# Patient Record
Sex: MALE | Race: BLACK OR AFRICAN AMERICAN | NOT HISPANIC OR LATINO | URBAN - METROPOLITAN AREA
[De-identification: names, ages, dates, MRNs, and addresses within clinical notes are randomized per-mention and may not be internally consistent; named-entity substitution may affect disease eponyms.]

---

## 2017-11-16 ENCOUNTER — OUTPATIENT (OUTPATIENT)
Dept: OUTPATIENT SERVICES | Facility: HOSPITAL | Age: 57
LOS: 1 days | Discharge: HOME | End: 2017-11-16

## 2017-11-16 DIAGNOSIS — Z76.1 ENCOUNTER FOR HEALTH SUPERVISION AND CARE OF FOUNDLING: ICD-10-CM

## 2017-11-16 DIAGNOSIS — M81.0 AGE-RELATED OSTEOPOROSIS WITHOUT CURRENT PATHOLOGICAL FRACTURE: ICD-10-CM

## 2017-11-16 DIAGNOSIS — E78.00 PURE HYPERCHOLESTEROLEMIA, UNSPECIFIED: ICD-10-CM

## 2017-11-16 DIAGNOSIS — I10 ESSENTIAL (PRIMARY) HYPERTENSION: ICD-10-CM

## 2019-08-14 ENCOUNTER — APPOINTMENT (OUTPATIENT)
Dept: NEUROSURGERY | Facility: CLINIC | Age: 59
End: 2019-08-14

## 2020-04-26 ENCOUNTER — INPATIENT (INPATIENT)
Facility: HOSPITAL | Age: 60
LOS: 1 days | Discharge: HOME | End: 2020-04-28
Attending: INTERNAL MEDICINE | Admitting: INTERNAL MEDICINE
Payer: MEDICARE

## 2020-04-26 VITALS
WEIGHT: 300.05 LBS | HEART RATE: 102 BPM | OXYGEN SATURATION: 92 % | DIASTOLIC BLOOD PRESSURE: 78 MMHG | HEIGHT: 74 IN | SYSTOLIC BLOOD PRESSURE: 158 MMHG | RESPIRATION RATE: 20 BRPM | TEMPERATURE: 103 F

## 2020-04-26 LAB
ALBUMIN SERPL ELPH-MCNC: 4.2 G/DL — SIGNIFICANT CHANGE UP (ref 3.5–5.2)
ALP SERPL-CCNC: 70 U/L — SIGNIFICANT CHANGE UP (ref 30–115)
ALT FLD-CCNC: 112 U/L — HIGH (ref 0–41)
ANION GAP SERPL CALC-SCNC: 14 MMOL/L — SIGNIFICANT CHANGE UP (ref 7–14)
APTT BLD: 27.4 SEC — SIGNIFICANT CHANGE UP (ref 27–39.2)
AST SERPL-CCNC: 98 U/L — HIGH (ref 0–41)
BASOPHILS # BLD AUTO: 0.01 K/UL — SIGNIFICANT CHANGE UP (ref 0–0.2)
BASOPHILS NFR BLD AUTO: 0.2 % — SIGNIFICANT CHANGE UP (ref 0–1)
BILIRUB DIRECT SERPL-MCNC: <0.2 MG/DL — SIGNIFICANT CHANGE UP (ref 0–0.2)
BILIRUB INDIRECT FLD-MCNC: >0.2 MG/DL — SIGNIFICANT CHANGE UP (ref 0.2–1.2)
BILIRUB SERPL-MCNC: 0.4 MG/DL — SIGNIFICANT CHANGE UP (ref 0.2–1.2)
BUN SERPL-MCNC: 9 MG/DL — LOW (ref 10–20)
CALCIUM SERPL-MCNC: 8.8 MG/DL — SIGNIFICANT CHANGE UP (ref 8.5–10.1)
CHLORIDE SERPL-SCNC: 92 MMOL/L — LOW (ref 98–110)
CO2 SERPL-SCNC: 28 MMOL/L — SIGNIFICANT CHANGE UP (ref 17–32)
CREAT SERPL-MCNC: 1.1 MG/DL — SIGNIFICANT CHANGE UP (ref 0.7–1.5)
D DIMER BLD IA.RAPID-MCNC: 202 NG/ML DDU — SIGNIFICANT CHANGE UP (ref 0–230)
EOSINOPHIL # BLD AUTO: 0 K/UL — SIGNIFICANT CHANGE UP (ref 0–0.7)
EOSINOPHIL NFR BLD AUTO: 0 % — SIGNIFICANT CHANGE UP (ref 0–8)
GLUCOSE BLDC GLUCOMTR-MCNC: 156 MG/DL — HIGH (ref 70–99)
GLUCOSE BLDC GLUCOMTR-MCNC: 161 MG/DL — HIGH (ref 70–99)
GLUCOSE BLDC GLUCOMTR-MCNC: 180 MG/DL — HIGH (ref 70–99)
GLUCOSE BLDC GLUCOMTR-MCNC: 235 MG/DL — HIGH (ref 70–99)
GLUCOSE SERPL-MCNC: 235 MG/DL — HIGH (ref 70–99)
HCT VFR BLD CALC: 37.6 % — LOW (ref 42–52)
HGB BLD-MCNC: 12.5 G/DL — LOW (ref 14–18)
IMM GRANULOCYTES NFR BLD AUTO: 0.5 % — HIGH (ref 0.1–0.3)
INR BLD: 1.17 RATIO — SIGNIFICANT CHANGE UP (ref 0.65–1.3)
LACTATE SERPL-SCNC: 1.7 MMOL/L — SIGNIFICANT CHANGE UP (ref 0.7–2)
LIDOCAIN IGE QN: 56 U/L — SIGNIFICANT CHANGE UP (ref 7–60)
LYMPHOCYTES # BLD AUTO: 1.16 K/UL — LOW (ref 1.2–3.4)
LYMPHOCYTES # BLD AUTO: 19.3 % — LOW (ref 20.5–51.1)
MCHC RBC-ENTMCNC: 29.2 PG — SIGNIFICANT CHANGE UP (ref 27–31)
MCHC RBC-ENTMCNC: 33.2 G/DL — SIGNIFICANT CHANGE UP (ref 32–37)
MCV RBC AUTO: 87.9 FL — SIGNIFICANT CHANGE UP (ref 80–94)
MONOCYTES # BLD AUTO: 0.65 K/UL — HIGH (ref 0.1–0.6)
MONOCYTES NFR BLD AUTO: 10.8 % — HIGH (ref 1.7–9.3)
NEUTROPHILS # BLD AUTO: 4.16 K/UL — SIGNIFICANT CHANGE UP (ref 1.4–6.5)
NEUTROPHILS NFR BLD AUTO: 69.2 % — SIGNIFICANT CHANGE UP (ref 42.2–75.2)
NRBC # BLD: 0 /100 WBCS — SIGNIFICANT CHANGE UP (ref 0–0)
NT-PROBNP SERPL-SCNC: <5 PG/ML — SIGNIFICANT CHANGE UP (ref 0–300)
PLATELET # BLD AUTO: 208 K/UL — SIGNIFICANT CHANGE UP (ref 130–400)
POTASSIUM SERPL-MCNC: 3.6 MMOL/L — SIGNIFICANT CHANGE UP (ref 3.5–5)
POTASSIUM SERPL-SCNC: 3.6 MMOL/L — SIGNIFICANT CHANGE UP (ref 3.5–5)
PROT SERPL-MCNC: 7.3 G/DL — SIGNIFICANT CHANGE UP (ref 6–8)
PROTHROM AB SERPL-ACNC: 13.5 SEC — HIGH (ref 9.95–12.87)
RBC # BLD: 4.28 M/UL — LOW (ref 4.7–6.1)
RBC # FLD: 12.1 % — SIGNIFICANT CHANGE UP (ref 11.5–14.5)
SARS-COV-2 RNA SPEC QL NAA+PROBE: DETECTED
SODIUM SERPL-SCNC: 134 MMOL/L — LOW (ref 135–146)
TROPONIN T SERPL-MCNC: <0.01 NG/ML — SIGNIFICANT CHANGE UP
WBC # BLD: 6.01 K/UL — SIGNIFICANT CHANGE UP (ref 4.8–10.8)
WBC # FLD AUTO: 6.01 K/UL — SIGNIFICANT CHANGE UP (ref 4.8–10.8)

## 2020-04-26 PROCEDURE — 99285 EMERGENCY DEPT VISIT HI MDM: CPT | Mod: 25

## 2020-04-26 PROCEDURE — 36000 PLACE NEEDLE IN VEIN: CPT

## 2020-04-26 PROCEDURE — 99497 ADVNCD CARE PLAN 30 MIN: CPT | Mod: 25

## 2020-04-26 PROCEDURE — 93010 ELECTROCARDIOGRAM REPORT: CPT

## 2020-04-26 PROCEDURE — 71045 X-RAY EXAM CHEST 1 VIEW: CPT | Mod: 26

## 2020-04-26 PROCEDURE — 99223 1ST HOSP IP/OBS HIGH 75: CPT

## 2020-04-26 RX ORDER — HYDROXYCHLOROQUINE SULFATE 200 MG
800 TABLET ORAL EVERY 24 HOURS
Refills: 0 | Status: COMPLETED | OUTPATIENT
Start: 2020-04-26 | End: 2020-04-26

## 2020-04-26 RX ORDER — HYDROXYCHLOROQUINE SULFATE 200 MG
400 TABLET ORAL EVERY 24 HOURS
Refills: 0 | Status: DISCONTINUED | OUTPATIENT
Start: 2020-04-27 | End: 2020-04-28

## 2020-04-26 RX ORDER — ENOXAPARIN SODIUM 100 MG/ML
40 INJECTION SUBCUTANEOUS DAILY
Refills: 0 | Status: DISCONTINUED | OUTPATIENT
Start: 2020-04-26 | End: 2020-04-28

## 2020-04-26 RX ORDER — HYDROXYCHLOROQUINE SULFATE 200 MG
TABLET ORAL
Refills: 0 | Status: DISCONTINUED | OUTPATIENT
Start: 2020-04-26 | End: 2020-04-28

## 2020-04-26 RX ORDER — ACETAMINOPHEN 500 MG
975 TABLET ORAL ONCE
Refills: 0 | Status: COMPLETED | OUTPATIENT
Start: 2020-04-26 | End: 2020-04-26

## 2020-04-26 RX ORDER — ACETAMINOPHEN 500 MG
650 TABLET ORAL EVERY 6 HOURS
Refills: 0 | Status: DISCONTINUED | OUTPATIENT
Start: 2020-04-26 | End: 2020-04-28

## 2020-04-26 RX ORDER — NIFEDIPINE 30 MG
90 TABLET, EXTENDED RELEASE 24 HR ORAL DAILY
Refills: 0 | Status: DISCONTINUED | OUTPATIENT
Start: 2020-04-26 | End: 2020-04-28

## 2020-04-26 RX ORDER — CHLORHEXIDINE GLUCONATE 213 G/1000ML
1 SOLUTION TOPICAL
Refills: 0 | Status: DISCONTINUED | OUTPATIENT
Start: 2020-04-26 | End: 2020-04-28

## 2020-04-26 RX ORDER — POTASSIUM CHLORIDE 20 MEQ
20 PACKET (EA) ORAL ONCE
Refills: 0 | Status: COMPLETED | OUTPATIENT
Start: 2020-04-26 | End: 2020-04-26

## 2020-04-26 RX ADMIN — Medication 20 MILLIEQUIVALENT(S): at 12:42

## 2020-04-26 RX ADMIN — Medication 975 MILLIGRAM(S): at 03:09

## 2020-04-26 RX ADMIN — Medication 650 MILLIGRAM(S): at 10:06

## 2020-04-26 RX ADMIN — Medication 90 MILLIGRAM(S): at 10:05

## 2020-04-26 RX ADMIN — Medication 800 MILLIGRAM(S): at 10:05

## 2020-04-26 RX ADMIN — ENOXAPARIN SODIUM 40 MILLIGRAM(S): 100 INJECTION SUBCUTANEOUS at 10:06

## 2020-04-26 NOTE — ED PROVIDER NOTE - NS ED ROS FT
GEN: (+) fever, (-) chills, (-) malaise  HEENT: (-) HA, (-) sore throat  CV: (-) chest pain, (-) palpitations, (-) edema  PULM: (+) cough, (-) wheezing, (+) SOB, (-) orthopnea  GI: (-) abdominal pain,(-) Nausea, (-) Vomiting, (-) Diarrhea  NEURO: (-) weakness, (-) paresthesias, (-) syncope  : (-) dysuria, (-) frequency, (-) urgency  MS: (-) back pain, (-) joint pain, (-)myalgias, (-) swelling  SKIN: (-) rashes, (-) new lesions  HEME: (-) bleeding, (-) ecchymosis

## 2020-04-26 NOTE — H&P ADULT - NSHPPHYSICALEXAM_GEN_ALL_CORE
GENERAL: Obese M in NAD, speaks in full sentences, no signs of respiratory distress  HEAD: Atraumatic  NECK: Supple  CHEST/LUNG: Clear to auscultation bilaterally; No wheeze or crackles  HEART: S1, S2; RRR; No murmurs, rubs, or gallops  ABDOMEN: BS+; Soft, Non-tender, Non-distended  EXTREMITIES:  2+ Peripheral Pulses, No clubbing, cyanosis, or edema  PSYCH: AAOx3  NEUROLOGY: non-focal  SKIN: No rashes or lesions

## 2020-04-26 NOTE — H&P ADULT - NSHPLABSRESULTS_GEN_ALL_CORE
12.5   6.01  )-----------( 208      ( 26 Apr 2020 02:54 )             37.6       04-26    134<L>  |  92<L>  |  9<L>  ----------------------------<  235<H>  3.6   |  28  |  1.1    Ca    8.8      26 Apr 2020 02:54    TPro  7.3  /  Alb  4.2  /  TBili  0.4  /  DBili  <0.2  /  AST  98<H>  /  ALT  112<H>  /  AlkPhos  70  04-26      PT/INR - ( 26 Apr 2020 02:54 )   PT: 13.50 sec;   INR: 1.17 ratio         PTT - ( 26 Apr 2020 02:54 )  PTT:27.4 sec

## 2020-04-26 NOTE — ED PROVIDER NOTE - ATTENDING CONTRIBUTION TO CARE
Patient is c/o cough, congestion, fever, body aches, sob, generalized weakness, no trauma.   vitals noted  lungs: B/L moderate air entry, bibasal rales  abd: +BS, NT, ND, soft  A/P: Fever/URI   r/o pneumonia  labs, CXR  reevaluation
The patient is a 67y Female complaining of shortness of breath.

## 2020-04-26 NOTE — ED PROVIDER NOTE - CARE PLAN
Principal Discharge DX:	Flu-like symptoms  Secondary Diagnosis:	Shortness of breath  Secondary Diagnosis:	Hypoxia  Secondary Diagnosis:	Opacities of both lungs present on chest x-ray

## 2020-04-26 NOTE — ED PROVIDER NOTE - OBJECTIVE STATEMENT
The pt is a 59y M with PMH HTN, DM is presenting to ED with fever and cough, 8 days, mod, non productive, no associated pain or radiation. associated with worsening sob. pt denies chest pain, lightheadedness, dizziness, syncope, n/v/d, abd pain, back pain, urinary changes.

## 2020-04-26 NOTE — ED PROVIDER NOTE - PHYSICAL EXAMINATION
GEN: Alert & Oriented x 3, No acute distress. Calm, appropriate.  Head and Neck: Normocephalic, atraumatic.   Eyes: PERRL. No conjunctival injection. No scleral icterus.   RESP: Rales noted to bilateral bases. no wheezes, rhonchi. No retractions. Equal air entry.  CARDIO: regular rate and rhythm, no murmurs, rubs or gallops. Normal S1, S2.  Radial pulses 2+ bilaterally. No lower extremity edema.  ABD: Soft, Nondistended. No rebound tenderness/guarding. No pulsatile mass. No tenderness with palpation x 4 quadrants  MS: Moving all extremities.   SKIN: no rashes/lesions, no petechiae, no ecchymosis.  NEURO: CN II-XII grossly intact. Speech and cognition normal.

## 2020-04-26 NOTE — H&P ADULT - HISTORY OF PRESENT ILLNESS
58 y/o  M with a PMH of obesity, HTN and DM who presents to the hospital with complaints of fever, non-productive cough, and SOB which started on April 18th. States that multiple family members in his household are sick, his daughter is a PA in the ICU at [facility]. No recent travel. He denies headache, rhinorrhea, sore throat, chest pain, palpitations, abdominal pain, N/V/D, urinary complaints or lower extremity swelling.     In the ED, /72, . Febrile to 102.8. Sating 92% on RA. Lymph 1.16. D-dimer 202. Mild transaminitis. CXR with bilateral interstitial infiltrates. Swabbed for COVID-19. QTc 426. Admitted to medicine for further management.

## 2020-04-26 NOTE — H&P ADULT - ATTENDING COMMENTS
60 YO M with a PMH of HTN and DM who presents to the hospital with a c/o non-productive cough for the past x 8 days. Associated with fevers and SOB. Denies any runny nose, sore throat, rashes, CP, palpitations, LE swelling, N/V/D, ABD pain, and dysuria. States that multiple family members in his household are sick, his daughter is a PA in the ICU at [facility]. No recent travel. In the ED, Chest X-ray with B/L opacities. Hypoxic, placed on NC. Isolated and COVID19 swab sent.     Physical exam shows obese pt in NAD. Tachycardic (102), febrile (102.8), not hypoxic on 4L NC (92 on RA). A&Ox3. Non-focal neuro exam. Muscle strength/sensation intact. CTA B/L with no W/C/R. RRR, no M/G/R. ABD is soft and non-tender, normoactive BSs. LEs without swelling. No rashes. Labs and radiology as above. QTc 426    Fevers + Cough + SOB likely due to viral respiratory syndrome, currently septic, needs COVID19 rule out. - recent travel. - COVID19 contact. Admit to COVID19 isolation unit. COVID19 swab sent. Send CRP, procal, D-dimer, and LDH. Trend CBC, Ck, and LFTs. Send ferritin and fibrinogen. FU official Chest XR report. IV ABXs (Ceftriaxone/Azithro) and discontinue if procal and blood cultures are negative. IV Steroids. IVFs (LR). Vitamin C/Zinc. Start Guaifenesin. APAP PRN. Anti-tussives PRN. Supplemental O2 PRN. C/w statins, if LFTs are not > 75. No NSAIDs. Prone pt regularly.     Lymphopenia and transaminitis, from above. Management as above.     Diabetes mellitus with hyperglycemia. A1c. FSs. Insulin PRN.     Hx of HTN. Restart home meds. GI and DVT PPX. Inform PCP of pt's admission to hospital. Rest as per above note.    ***NOTE SIGNED BY ATTENDING PARAM DOYLE AT 4/26/20 AT 0610, PRIOR TO RESIDENT NOTE COMPLETION***

## 2020-04-26 NOTE — H&P ADULT - ASSESSMENT
60 y/o  M with a PMH of obesity, HTN and DM who presents to the hospital with complaints of fever, non-productive cough, and SOB which started on April 18th. Admitted to medicine for further management.     # Fevers/Non-productive cough/SOB - Rule out COVID-19  - States that multiple family members in his household are sick, his daughter is a PA in the ICU at [facility].   - Date of first symptom onset: April 18th  - Isolation precautions. Limit amount of healthcare professional contact.   - In the ED, /72, . Febrile to 102.8. Sating 92% on RA.   - Lymph 1.16. D-dimer 202. Mild transaminitis.   - CXR with bilateral interstitial infiltrates.    - f/u COVID19 swab (received by lab).  - f/u procalcitonin, CRP (received).    - Consider ferritin, fibrinogen, CK and LDH, depending on clinical course.    - Start on hydroxychloroquine (800 mg X 1 day, then 400 mg X 5 days). QTc 426.   - Tylenol PRN for fevers and mild pain.   - Tessalon 100mg po q8 PRN for cough.  - Trend CBC and LFTs q 48 hrs. Routine labs not ordered for 4/27  - Prone patient as tolerated    # HTN  - resume home nifedipine     # DM  - hold oral anti-glycemics  - f/u hemoglobin A1C   - fingersticks ACHS  - Start on basal/bolus and SSI insulin if fs > 180     # Obesity  - outpatient follow up    DVT ppx: lovenox  GI ppx: none  Ambulate as tolerated  Dispo: Acute, from home  FULL CODE

## 2020-04-27 LAB
A1C WITH ESTIMATED AVERAGE GLUCOSE RESULT: 10.6 % — HIGH (ref 4–5.6)
CHOLEST SERPL-MCNC: 149 MG/DL — SIGNIFICANT CHANGE UP (ref 100–200)
CRP SERPL-MCNC: 7.37 MG/DL — HIGH (ref 0–0.4)
ESTIMATED AVERAGE GLUCOSE: 258 MG/DL — HIGH (ref 68–114)
FERRITIN SERPL-MCNC: 610 NG/ML — HIGH (ref 30–400)
GLUCOSE BLDC GLUCOMTR-MCNC: 181 MG/DL — HIGH (ref 70–99)
GLUCOSE BLDC GLUCOMTR-MCNC: 234 MG/DL — HIGH (ref 70–99)
GLUCOSE BLDC GLUCOMTR-MCNC: 237 MG/DL — HIGH (ref 70–99)
GLUCOSE BLDC GLUCOMTR-MCNC: 243 MG/DL — HIGH (ref 70–99)
HCV AB S/CO SERPL IA: 0.03 COI — SIGNIFICANT CHANGE UP
HCV AB SERPL-IMP: SIGNIFICANT CHANGE UP
HDLC SERPL-MCNC: 32 MG/DL — LOW
LIPID PNL WITH DIRECT LDL SERPL: 83 MG/DL — SIGNIFICANT CHANGE UP (ref 4–129)
PROLACTIN SERPL-MCNC: 22.2 NG/ML — HIGH (ref 4.1–18.4)
TOTAL CHOLESTEROL/HDL RATIO MEASUREMENT: 4.7 RATIO — SIGNIFICANT CHANGE UP (ref 4–5.5)
TRIGL SERPL-MCNC: 90 MG/DL — SIGNIFICANT CHANGE UP (ref 10–149)

## 2020-04-27 PROCEDURE — 99233 SBSQ HOSP IP/OBS HIGH 50: CPT

## 2020-04-27 RX ORDER — DEXTROSE 50 % IN WATER 50 %
12.5 SYRINGE (ML) INTRAVENOUS ONCE
Refills: 0 | Status: DISCONTINUED | OUTPATIENT
Start: 2020-04-27 | End: 2020-04-28

## 2020-04-27 RX ORDER — GLUCAGON INJECTION, SOLUTION 0.5 MG/.1ML
1 INJECTION, SOLUTION SUBCUTANEOUS ONCE
Refills: 0 | Status: DISCONTINUED | OUTPATIENT
Start: 2020-04-27 | End: 2020-04-28

## 2020-04-27 RX ORDER — SODIUM CHLORIDE 9 MG/ML
1000 INJECTION, SOLUTION INTRAVENOUS
Refills: 0 | Status: DISCONTINUED | OUTPATIENT
Start: 2020-04-27 | End: 2020-04-28

## 2020-04-27 RX ORDER — INSULIN GLARGINE 100 [IU]/ML
10 INJECTION, SOLUTION SUBCUTANEOUS AT BEDTIME
Refills: 0 | Status: DISCONTINUED | OUTPATIENT
Start: 2020-04-27 | End: 2020-04-28

## 2020-04-27 RX ORDER — DEXTROSE 50 % IN WATER 50 %
25 SYRINGE (ML) INTRAVENOUS ONCE
Refills: 0 | Status: DISCONTINUED | OUTPATIENT
Start: 2020-04-27 | End: 2020-04-28

## 2020-04-27 RX ORDER — INSULIN LISPRO 100/ML
VIAL (ML) SUBCUTANEOUS
Refills: 0 | Status: DISCONTINUED | OUTPATIENT
Start: 2020-04-27 | End: 2020-04-28

## 2020-04-27 RX ORDER — DEXTROSE 50 % IN WATER 50 %
15 SYRINGE (ML) INTRAVENOUS ONCE
Refills: 0 | Status: DISCONTINUED | OUTPATIENT
Start: 2020-04-27 | End: 2020-04-28

## 2020-04-27 RX ORDER — INSULIN LISPRO 100/ML
4 VIAL (ML) SUBCUTANEOUS
Refills: 0 | Status: DISCONTINUED | OUTPATIENT
Start: 2020-04-27 | End: 2020-04-28

## 2020-04-27 RX ADMIN — Medication 400 MILLIGRAM(S): at 08:48

## 2020-04-27 RX ADMIN — Medication 2: at 13:04

## 2020-04-27 RX ADMIN — Medication 4 UNIT(S): at 13:05

## 2020-04-27 RX ADMIN — Medication 100 MILLIGRAM(S): at 01:01

## 2020-04-27 RX ADMIN — Medication 650 MILLIGRAM(S): at 04:41

## 2020-04-27 RX ADMIN — Medication 2: at 17:52

## 2020-04-27 RX ADMIN — INSULIN GLARGINE 10 UNIT(S): 100 INJECTION, SOLUTION SUBCUTANEOUS at 21:23

## 2020-04-27 RX ADMIN — Medication 650 MILLIGRAM(S): at 01:01

## 2020-04-27 RX ADMIN — CHLORHEXIDINE GLUCONATE 1 APPLICATION(S): 213 SOLUTION TOPICAL at 04:42

## 2020-04-27 RX ADMIN — Medication 4 UNIT(S): at 17:53

## 2020-04-27 RX ADMIN — Medication 90 MILLIGRAM(S): at 04:42

## 2020-04-27 RX ADMIN — ENOXAPARIN SODIUM 40 MILLIGRAM(S): 100 INJECTION SUBCUTANEOUS at 13:06

## 2020-04-27 NOTE — CHART NOTE - NSCHARTNOTEFT_GEN_A_CORE
reviewed case with dr liang on call for [facility] , will monitor overnight and awaiting full inflammatory markers before transferring

## 2020-04-27 NOTE — PROGRESS NOTE ADULT - SUBJECTIVE AND OBJECTIVE BOX
JADA QUIROZ 59y Male  MRN#: [id number]   CODE STATUS: FULL     SUBJECTIVE  Patient is a 59y old Male who presents with a chief complaint of Fever/non-productive cough/SOB - rule out COVID-19 (26 Apr 2020 06:00)  Currently admitted to medicine with the primary diagnosis of Flu-like symptoms    Today is hospital day 1d. Patient is sitting up in bed, on 3-4L NC. Says he feels better on oxygen. No other complaints.     OBJECTIVE  PAST MEDICAL & SURGICAL HISTORY  Obesity  DM (diabetes mellitus)  HTN (hypertension)    ALLERGIES:  No Known Allergies    MEDICATIONS:  STANDING MEDICATIONS  chlorhexidine 4% Liquid 1 Application(s) Topical <User Schedule>  enoxaparin Injectable 40 milliGRAM(s) SubCutaneous daily  hydroxychloroquine 400 milliGRAM(s) Oral every 24 hours  hydroxychloroquine   Oral   NIFEdipine XL 90 milliGRAM(s) Oral daily    PRN MEDICATIONS  acetaminophen   Tablet .. 650 milliGRAM(s) Oral every 6 hours PRN  acetaminophen   Tablet .. 650 milliGRAM(s) Oral every 6 hours PRN  benzonatate 100 milliGRAM(s) Oral every 8 hours PRN      VITAL SIGNS: Last 24 Hours  T(C): 38.1 (27 Apr 2020 04:35), Max: 38.4 (26 Apr 2020 08:24)  T(F): 100.6 (27 Apr 2020 04:35), Max: 101.1 (26 Apr 2020 08:24)  HR: 86 (27 Apr 2020 04:35) (83 - 103)  BP: 109/55 (27 Apr 2020 04:35) (109/55 - 133/78)  BP(mean): --  RR: 20 (27 Apr 2020 04:35) (18 - 20)  SpO2: 96% (27 Apr 2020 04:35) (91% - 100%)    LABS:                        12.5   6.01  )-----------( 208      ( 26 Apr 2020 02:54 )             37.6     04-26    134<L>  |  92<L>  |  9<L>  ----------------------------<  235<H>  3.6   |  28  |  1.1    Ca    8.8      26 Apr 2020 02:54    TPro  7.3  /  Alb  4.2  /  TBili  0.4  /  DBili  <0.2  /  AST  98<H>  /  ALT  112<H>  /  AlkPhos  70  04-26    PT/INR - ( 26 Apr 2020 02:54 )   PT: 13.50 sec;   INR: 1.17 ratio         PTT - ( 26 Apr 2020 02:54 )  PTT:27.4 sec    CARDIAC MARKERS ( 26 Apr 2020 02:54 )  x     / <0.01 ng/mL / x     / x     / x          RADIOLOGY:  < from: Xray Chest 1 View-PORTABLE IMMEDIATE (04.26.20 @ 04:07) >  Impression:    1. New patchy bilateral parenchymal opacities, suspicious for pneumonia in the appropriate clinical setting.    < end of copied text >      PHYSICAL EXAM:    GENERAL: NAD, obese, AAOx3  HEENT: conjunctiva and sclera clear, No JVD  PULMONARY: Clear to auscultation bilaterally, on 4L NC   CARDIOVASCULAR: Regular rate and rhythm; No murmurs, rubs, or gallops  GASTROINTESTINAL: Soft, Nontender, Nondistended; Bowel sounds present  MUSCULOSKELETAL: No edema  NEUROLOGY: non-focal  SKIN: No rashes     ADMISSION SUMMARY  Patient is a 59y old Male who presents with a chief complaint of Fever/non-productive cough/SOB - rule out COVID-19 (26 Apr 2020 06:00)  Currently admitted to medicine with the primary diagnosis of Flu-like symptoms    ASSESSMENT & PLAN  60 y/o  M with a PMH of obesity, HTN and DM who presents to the hospital with complaints of fever, non-productive cough, and SOB which started on April 18th. Admitted to medicine for further management.     #COVID-19 respiratory illness   - States that multiple family members in his household are sick, his daughter is a PA in the ICU at [facility].   - Date of first symptom onset: April 18th, tested positive on 4/26   - Isolation precautions. Limit amount of healthcare professional contact.   - In the ED, /72, . Febrile to 102.8. Sating 92% on RA.   - Lymph 1.16. D-dimer 202. Mild transaminitis.   - CXR with bilateral interstitial infiltrates.    - f/u COVID19 swab (received by lab).  - f/u procalcitonin, CRP (received).    - Consider ferritin, fibrinogen, CK and LDH, depending on clinical course.    - Start on hydroxychloroquine (800 mg X 1 day, then 400 mg X 5 days). QTc 426.   - Tylenol PRN for fevers and mild pain.   - Tessalon 100mg po q8 PRN for cough.  - Trend CBC and LFTs q 48 hrs. Routine labs not ordered for 4/27  - Prone patient as tolerated    # HTN  - resume home nifedipine     # DM  - hold oral anti-glycemics  - f/u hemoglobin A1C   - fingersticks ACHS  - Start on basal/bolus and SSI insulin if fs > 180     # Obesity  - outpatient follow up    DVT ppx: lovenox  GI ppx: none  Ambulate as tolerated  Dispo: Acute, from home  FULL CODE JADA QUIROZ 59y Male  MRN#: [id number]   CODE STATUS: FULL     SUBJECTIVE  Patient is a 59y old Male who presents with a chief complaint of Fever/non-productive cough/SOB - rule out COVID-19 (26 Apr 2020 06:00)  Currently admitted to medicine with the primary diagnosis of Flu-like symptoms    Today is hospital day 1d. Patient is sitting up in bed, on 3-4L NC. Says he feels better on oxygen. No other complaints.     OBJECTIVE  PAST MEDICAL & SURGICAL HISTORY  Obesity  DM (diabetes mellitus)  HTN (hypertension)    ALLERGIES:  No Known Allergies    MEDICATIONS:  STANDING MEDICATIONS  chlorhexidine 4% Liquid 1 Application(s) Topical <User Schedule>  enoxaparin Injectable 40 milliGRAM(s) SubCutaneous daily  hydroxychloroquine 400 milliGRAM(s) Oral every 24 hours  hydroxychloroquine   Oral   NIFEdipine XL 90 milliGRAM(s) Oral daily    PRN MEDICATIONS  acetaminophen   Tablet .. 650 milliGRAM(s) Oral every 6 hours PRN  acetaminophen   Tablet .. 650 milliGRAM(s) Oral every 6 hours PRN  benzonatate 100 milliGRAM(s) Oral every 8 hours PRN      VITAL SIGNS: Last 24 Hours  T(C): 38.1 (27 Apr 2020 04:35), Max: 38.4 (26 Apr 2020 08:24)  T(F): 100.6 (27 Apr 2020 04:35), Max: 101.1 (26 Apr 2020 08:24)  HR: 86 (27 Apr 2020 04:35) (83 - 103)  BP: 109/55 (27 Apr 2020 04:35) (109/55 - 133/78)  BP(mean): --  RR: 20 (27 Apr 2020 04:35) (18 - 20)  SpO2: 96% (27 Apr 2020 04:35) (91% - 100%)    LABS:                        12.5   6.01  )-----------( 208      ( 26 Apr 2020 02:54 )             37.6     04-26    134<L>  |  92<L>  |  9<L>  ----------------------------<  235<H>  3.6   |  28  |  1.1    Ca    8.8      26 Apr 2020 02:54    TPro  7.3  /  Alb  4.2  /  TBili  0.4  /  DBili  <0.2  /  AST  98<H>  /  ALT  112<H>  /  AlkPhos  70  04-26    PT/INR - ( 26 Apr 2020 02:54 )   PT: 13.50 sec;   INR: 1.17 ratio         PTT - ( 26 Apr 2020 02:54 )  PTT:27.4 sec    CARDIAC MARKERS ( 26 Apr 2020 02:54 )  x     / <0.01 ng/mL / x     / x     / x          RADIOLOGY:  < from: Xray Chest 1 View-PORTABLE IMMEDIATE (04.26.20 @ 04:07) >  Impression:    1. New patchy bilateral parenchymal opacities, suspicious for pneumonia in the appropriate clinical setting.    < end of copied text >      PHYSICAL EXAM:    GENERAL: NAD, obese, AAOx3  HEENT: conjunctiva and sclera clear, No JVD  PULMONARY: Clear to auscultation bilaterally, on 4L NC   CARDIOVASCULAR: Regular rate and rhythm; No murmurs, rubs, or gallops  GASTROINTESTINAL: Soft, Nontender, Nondistended; Bowel sounds present  MUSCULOSKELETAL: No edema  NEUROLOGY: non-focal  SKIN: No rashes     ADMISSION SUMMARY  Patient is a 59y old Male who presents with a chief complaint of Fever/non-productive cough/SOB - rule out COVID-19 (26 Apr 2020 06:00)  Currently admitted to medicine with the primary diagnosis of Flu-like symptoms    ASSESSMENT & PLAN  60 y/o  M with a PMH of obesity, HTN and DM who presents to the hospital with complaints of fever, non-productive cough, and SOB which started on April 18th. Admitted to medicine for further management.     #COVID-19 respiratory illness   - States that multiple family members in his household are sick, his daughter is a PA in the ICU at [facility].   - Date of first symptom onset: April 18th, tested positive on 4/26   - In the ED, /72, . Febrile to 102.8. Sating 92% on RA.   - Lymph 1.16. D-dimer 202. Mild transaminitis.   - CXR with bilateral interstitial infiltrates.    - f/u procalcitonin, CRP (received).    - Consider ferritin, fibrinogen, CK and LDH, depending on clinical course.    - Start on hydroxychloroquine (800 mg X 1 day, then 400 mg X 5 days). QTc 426.   - Tylenol PRN for fevers and mild pain.   - Tessalon 100mg po q8 PRN for cough.   - Trend CBC and LFTs q 48 hrs.   - Prone patient as tolerated    # HTN, controlled   - resume home nifedipine     # DM  - hold oral anti-glycemics  - f/u hemoglobin A1C   - started on insulin sliding scale as FS >180      # Obesity  - outpatient follow up    DVT ppx: lovenox  GI ppx: none  Ambulate as tolerated  Dispo: Acute, from home  FULL CODE JADA QUIROZ 59y Male  MRN#: [id number]   CODE STATUS: FULL     SUBJECTIVE  Patient is a 59y old Male who presents with a chief complaint of Fever/non-productive cough/SOB - rule out COVID-19 (26 Apr 2020 06:00)  Currently admitted to medicine with the primary diagnosis of Flu-like symptoms    Today is hospital day 1d. Patient is sitting up in bed, on 3-4L NC. Says he feels better on oxygen. No other complaints.     OBJECTIVE  PAST MEDICAL & SURGICAL HISTORY  Obesity  DM (diabetes mellitus)  HTN (hypertension)    ALLERGIES:  No Known Allergies    MEDICATIONS:  STANDING MEDICATIONS  chlorhexidine 4% Liquid 1 Application(s) Topical <User Schedule>  enoxaparin Injectable 40 milliGRAM(s) SubCutaneous daily  hydroxychloroquine 400 milliGRAM(s) Oral every 24 hours  hydroxychloroquine   Oral   NIFEdipine XL 90 milliGRAM(s) Oral daily    PRN MEDICATIONS  acetaminophen   Tablet .. 650 milliGRAM(s) Oral every 6 hours PRN  acetaminophen   Tablet .. 650 milliGRAM(s) Oral every 6 hours PRN  benzonatate 100 milliGRAM(s) Oral every 8 hours PRN      VITAL SIGNS: Last 24 Hours  T(C): 38.1 (27 Apr 2020 04:35), Max: 38.4 (26 Apr 2020 08:24)  T(F): 100.6 (27 Apr 2020 04:35), Max: 101.1 (26 Apr 2020 08:24)  HR: 86 (27 Apr 2020 04:35) (83 - 103)  BP: 109/55 (27 Apr 2020 04:35) (109/55 - 133/78)  BP(mean): --  RR: 20 (27 Apr 2020 04:35) (18 - 20)  SpO2: 96% (27 Apr 2020 04:35) (91% - 100%)    LABS:                        12.5   6.01  )-----------( 208      ( 26 Apr 2020 02:54 )             37.6     04-26    134<L>  |  92<L>  |  9<L>  ----------------------------<  235<H>  3.6   |  28  |  1.1    Ca    8.8      26 Apr 2020 02:54    TPro  7.3  /  Alb  4.2  /  TBili  0.4  /  DBili  <0.2  /  AST  98<H>  /  ALT  112<H>  /  AlkPhos  70  04-26    PT/INR - ( 26 Apr 2020 02:54 )   PT: 13.50 sec;   INR: 1.17 ratio         PTT - ( 26 Apr 2020 02:54 )  PTT:27.4 sec    CARDIAC MARKERS ( 26 Apr 2020 02:54 )  x     / <0.01 ng/mL / x     / x     / x          RADIOLOGY:  < from: Xray Chest 1 View-PORTABLE IMMEDIATE (04.26.20 @ 04:07) >  Impression:    1. New patchy bilateral parenchymal opacities, suspicious for pneumonia in the appropriate clinical setting.    < end of copied text >      PHYSICAL EXAM:    GENERAL: NAD, obese, AAOx3  HEENT: conjunctiva and sclera clear, No JVD  PULMONARY: Clear to auscultation bilaterally, on 4L NC   CARDIOVASCULAR: Regular rate and rhythm  GASTROINTESTINAL: Soft, Nontender, Nondistended  MUSCULOSKELETAL: No edema  NEUROLOGY: non-focal  SKIN: No rashes     ADMISSION SUMMARY  Patient is a 59y old Male who presents with a chief complaint of Fever/non-productive cough/SOB - rule out COVID-19 (26 Apr 2020 06:00)  Currently admitted to medicine with the primary diagnosis of Flu-like symptoms    ASSESSMENT & PLAN  60 y/o  M with a PMH of obesity, HTN and DM who presents to the hospital with complaints of fever, non-productive cough, and SOB which started on April 18th. Admitted to medicine for further management.     #COVID-19 respiratory illness   - States that multiple family members in his household are sick, his daughter is a PA in the ICU at [facility].   - Date of first symptom onset: April 18th, tested positive on 4/26   - In the ED, /72, . Febrile to 102.8. Sating 92% on RA.   - Lymph 1.16. D-dimer 202. Mild transaminitis.   - CXR with bilateral interstitial infiltrates.    - will try to titrate patient off supplemental oxygen   - f/u procalcitonin, CRP (received).    - Consider ferritin, fibrinogen, CK and LDH, depending on clinical course.    - Start on hydroxychloroquine (800 mg X 1 day, then 400 mg X 5 days). QTc 426.   - Tylenol PRN for fevers and mild pain.   - Tessalon 100mg po q8 PRN for cough.   - Trend CBC and LFTs q 48 hrs.   - Prone patient as tolerated    # HTN, controlled   - resume home nifedipine     # DM  - hold oral anti-glycemics  - f/u hemoglobin A1C   - started on insulin sliding scale as FS >180      # Obesity  - outpatient follow up    DVT ppx: lovenox  GI ppx: none  Ambulate as tolerated  Dispo: Acute, from home  FULL CODE

## 2020-04-27 NOTE — PROGRESS NOTE ADULT - ASSESSMENT
Patient is a 59y old  Male who presents with a chief complaint of Fever/non-productive cough/SOB - rule out COVID-19 (27 Apr 2020 07:58)    #Sepsis poa  secondary to COVID-19  questionable hypoxia since saturating low 90s on 4L, but no evidence of hypoxia (92 on RA) , titrate oxygen down today   hydroxychloroquine  crp, ferritin, procalcitonin  pending   Sepsis resolved    #Morbid obesity BMI 40 patient needs to see dieitian outpatient for further evaluation and qualifies for evaluation by bariatric surgeron     #DM  POCT Blood Glucose.: 181 mg/dL (27 Apr 2020 08:23)  POCT Blood Glucose.: 235 mg/dL (26 Apr 2020 21:10)  POCT Blood Glucose.: 180 mg/dL (26 Apr 2020 16:45)  POCT Blood Glucose.: 161 mg/dL (26 Apr 2020 12:00)  controlled    #Hyponatremia -> pseudohyponatremia on BMP , correcting for hyperglycemia the sodium is 136-137 dependant on bautista versus Katiana equations respectively     #CKD 2    #Anemia no indication for transfusion     #HTN Vital Signs Last 24 Hrs  BP: 101/68 (27 Apr 2020 08:36) (101/68 - 133/75)  controlled    #FULL CODE    Progress Note Handoff    Pending:  CRP, Procal , Ferritin pending , titration of oxygen    Family discussion: patient verbalized understanding and agreeable to plan of care , all questions answer     Disposition: Home___

## 2020-04-27 NOTE — CHART NOTE - NSCHARTNOTEFT_GEN_A_CORE
I made rounds today with the treatment team including the hospitalist, residents,  nurses and discussed the patient's current medical status and discharge  planning needs, and reviewed the chart.    T(C): 36.7 (04-27-20 @ 12:40), Max: 38.2 (04-27-20 @ 00:00)  HR: 92 (04-27-20 @ 12:40) (83 - 103)  BP: 105/56 (04-27-20 @ 12:40) (101/68 - 133/75)  RR: 18 (04-27-20 @ 12:40) (18 - 20)  SpO2: 91% (04-27-20 @ 12:40) (91% - 100%)          I reached out to the patient's health care proxy/ responsible family member-           [     ]  I reached                                     and discussed the patient's medical condition,                   family concerns, and discharge planning           [     ]  I left a message with family               [   X  ]  I personally participated in rounds with the medical team and my resident and discussed the case. My resident reached                   family member/ HCP     daughter, Rashida, an NP          under my direction and supervision  and we reviewed the conversation.          [     ]  My resident left a message with family under my direction and supervision    The following was discussed:     medical condition reviewed. Patient is on 3-4 liters NC O2 with O2 sat 93%. She has a low grade fever and is getting Plaquenil. LFTs are increased.          [     ] I spent 5-10 minutes on the above discussing medical issues with team members and family and/ or my resident    [  X   ] I spent 11-20 minutes on the above discussing medical issues with team members and family and/ or my resident    [     ] I spent 21-30 minutes on the above discussing medical issues with team members and family and/ or my resident

## 2020-04-27 NOTE — PROGRESS NOTE ADULT - SUBJECTIVE AND OBJECTIVE BOX
JADA QUIROZ  59y  [id number]      Patient is a 59y old  Male who presents with a chief complaint of Fever/non-productive cough/SOB - rule out COVID-19 (27 Apr 2020 07:58)      INTERVAL HPI/OVERNIGHT EVENTS:    no acute events overnight     REVIEW OF SYSTEMS:  CONSTITUTIONAL: No fever, weight loss, or fatigue  EYES: No eye pain, visual disturbances, or discharge  ENMT:  No difficulty hearing, tinnitus, vertigo; No sinus or throat pain  NECK: No pain or stiffness  BREASTS: No pain, masses, or nipple discharge  RESPIRATORY: No cough, wheezing, chills or hemoptysis; No shortness of breath  CARDIOVASCULAR: No chest pain, palpitations, dizziness, or leg swelling  GASTROINTESTINAL: No abdominal or epigastric pain. No nausea, vomiting, or hematemesis; No diarrhea or constipation. No melena or hematochezia.  GENITOURINARY: No dysuria, frequency, hematuria, or incontinence  NEUROLOGICAL: No headaches, memory loss, loss of strength, numbness, or tremors  SKIN: No itching, burning, rashes, or lesions   LYMPH NODES: No enlarged glands  ENDOCRINE: No heat or cold intolerance; No hair loss  MUSCULOSKELETAL: No joint pain or swelling; No muscle, back, or extremity pain  PSYCHIATRIC: No depression, anxiety, mood swings, or difficulty sleeping  HEME/LYMPH: No easy bruising, or bleeding gums  ALLERY AND IMMUNOLOGIC: No hives or eczema  FAMILY HISTORY:    T(C): 36.8 (04-27-20 @ 08:36), Max: 38.2 (04-27-20 @ 00:00)  HR: 92 (04-27-20 @ 08:36) (83 - 103)  BP: 101/68 (04-27-20 @ 08:36) (101/68 - 133/75)  RR: 20 (04-27-20 @ 08:36) (18 - 20)  SpO2: 93% (04-27-20 @ 08:36) (91% - 100%)  Wt(kg): --Vital Signs Last 24 Hrs  T(C): 36.8 (27 Apr 2020 08:36), Max: 38.2 (27 Apr 2020 00:00)  T(F): 98.3 (27 Apr 2020 08:36), Max: 100.7 (27 Apr 2020 00:00)  HR: 92 (27 Apr 2020 08:36) (83 - 103)  BP: 101/68 (27 Apr 2020 08:36) (101/68 - 133/75)  BP(mean): --  RR: 20 (27 Apr 2020 08:36) (18 - 20)  SpO2: 93% (27 Apr 2020 08:36) (91% - 100%)    PHYSICAL EXAM:  GEN Lying in no acute distress  HEENT Pupils equal and reactive to light and accommodationSupple Neck  PULM Clear to auscultation bilaterally  CV s1s2 regular rate and rhythm  GI + bowel sounds nontnender obese  EXT no cyanosis or edema  PSYCH awake alert and oriented x 3  INTEG No Lesions  NEURO SOLIZ                              12.5   6.01  )-----------( 208      ( 26 Apr 2020 02:54 )             37.6   04-26    134<L>  |  92<L>  |  9<L>  ----------------------------<  235<H>  3.6   |  28  |  1.1    Ca    8.8      26 Apr 2020 02:54    TPro  7.3  /  Alb  4.2  /  TBili  0.4  /  DBili  <0.2  /  AST  98<H>  /  ALT  112<H>  /  AlkPhos  70  04-26            acetaminophen   Tablet .. 650 milliGRAM(s) Oral every 6 hours PRN  acetaminophen   Tablet .. 650 milliGRAM(s) Oral every 6 hours PRN  benzonatate 100 milliGRAM(s) Oral every 8 hours PRN  chlorhexidine 4% Liquid 1 Application(s) Topical <User Schedule>  dextrose 40% Gel 15 Gram(s) Oral once PRN  dextrose 5%. 1000 milliLiter(s) IV Continuous <Continuous>  dextrose 50% Injectable 12.5 Gram(s) IV Push once  dextrose 50% Injectable 25 Gram(s) IV Push once  dextrose 50% Injectable 25 Gram(s) IV Push once  enoxaparin Injectable 40 milliGRAM(s) SubCutaneous daily  glucagon  Injectable 1 milliGRAM(s) IntraMuscular once PRN  hydroxychloroquine 400 milliGRAM(s) Oral every 24 hours  hydroxychloroquine   Oral   insulin glargine Injectable (LANTUS) 10 Unit(s) SubCutaneous at bedtime  insulin lispro (HumaLOG) corrective regimen sliding scale   SubCutaneous three times a day before meals  insulin lispro Injectable (HumaLOG) 4 Unit(s) SubCutaneous three times a day before meals  NIFEdipine XL 90 milliGRAM(s) Oral daily      HEALTH ISSUES - PROBLEM Dx:          Case Discussed with House Staff   Spectra [phone]

## 2020-04-28 ENCOUNTER — TRANSCRIPTION ENCOUNTER (OUTPATIENT)
Age: 60
End: 2020-04-28

## 2020-04-28 VITALS
SYSTOLIC BLOOD PRESSURE: 139 MMHG | TEMPERATURE: 99 F | RESPIRATION RATE: 21 BRPM | OXYGEN SATURATION: 96 % | DIASTOLIC BLOOD PRESSURE: 81 MMHG

## 2020-04-28 LAB
A1C WITH ESTIMATED AVERAGE GLUCOSE RESULT: 10.6 % — HIGH (ref 4–5.6)
ALBUMIN SERPL ELPH-MCNC: 4.2 G/DL — SIGNIFICANT CHANGE UP (ref 3.5–5.2)
ALP SERPL-CCNC: 72 U/L — SIGNIFICANT CHANGE UP (ref 30–115)
ALT FLD-CCNC: 84 U/L — HIGH (ref 0–41)
ANION GAP SERPL CALC-SCNC: 17 MMOL/L — HIGH (ref 7–14)
AST SERPL-CCNC: 66 U/L — HIGH (ref 0–41)
BILIRUB SERPL-MCNC: 0.4 MG/DL — SIGNIFICANT CHANGE UP (ref 0.2–1.2)
BUN SERPL-MCNC: 11 MG/DL — SIGNIFICANT CHANGE UP (ref 10–20)
CALCIUM SERPL-MCNC: 9.2 MG/DL — SIGNIFICANT CHANGE UP (ref 8.5–10.1)
CHLORIDE SERPL-SCNC: 95 MMOL/L — LOW (ref 98–110)
CO2 SERPL-SCNC: 27 MMOL/L — SIGNIFICANT CHANGE UP (ref 17–32)
CREAT SERPL-MCNC: 1 MG/DL — SIGNIFICANT CHANGE UP (ref 0.7–1.5)
ESTIMATED AVERAGE GLUCOSE: 258 MG/DL — HIGH (ref 68–114)
GLUCOSE BLDC GLUCOMTR-MCNC: 166 MG/DL — HIGH (ref 70–99)
GLUCOSE BLDC GLUCOMTR-MCNC: 197 MG/DL — HIGH (ref 70–99)
GLUCOSE SERPL-MCNC: 192 MG/DL — HIGH (ref 70–99)
HCT VFR BLD CALC: 38.4 % — LOW (ref 42–52)
HGB BLD-MCNC: 13.3 G/DL — LOW (ref 14–18)
MCHC RBC-ENTMCNC: 31.1 PG — HIGH (ref 27–31)
MCHC RBC-ENTMCNC: 34.6 G/DL — SIGNIFICANT CHANGE UP (ref 32–37)
MCV RBC AUTO: 89.7 FL — SIGNIFICANT CHANGE UP (ref 80–94)
NRBC # BLD: 0 /100 WBCS — SIGNIFICANT CHANGE UP (ref 0–0)
PLATELET # BLD AUTO: 267 K/UL — SIGNIFICANT CHANGE UP (ref 130–400)
POTASSIUM SERPL-MCNC: 3.6 MMOL/L — SIGNIFICANT CHANGE UP (ref 3.5–5)
POTASSIUM SERPL-SCNC: 3.6 MMOL/L — SIGNIFICANT CHANGE UP (ref 3.5–5)
PROCALCITONIN SERPL-MCNC: 0.11 NG/ML — HIGH (ref 0.02–0.1)
PROT SERPL-MCNC: 7.4 G/DL — SIGNIFICANT CHANGE UP (ref 6–8)
RBC # BLD: 4.28 M/UL — LOW (ref 4.7–6.1)
RBC # FLD: 12.2 % — SIGNIFICANT CHANGE UP (ref 11.5–14.5)
SODIUM SERPL-SCNC: 139 MMOL/L — SIGNIFICANT CHANGE UP (ref 135–146)
WBC # BLD: 6.59 K/UL — SIGNIFICANT CHANGE UP (ref 4.8–10.8)
WBC # FLD AUTO: 6.59 K/UL — SIGNIFICANT CHANGE UP (ref 4.8–10.8)

## 2020-04-28 PROCEDURE — 99239 HOSP IP/OBS DSCHRG MGMT >30: CPT

## 2020-04-28 RX ORDER — HYDROXYCHLOROQUINE SULFATE 200 MG
2 TABLET ORAL
Qty: 4 | Refills: 0
Start: 2020-04-28 | End: 2020-04-29

## 2020-04-28 RX ORDER — INSULIN LISPRO 100/ML
6 VIAL (ML) SUBCUTANEOUS
Refills: 0 | Status: DISCONTINUED | OUTPATIENT
Start: 2020-04-28 | End: 2020-04-28

## 2020-04-28 RX ADMIN — Medication 400 MILLIGRAM(S): at 08:18

## 2020-04-28 RX ADMIN — Medication 1: at 08:15

## 2020-04-28 RX ADMIN — Medication 4 UNIT(S): at 08:16

## 2020-04-28 RX ADMIN — Medication 90 MILLIGRAM(S): at 05:21

## 2020-04-28 NOTE — PROGRESS NOTE ADULT - ATTENDING COMMENTS
patient seen and examined , agree with pgy 3 assesment and plan except as indicated above,  GEN Lying in no acute distress  HEENT Pupils equal and reactive to light and accommodationSupple Neck  PULM Clear to auscultation bilaterally  CV s1s2 regular rate and rhythm  GI + bowel sounds nontnender obese   EXT no cyanosis or edema  PSYCH awake alert and oriented x 3  INTEG No Lesions  NEURO SOLIZ  reviewed inflammatory markers, room air 97 percent , ambulating without difficulty  DC HOME

## 2020-04-28 NOTE — CHART NOTE - NSCHARTNOTEFT_GEN_A_CORE
I made rounds today with the treatment team including the hospitalist, residents,  nurses and discussed the patient's current medical status and discharge  planning needs, and reviewed the chart.    T(C): 36.7 (04-28-20 @ 08:10), Max: 37.2 (04-28-20 @ 00:00)  HR: 90 (04-28-20 @ 08:10) (90 - 110)  BP: 137/71 (04-28-20 @ 08:10) (128/60 - 147/84)  RR: 20 (04-28-20 @ 08:10) (15 - 21)  SpO2: 97% (04-28-20 @ 10:16) (93% - 99%)          I reached out to the patient's health care proxy/ responsible family member-           [     ]  I reached                                     and discussed the patient's medical condition,                   family concerns, and discharge planning           [     ]  I left a message with family               [  X   ]  I personally participated in rounds with the medical team and my resident and discussed the case. My resident reached                   family member/ HCP     daughter, Rashida              under my direction and supervision  and we reviewed the conversation.          [     ]  My resident left a message with family under my direction and supervision      The following was discussed:     Medical condition reviewed. Pulse ox is 97% on room air. He is afebrile over 24 hrs.  He does not want to go to [facility] and would prefer to go directly home. His daughter is questioning if he should be on oral antibiotics. His procal is 0.11. ( she is a nurse)            [     ] I spent 5-10 minutes on the above discussing medical issues with team members and family and/ or my resident    [  X   ] I spent 11-20 minutes on the above discussing medical issues with team members and family and/ or my resident    [     ] I spent 21-30 minutes on the above discussing medical issues with team members and family and/ or my resident

## 2020-04-28 NOTE — DISCHARGE NOTE PROVIDER - NSDCMRMEDTOKEN_GEN_ALL_CORE_FT
Janumet 50 mg-500 mg oral tablet: 1 tab(s) orally 2 times a day  NIFEdipine 90 mg oral tablet, extended release: 1 tab(s) orally once a day

## 2020-04-28 NOTE — DISCHARGE NOTE PROVIDER - HOSPITAL COURSE
60 y/o  M with a PMH of obesity, HTN and DM who presents to the hospital with complaints of fever, non-productive cough, and SOB which started on April 18th. Admitted to medicine for further management.  Tested positive for COVID on 4/26. 60 y/o  M with a PMH of obesity, HTN and DM who presents to the hospital with complaints of fever, non-productive cough, and SOB which started on April 18th. Admitted to medicine for further management.  Tested positive for COVID on 4/26. Started on plaquenil, required supplemental oxygen for one day but was able to titrate off of it and saturate well on room air. Stable for discharge home.

## 2020-04-28 NOTE — PROGRESS NOTE ADULT - SUBJECTIVE AND OBJECTIVE BOX
JADA QUIROZ 59y Male  MRN#: [id number]   CODE STATUS: FULL     SUBJECTIVE  Patient is a 59y old Male who presents with a chief complaint of Fever/non-productive cough/SOB - rule out COVID-19 (27 Apr 2020 10:14)  Currently admitted to medicine with the primary diagnosis of Flu-like symptoms    Today is hospital day 2d. No acute events overnight.     OBJECTIVE  PAST MEDICAL & SURGICAL HISTORY  Obesity  DM (diabetes mellitus)  HTN (hypertension)    ALLERGIES:  No Known Allergies    MEDICATIONS:  STANDING MEDICATIONS  chlorhexidine 4% Liquid 1 Application(s) Topical <User Schedule>  dextrose 5%. 1000 milliLiter(s) IV Continuous <Continuous>  dextrose 50% Injectable 12.5 Gram(s) IV Push once  dextrose 50% Injectable 25 Gram(s) IV Push once  dextrose 50% Injectable 25 Gram(s) IV Push once  enoxaparin Injectable 40 milliGRAM(s) SubCutaneous daily  hydroxychloroquine 400 milliGRAM(s) Oral every 24 hours  hydroxychloroquine   Oral   insulin glargine Injectable (LANTUS) 10 Unit(s) SubCutaneous at bedtime  insulin lispro (HumaLOG) corrective regimen sliding scale   SubCutaneous three times a day before meals  insulin lispro Injectable (HumaLOG) 4 Unit(s) SubCutaneous three times a day before meals  NIFEdipine XL 90 milliGRAM(s) Oral daily    PRN MEDICATIONS  acetaminophen   Tablet .. 650 milliGRAM(s) Oral every 6 hours PRN  acetaminophen   Tablet .. 650 milliGRAM(s) Oral every 6 hours PRN  benzonatate 100 milliGRAM(s) Oral every 8 hours PRN  dextrose 40% Gel 15 Gram(s) Oral once PRN  glucagon  Injectable 1 milliGRAM(s) IntraMuscular once PRN      VITAL SIGNS: Last 24 Hours  T(C): 37.2 (28 Apr 2020 04:00), Max: 37.2 (28 Apr 2020 00:00)  T(F): 98.9 (28 Apr 2020 04:00), Max: 99 (28 Apr 2020 00:00)  HR: 106 (28 Apr 2020 04:00) (92 - 110)  BP: 143/81 (28 Apr 2020 04:00) (101/68 - 147/84)  BP(mean): --  RR: 19 (28 Apr 2020 04:00) (15 - 21)  SpO2: 99% (28 Apr 2020 04:00) (91% - 99%)    LABS:                        13.3   6.59  )-----------( 267      ( 28 Apr 2020 04:30 )             38.4     RADIOLOGY:  none today     PHYSICAL EXAM:  GENERAL: NAD, obese, AAOx3  HEENT: conjunctiva and sclera clear, No JVD  PULMONARY: Clear to auscultation bilaterally, on 3L NC   CARDIOVASCULAR: Regular rate and rhythm  GASTROINTESTINAL: Soft, Nontender, Nondistended  MUSCULOSKELETAL: No edema  NEUROLOGY: non-focal  SKIN: No rashes    ADMISSION SUMMARY  Patient is a 59y old Male who presents with a chief complaint of Fever/non-productive cough/SOB - rule out COVID-19 (27 Apr 2020 10:14)  Currently admitted to medicine with the primary diagnosis of Flu-like symptoms    ASSESSMENT & PLAN  60 y/o  M with a PMH of obesity, HTN and DM who presents to the hospital with complaints of fever, non-productive cough, and SOB which started on April 18th. Admitted to medicine for further management.     #COVID-19 respiratory illness requiring supplemental oxygen   - States that multiple family members in his household are sick, his daughter is a PA in the ICU at [facility].   - Date of first symptom onset: April 18th, tested positive on 4/26   - In the ED, /72, . Febrile to 102.8. Sating 92% on RA.   - Lymph 1.16. D-dimer 202. Mild transaminitis.   - CXR with bilateral interstitial infiltrates.    - will try to titrate patient off supplemental oxygen   - procalcitonin- 0.11, CRP 7.37   - continue hydroxychloroquine (800 mg X 1 day, then 400 mg X 5 days). QTc 426.   - Tylenol PRN for fevers and mild pain.   - Tessalon 100mg po q8 PRN for cough.   - Trend CBC and LFTs q 48 hrs.   - Prone patient as tolerated    # HTN, controlled   - resume home nifedipine     # DM  - hold oral anti-glycemics  - hemoglobin A1C 10.6   - started on insulin sliding scale as FS >180      # Obesity  - outpatient follow up    DVT ppx: lovenox  GI ppx: none  Ambulate as tolerated  Dispo: Acute, from home  FULL CODE JADA QUIROZ 59y Male  MRN#: [id number]   CODE STATUS: FULL     SUBJECTIVE  Patient is a 59y old Male who presents with a chief complaint of Fever/non-productive cough/SOB - rule out COVID-19 (27 Apr 2020 10:14)  Currently admitted to medicine with the primary diagnosis of Flu-like symptoms    Today is hospital day 2d. No acute events overnight.     OBJECTIVE  PAST MEDICAL & SURGICAL HISTORY  Obesity  DM (diabetes mellitus)  HTN (hypertension)    ALLERGIES:  No Known Allergies    MEDICATIONS:  STANDING MEDICATIONS  chlorhexidine 4% Liquid 1 Application(s) Topical <User Schedule>  dextrose 5%. 1000 milliLiter(s) IV Continuous <Continuous>  dextrose 50% Injectable 12.5 Gram(s) IV Push once  dextrose 50% Injectable 25 Gram(s) IV Push once  dextrose 50% Injectable 25 Gram(s) IV Push once  enoxaparin Injectable 40 milliGRAM(s) SubCutaneous daily  hydroxychloroquine 400 milliGRAM(s) Oral every 24 hours  hydroxychloroquine   Oral   insulin glargine Injectable (LANTUS) 10 Unit(s) SubCutaneous at bedtime  insulin lispro (HumaLOG) corrective regimen sliding scale   SubCutaneous three times a day before meals  insulin lispro Injectable (HumaLOG) 4 Unit(s) SubCutaneous three times a day before meals  NIFEdipine XL 90 milliGRAM(s) Oral daily    PRN MEDICATIONS  acetaminophen   Tablet .. 650 milliGRAM(s) Oral every 6 hours PRN  acetaminophen   Tablet .. 650 milliGRAM(s) Oral every 6 hours PRN  benzonatate 100 milliGRAM(s) Oral every 8 hours PRN  dextrose 40% Gel 15 Gram(s) Oral once PRN  glucagon  Injectable 1 milliGRAM(s) IntraMuscular once PRN      VITAL SIGNS: Last 24 Hours  T(C): 37.2 (28 Apr 2020 04:00), Max: 37.2 (28 Apr 2020 00:00)  T(F): 98.9 (28 Apr 2020 04:00), Max: 99 (28 Apr 2020 00:00)  HR: 106 (28 Apr 2020 04:00) (92 - 110)  BP: 143/81 (28 Apr 2020 04:00) (101/68 - 147/84)  BP(mean): --  RR: 19 (28 Apr 2020 04:00) (15 - 21)  SpO2: 99% (28 Apr 2020 04:00) (91% - 99%)    LABS:                        13.3   6.59  )-----------( 267      ( 28 Apr 2020 04:30 )             38.4     RADIOLOGY:  none today     PHYSICAL EXAM:  GENERAL: NAD, obese, AAOx3  HEENT: conjunctiva and sclera clear, No JVD  PULMONARY: Clear to auscultation bilaterally, on 3L NC   CARDIOVASCULAR: Regular rate and rhythm  GASTROINTESTINAL: Soft, Nontender, Nondistended  MUSCULOSKELETAL: No edema  NEUROLOGY: non-focal  SKIN: No rashes    ADMISSION SUMMARY  Patient is a 59y old Male who presents with a chief complaint of Fever/non-productive cough/SOB - rule out COVID-19 (27 Apr 2020 10:14)  Currently admitted to medicine with the primary diagnosis of Flu-like symptoms    ASSESSMENT & PLAN  58 y/o  M with a PMH of obesity, HTN and DM who presents to the hospital with complaints of fever, non-productive cough, and SOB which started on April 18th. Admitted to medicine for further management.     #COVID-19 respiratory illness requiring supplemental oxygen   - States that multiple family members in his household are sick, his daughter is a PA in the ICU at [facility].   - Date of first symptom onset: April 18th, tested positive on 4/26   - In the ED, /72, . Febrile to 102.8. Sating 92% on RA.   - Last fever: 100.6 4/27 AM - afebrile for 24 hours   - Lymph 1.16. D-dimer 202. Mild transaminitis.   - procalcitonin- 0.11, CRP 7.37   - CXR with bilateral interstitial infiltrates.    - will try to titrate patient off supplemental oxygen   - continue hydroxychloroquine. (End date: 4/30). QTc 426.   - Tylenol PRN for fevers and mild pain.   - Tessalon 100mg po q8 PRN for cough.   - Trend CBC and LFTs q 48 hrs.   - Prone patient as tolerated    #Mild transaminitis, resolving     # HTN, controlled   - resume home nifedipine     # DM  - hold oral anti-glycemics  - hemoglobin A1C 10.6   - started on insulin sliding scale as FS >180    - will increase lispro as pre-mealtime sugars elevated    # Obesity  - outpatient follow up    DVT ppx: lovenox  GI ppx: none  Ambulate as tolerated  [place]: [place]   FULL CODE

## 2020-04-28 NOTE — CHART NOTE - NSCHARTNOTEFT_GEN_A_CORE
Received phone call directly from the patient put through by Clerk Garcia. Patient at the pharmacy with no Plaquenil script transmitted. Chart reviewed and noted course ending on 4/30 with script listed as unsubmitted. Given the nature of the medication and inability for retail pharmacies to fill; a script was sent to [facility] at the hospital. Patient informed and aware. Next dose due is 4/29/2020.

## 2020-04-28 NOTE — PROGRESS NOTE ADULT - REASON FOR ADMISSION
Fever/non-productive cough/SOB - rule out COVID-19

## 2020-04-28 NOTE — DISCHARGE NOTE NURSING/CASE MANAGEMENT/SOCIAL WORK - PATIENT PORTAL LINK FT
You can access the FollowMyHealth Patient Portal offered by [facility] by registering at the following website: [email or web address]. By joining [employer]’s FollowMyHealth portal, you will also be able to view your health information using other applications (apps) compatible with our system.

## 2020-04-28 NOTE — DISCHARGE NOTE PROVIDER - NSDCCPCAREPLAN_GEN_ALL_CORE_FT
PRINCIPAL DISCHARGE DIAGNOSIS  Diagnosis: COVID-19 virus infection  Assessment and Plan of Treatment: PRINCIPAL DISCHARGE DIAGNOSIS  Diagnosis: COVID-19 virus infection  Assessment and Plan of Treatment: You have tested POSITIVE for the novel coronavirus (COVID-19). Upon discharge, you must self-quarantine for 14 days. Please wear a face mask if you are around other individuals. Try to avoid contact with house members, family, and friends for the duration of this quarantine. Please follow up with your primary care physician within 2-3 weeks of your discharge from the hospital via tele-medicine. Please take all medications as prescribed. If you experience any worsening or recurrence of your symptoms, particularly worsening or high fever, shortness of breathe, extreme fatigue, or bloody cough please call 9-1-1 immediately or report to the nearest Emergency Department.      SECONDARY DISCHARGE DIAGNOSES  Diagnosis: Elevated hemoglobin A1c  Assessment and Plan of Treatment: Your hemoglobin A1c which is an average measurement of your blood sugars over 3 months was high. It is important for you to take your diabetic medications exactly as prescribed and to closely monitor your fingersticks and diet.

## 2020-05-12 DIAGNOSIS — R74.0 NONSPECIFIC ELEVATION OF LEVELS OF TRANSAMINASE AND LACTIC ACID DEHYDROGENASE [LDH]: ICD-10-CM

## 2020-05-12 DIAGNOSIS — U07.1 COVID-19: ICD-10-CM

## 2020-05-12 DIAGNOSIS — E66.01 MORBID (SEVERE) OBESITY DUE TO EXCESS CALORIES: ICD-10-CM

## 2020-05-12 DIAGNOSIS — Z79.84 LONG TERM (CURRENT) USE OF ORAL HYPOGLYCEMIC DRUGS: ICD-10-CM

## 2020-05-12 DIAGNOSIS — E11.65 TYPE 2 DIABETES MELLITUS WITH HYPERGLYCEMIA: ICD-10-CM

## 2020-05-12 DIAGNOSIS — E11.22 TYPE 2 DIABETES MELLITUS WITH DIABETIC CHRONIC KIDNEY DISEASE: ICD-10-CM

## 2020-05-12 DIAGNOSIS — I12.9 HYPERTENSIVE CHRONIC KIDNEY DISEASE WITH STAGE 1 THROUGH STAGE 4 CHRONIC KIDNEY DISEASE, OR UNSPECIFIED CHRONIC KIDNEY DISEASE: ICD-10-CM

## 2020-05-12 DIAGNOSIS — D64.9 ANEMIA, UNSPECIFIED: ICD-10-CM

## 2020-05-12 DIAGNOSIS — Z79.899 OTHER LONG TERM (CURRENT) DRUG THERAPY: ICD-10-CM

## 2020-05-12 DIAGNOSIS — J12.89 OTHER VIRAL PNEUMONIA: ICD-10-CM

## 2020-05-12 DIAGNOSIS — E87.1 HYPO-OSMOLALITY AND HYPONATREMIA: ICD-10-CM

## 2020-05-12 DIAGNOSIS — A41.89 OTHER SPECIFIED SEPSIS: ICD-10-CM

## 2020-05-12 DIAGNOSIS — N18.2 CHRONIC KIDNEY DISEASE, STAGE 2 (MILD): ICD-10-CM

## 2020-09-02 ENCOUNTER — TRANSCRIPTION ENCOUNTER (OUTPATIENT)
Age: 60
End: 2020-09-02

## 2021-07-20 PROBLEM — E11.9 TYPE 2 DIABETES MELLITUS WITHOUT COMPLICATIONS: Chronic | Status: ACTIVE | Noted: 2020-04-26

## 2021-07-20 PROBLEM — E66.9 OBESITY, UNSPECIFIED: Chronic | Status: ACTIVE | Noted: 2020-04-26

## 2021-07-22 ENCOUNTER — APPOINTMENT (OUTPATIENT)
Dept: GASTROENTEROLOGY | Facility: CLINIC | Age: 61
End: 2021-07-22
Payer: MEDICARE

## 2021-07-22 DIAGNOSIS — Z86.39 PERSONAL HISTORY OF OTHER ENDOCRINE, NUTRITIONAL AND METABOLIC DISEASE: ICD-10-CM

## 2021-07-22 DIAGNOSIS — Z79.4 DIABETES MELLITUS DUE TO UNDERLYING CONDITION WITH OTHER DIABETIC KIDNEY COMPLICATION: ICD-10-CM

## 2021-07-22 DIAGNOSIS — E08.29 DIABETES MELLITUS DUE TO UNDERLYING CONDITION WITH OTHER DIABETIC KIDNEY COMPLICATION: ICD-10-CM

## 2021-07-22 DIAGNOSIS — K59.00 CONSTIPATION, UNSPECIFIED: ICD-10-CM

## 2021-07-22 DIAGNOSIS — R80.9 DIABETES MELLITUS DUE TO UNDERLYING CONDITION WITH OTHER DIABETIC KIDNEY COMPLICATION: ICD-10-CM

## 2021-07-22 DIAGNOSIS — Z86.79 PERSONAL HISTORY OF OTHER DISEASES OF THE CIRCULATORY SYSTEM: ICD-10-CM

## 2021-07-22 PROCEDURE — 99443: CPT | Mod: 95

## 2021-07-22 RX ORDER — LISINOPRIL 30 MG/1
TABLET ORAL
Refills: 0 | Status: ACTIVE | COMMUNITY

## 2021-07-22 RX ORDER — METFORMIN HYDROCHLORIDE 625 MG/1
TABLET ORAL
Refills: 0 | Status: ACTIVE | COMMUNITY

## 2021-07-22 RX ORDER — POLYETHYLENE GLYCOL 3350 17 G/17G
17 POWDER, FOR SOLUTION ORAL DAILY
Qty: 30 | Refills: 2 | Status: ACTIVE | COMMUNITY
Start: 2021-07-22 | End: 1900-01-01

## 2021-07-22 RX ORDER — LOSARTAN POTASSIUM 100 MG/1
TABLET, FILM COATED ORAL
Refills: 0 | Status: ACTIVE | COMMUNITY

## 2021-07-22 RX ORDER — SIMVASTATIN 40 MG/1
40 TABLET, FILM COATED ORAL
Refills: 0 | Status: ACTIVE | COMMUNITY

## 2021-07-26 NOTE — HISTORY OF PRESENT ILLNESS
[Home] : at home, [unfilled] , at the time of the visit. [Medical Office: ([facility])___] : at the medical office located in  [Verbal consent obtained from patient] : the patient, [unfilled] [Heartburn] : denies heartburn [Nausea] : denies nausea [Vomiting] : denies vomiting [Diarrhea] : denies diarrhea [Constipation] : denies constipation [Yellow Skin Or Eyes (Jaundice)] : denies jaundice [Abdominal Pain] : denies abdominal pain [Abdominal Swelling] : denies abdominal swelling [Rectal Pain] : denies rectal pain [FreeTextEntry4] : Sp [de-identified] : This is a 60 years old male PMHx of DM,HTN, Hypercholesteremia, Arthritis of knee,  referred by Dr. Castaneda for consultation, recent colonoscopy was done 03/23/2021 by  unable to reach Cecal polyp. This will be patients third colonoscopy. Patient endorses straining when moving his bowels daily.\par \par \par 3/23/21 Colonoscopy yield \par -Internal  hem 1st degree\par -Melanosis coli\par -Cecal mass\par \par The patient denies rectal bleeding, melena, diarrhea, change in bowel habits, change in stool caliber, weight loss, change in appetite, nausea, vomiting, difficulty swallowing, early satiety, abdominal pain, fever or chills. \par No family Hx of cancer\par \par

## 2021-07-26 NOTE — ASSESSMENT
[FreeTextEntry1] :  60 years old male PMHx of DM,HTN, Hypercholesteremia, Arthritis of knee,  referred by Dr. Castaneda for consultation, recent colonoscopy was done 03/23/2021 by  unable to reach Cecal polyp. \par This will be patients third colonoscopy. Patient endorses straining when moving his bowels daily.\par \par \par \par

## 2021-08-09 ENCOUNTER — OUTPATIENT (OUTPATIENT)
Dept: OUTPATIENT SERVICES | Facility: HOSPITAL | Age: 61
LOS: 1 days | Discharge: HOME | End: 2021-08-09

## 2021-08-09 DIAGNOSIS — Z11.59 ENCOUNTER FOR SCREENING FOR OTHER VIRAL DISEASES: ICD-10-CM

## 2021-08-12 RX ORDER — SITAGLIPTIN AND METFORMIN HYDROCHLORIDE 500; 50 MG/1; MG/1
1 TABLET, FILM COATED ORAL
Qty: 0 | Refills: 0 | DISCHARGE

## 2021-08-12 RX ORDER — NIFEDIPINE 30 MG
1 TABLET, EXTENDED RELEASE 24 HR ORAL
Qty: 0 | Refills: 0 | DISCHARGE

## 2021-08-13 ENCOUNTER — OUTPATIENT (OUTPATIENT)
Dept: OUTPATIENT SERVICES | Facility: HOSPITAL | Age: 61
LOS: 1 days | Discharge: HOME | End: 2021-08-13
Payer: MEDICARE

## 2021-08-13 ENCOUNTER — RESULT REVIEW (OUTPATIENT)
Age: 61
End: 2021-08-13

## 2021-08-13 ENCOUNTER — TRANSCRIPTION ENCOUNTER (OUTPATIENT)
Age: 61
End: 2021-08-13

## 2021-08-13 VITALS
SYSTOLIC BLOOD PRESSURE: 187 MMHG | RESPIRATION RATE: 18 BRPM | HEART RATE: 79 BPM | TEMPERATURE: 97 F | WEIGHT: 315 LBS | HEIGHT: 73 IN | DIASTOLIC BLOOD PRESSURE: 101 MMHG

## 2021-08-13 VITALS — SYSTOLIC BLOOD PRESSURE: 168 MMHG | DIASTOLIC BLOOD PRESSURE: 89 MMHG | OXYGEN SATURATION: 96 % | HEART RATE: 88 BPM

## 2021-08-13 PROCEDURE — 88305 TISSUE EXAM BY PATHOLOGIST: CPT | Mod: 26

## 2021-08-13 PROCEDURE — 45385 COLONOSCOPY W/LESION REMOVAL: CPT | Mod: 52

## 2021-08-13 PROCEDURE — 45381 COLONOSCOPY SUBMUCOUS NJX: CPT | Mod: XU,52

## 2021-08-13 NOTE — ASU PATIENT PROFILE, ADULT - NSICDXPASTMEDICALHX_GEN_ALL_CORE_FT
PAST MEDICAL HISTORY:  DM (diabetes mellitus)     High cholesterol     HTN (hypertension)     Obesity

## 2021-08-13 NOTE — ASU DISCHARGE PLAN (ADULT/PEDIATRIC) - CARE PROVIDER_API CALL
Kash Garcia)  Gastroenterology; Internal Medicine  [address] [place] [address]  Phone: [phone]  Fax: [phone]  Follow Up Time:

## 2021-08-13 NOTE — H&P PST ADULT - HISTORY OF PRESENT ILLNESS
This is a 61 years old male PMHx of DM,HTN, Hypercholesteremia, Arthritis of knee, is here for colonoscopy and EMR  patient referred by Dr. Castaneda for consultation, recent colonoscopy was done 03/23/2021 by  unable to reach Cecal polyp. This will be patients third colonoscopy. Patient endorses straining when moving his bowels daily.

## 2021-08-13 NOTE — ASU DISCHARGE PLAN (ADULT/PEDIATRIC) - NPI NUMBER (FOR SYSADMIN USE ONLY) :
POD#0, status post cataract surgery, left eye    No complaints.  Denies eye pain.    VA at least CF at 3'    IOP 15 mm Hg by Tonopen    Impression/Plan:  Pseudophakia, left eye: POD0, good post-operative appearance. IOP reasonable.    -Ofloxacin QID x1 week  -PF taper 4-3-2-1 x1 week each    Eye protection at all times and eye shield at night for 1 week.    Limited activities with no exercise or heavy lifting for 1 week.    Instructed patient to contact us for decreasing vision, eye pain, new floaters or flashes of light or other concerning symptoms.    Written instructions given    Return to clinic on 7/19, as previously scheduled.    Elsy Seymour MD  Ophthalmology Resident, PGY-3      Teaching statement:  Complete documentation of historical and exam elements from today's encounter can be found in the full encounter summary report (not reduplicated in this progress note). I personally obtained the chief complaint(s) and history of present illness.  I confirmed and edited as necessary the review of systems, past medical/surgical history, family history, social history, and examination findings as documented by others; and I examined the patient myself. I personally reviewed the relevant tests, images, and reports as documented above.     I formulated and edited as necessary the assessment and plan and discussed the findings and management plan with the patient and family.    Justa Liz MD  Comprehensive Ophthalmology & Ocular Pathology  Department of Ophthalmology and Visual Neurosciences  [email or web address]  Pager [phone]    
[[phone]]

## 2021-08-16 LAB — SURGICAL PATHOLOGY STUDY: SIGNIFICANT CHANGE UP

## 2021-08-18 ENCOUNTER — APPOINTMENT (OUTPATIENT)
Dept: NEUROSURGERY | Facility: CLINIC | Age: 61
End: 2021-08-18
Payer: MEDICARE

## 2021-08-18 VITALS — WEIGHT: 315 LBS | BODY MASS INDEX: 41.75 KG/M2 | HEIGHT: 73 IN

## 2021-08-18 DIAGNOSIS — Z87.39 PERSONAL HISTORY OF OTHER DISEASES OF THE MUSCULOSKELETAL SYSTEM AND CONNECTIVE TISSUE: ICD-10-CM

## 2021-08-18 DIAGNOSIS — Z78.9 OTHER SPECIFIED HEALTH STATUS: ICD-10-CM

## 2021-08-18 DIAGNOSIS — M54.16 RADICULOPATHY, LUMBAR REGION: ICD-10-CM

## 2021-08-18 PROBLEM — E78.00 PURE HYPERCHOLESTEROLEMIA, UNSPECIFIED: Chronic | Status: ACTIVE | Noted: 2021-08-13

## 2021-08-18 PROBLEM — I10 ESSENTIAL (PRIMARY) HYPERTENSION: Chronic | Status: ACTIVE | Noted: 2021-08-13

## 2021-08-18 PROCEDURE — 99204 OFFICE O/P NEW MOD 45 MIN: CPT

## 2021-08-18 RX ORDER — GABAPENTIN 300 MG/1
300 CAPSULE ORAL
Qty: 90 | Refills: 2 | Status: ACTIVE | COMMUNITY
Start: 2021-08-18 | End: 1900-01-01

## 2021-08-18 NOTE — HISTORY OF PRESENT ILLNESS
[de-identified] : Mr. Muñzo developed left sided back pain with radiation into the left anterior thigh. He also has isolated left knee pain secondary to DJD. He has been taking Naproxen which has helped ease some of his pain. He saw PT yesterday for an evaluation and is scheduled to start treatments. No injections. No bowel / bladder dysfunction.\par \par We have reviewed his MRI lumbar spine from [facility] 7/20/21 today. He has lumbar spondylosis with a left L4/5 foraminal herniated disc. He also has some foraminal stenosis at L5/S1. \par

## 2021-08-18 NOTE — ASSESSMENT
[FreeTextEntry1] : We have had a thorough discussion regarding his current condition, findings, and treatment options. Mr. QUIROZ will proceed with physical therapy. He will continue on naproxen PRN and will start Gabapentin. If his symptoms do not fully improve then he will consider LESIs for pain management. I will see him back in 6 weeks for reassessment.

## 2021-08-19 DIAGNOSIS — E11.9 TYPE 2 DIABETES MELLITUS WITHOUT COMPLICATIONS: ICD-10-CM

## 2021-08-19 DIAGNOSIS — Z79.84 LONG TERM (CURRENT) USE OF ORAL HYPOGLYCEMIC DRUGS: ICD-10-CM

## 2021-08-19 DIAGNOSIS — D12.0 BENIGN NEOPLASM OF CECUM: ICD-10-CM

## 2021-08-19 DIAGNOSIS — E66.9 OBESITY, UNSPECIFIED: ICD-10-CM

## 2021-08-19 DIAGNOSIS — I10 ESSENTIAL (PRIMARY) HYPERTENSION: ICD-10-CM

## 2021-08-19 DIAGNOSIS — Z86.010 PERSONAL HISTORY OF COLONIC POLYPS: ICD-10-CM

## 2021-08-31 NOTE — REASON FOR VISIT
[Follow-Up: _____] : a [unfilled] follow-up visit [FreeTextEntry1] : colon/EMR results- referred by Dr Castaneda

## 2021-08-31 NOTE — HISTORY OF PRESENT ILLNESS
[Home] : at home, [unfilled] , at the time of the visit. [Medical Office: ([facility])___] : at the medical office located in  [Verbal consent obtained from patient] : the patient, [unfilled] [_________] : Performed [unfilled]

## 2021-09-01 ENCOUNTER — APPOINTMENT (OUTPATIENT)
Dept: GASTROENTEROLOGY | Facility: CLINIC | Age: 61
End: 2021-09-01

## 2021-09-03 ENCOUNTER — OUTPATIENT (OUTPATIENT)
Dept: OUTPATIENT SERVICES | Facility: HOSPITAL | Age: 61
LOS: 1 days | Discharge: HOME | End: 2021-09-03

## 2021-09-30 ENCOUNTER — APPOINTMENT (OUTPATIENT)
Dept: NEUROSURGERY | Facility: CLINIC | Age: 61
End: 2021-09-30

## 2022-01-14 ENCOUNTER — APPOINTMENT (OUTPATIENT)
Dept: GASTROENTEROLOGY | Facility: CLINIC | Age: 62
End: 2022-01-14
Payer: MEDICARE

## 2022-01-14 PROCEDURE — 99443: CPT

## 2022-01-14 RX ORDER — NIFEDIPINE 90 MG/1
90 TABLET, EXTENDED RELEASE ORAL
Qty: 90 | Refills: 0 | Status: ACTIVE | COMMUNITY
Start: 2021-10-21

## 2022-01-14 RX ORDER — HYDROCHLOROTHIAZIDE 25 MG/1
25 TABLET ORAL
Qty: 90 | Refills: 0 | Status: ACTIVE | COMMUNITY
Start: 2020-12-09

## 2022-01-14 NOTE — ASSESSMENT
[FreeTextEntry1] : Patient is a 61 years old male PMHx of DM,HTN, Hypercholesteremia, Arthritis of knee,  referred by Dr. Castaneda for consultation, recent colonoscopy was done 03/23/2021 by  unable to reach Cecal polyp. PAtient requires Colon with EMR utilizing Pathfinder device. \par \par Cecal Polyp/ Obese\par - Colon with EMR needs to be booked with Pathfinder device\par - golytely Prep\par \par

## 2022-01-14 NOTE — HISTORY OF PRESENT ILLNESS
[Home] : at home, [unfilled] , at the time of the visit. [Medical Office: ([facility])___] : at the medical office located in  [Verbal consent obtained from patient] : the patient, [unfilled] [___ Month(s) Ago] : [unfilled] month(s) ago [_________] : Performed [unfilled] [FreeTextEntry4] : Sp [Heartburn] : denies heartburn [Nausea] : denies nausea [Vomiting] : denies vomiting [Diarrhea] : denies diarrhea [Constipation] : denies constipation [Yellow Skin Or Eyes (Jaundice)] : denies jaundice [Abdominal Pain] : denies abdominal pain [Abdominal Swelling] : denies abdominal swelling [Rectal Pain] : denies rectal pain [de-identified] : Patient is a 61 years old male PMHx of DM,HTN, Hypercholesteremia, Arthritis of knee,  referred by Dr. Castaneda for consultation, recent colonoscopy was done 03/23/2021 by  unable to reach Cecal polyp. PAtient requires Colon with EMR utilizing Pathfinder device. \par \par \par

## 2022-03-23 ENCOUNTER — APPOINTMENT (OUTPATIENT)
Dept: GASTROENTEROLOGY | Facility: CLINIC | Age: 62
End: 2022-03-23

## 2022-10-13 ENCOUNTER — APPOINTMENT (OUTPATIENT)
Dept: GASTROENTEROLOGY | Facility: CLINIC | Age: 62
End: 2022-10-13

## 2022-10-13 DIAGNOSIS — K63.5 POLYP OF COLON: ICD-10-CM

## 2022-10-13 PROCEDURE — 99442: CPT

## 2022-10-13 RX ORDER — POLYETHYLENE GLYCOL 3350 AND ELECTROLYTES WITH LEMON FLAVOR 236; 22.74; 6.74; 5.86; 2.97 G/4L; G/4L; G/4L; G/4L; G/4L
236 POWDER, FOR SOLUTION ORAL
Qty: 1 | Refills: 0 | Status: DISCONTINUED | COMMUNITY
Start: 2021-08-12 | End: 2022-10-13

## 2022-10-13 RX ORDER — POLYETHYLENE GLYCOL 3350 AND ELECTROLYTES WITH LEMON FLAVOR 236; 22.74; 6.74; 5.86; 2.97 G/4L; G/4L; G/4L; G/4L; G/4L
236 POWDER, FOR SOLUTION ORAL
Qty: 1 | Refills: 0 | Status: ACTIVE | COMMUNITY
Start: 2022-10-13 | End: 1900-01-01

## 2022-10-13 RX ORDER — POLYETHYLENE GLYCOL 3350 AND ELECTROLYTES WITH LEMON FLAVOR 236; 22.74; 6.74; 5.86; 2.97 G/4L; G/4L; G/4L; G/4L; G/4L
236 POWDER, FOR SOLUTION ORAL
Qty: 1 | Refills: 0 | Status: DISCONTINUED | COMMUNITY
Start: 2022-01-14 | End: 2022-10-13

## 2022-10-13 RX ORDER — SODIUM SULFATE, POTASSIUM SULFATE, MAGNESIUM SULFATE 17.5; 3.13; 1.6 G/ML; G/ML; G/ML
17.5-3.13-1.6 SOLUTION, CONCENTRATE ORAL
Qty: 1 | Refills: 0 | Status: DISCONTINUED | COMMUNITY
Start: 2021-07-22 | End: 2022-10-13

## 2022-10-13 NOTE — HISTORY OF PRESENT ILLNESS
[Home] : at home, [unfilled] , at the time of the visit. [Other Location: e.g. Home (Enter Location, City,State)___] : at [unfilled] [Family Member] : family member [Verbal consent obtained from patient] : the patient, [unfilled] [FreeTextEntry4] : Susi [___ Month(s) Ago] : [unfilled] month(s) ago [Heartburn] : denies heartburn [Nausea] : denies nausea [Vomiting] : denies vomiting [Diarrhea] : denies diarrhea [Constipation] : denies constipation [Yellow Skin Or Eyes (Jaundice)] : denies jaundice [Abdominal Pain] : denies abdominal pain [Abdominal Swelling] : denies abdominal swelling [Rectal Pain] : denies rectal pain [_________] : Performed [unfilled] [de-identified] : Patient is a 62 years old male PMHx of DM,HTN, Hypercholesteremia, Arthritis of knee,  referred by Dr. Castaneda for consultation, recent colonoscopy was done 03/23/2021 by  unable to reach Cecal polyp. Patient requires Colon with EMR utilizing Pathfinder device. Feels well without change in stools, change in weight, or decrease appetite. \par \par \par

## 2022-10-13 NOTE — ASSESSMENT
[FreeTextEntry1] : Patient is a 62 years old male PMHx of DM,HTN, Hypercholesteremia, Arthritis of knee,  referred by Dr. Castaneda for consultation, recent colonoscopy was done 03/23/2021 by  unable to reach Cecal polyp. Patient requires Colon with EMR utilizing Pathfinder device. Feels well without change in stools, change in weight, or decrease appetite. Patient did missed follow up so will plan procedure as soon as possible. \par \par Cecal Polyp/ Obese\par - Colon with EMR needs to be booked with Pathfinder device\par - Golytely Prep\par \par

## 2022-10-17 ENCOUNTER — APPOINTMENT (OUTPATIENT)
Dept: UROLOGY | Facility: CLINIC | Age: 62
End: 2022-10-17

## 2022-10-17 VITALS
HEART RATE: 79 BPM | SYSTOLIC BLOOD PRESSURE: 145 MMHG | HEIGHT: 73 IN | RESPIRATION RATE: 16 BRPM | WEIGHT: 315 LBS | DIASTOLIC BLOOD PRESSURE: 85 MMHG | BODY MASS INDEX: 41.75 KG/M2

## 2022-10-17 VITALS
HEIGHT: 73 IN | WEIGHT: 152 LBS | RESPIRATION RATE: 14 BRPM | DIASTOLIC BLOOD PRESSURE: 60 MMHG | TEMPERATURE: 98 F | BODY MASS INDEX: 20.15 KG/M2 | HEART RATE: 82 BPM | SYSTOLIC BLOOD PRESSURE: 138 MMHG

## 2022-10-17 DIAGNOSIS — E78.00 PURE HYPERCHOLESTEROLEMIA, UNSPECIFIED: ICD-10-CM

## 2022-10-17 DIAGNOSIS — Z00.00 ENCOUNTER FOR GENERAL ADULT MEDICAL EXAMINATION W/OUT ABNORMAL FINDINGS: ICD-10-CM

## 2022-10-17 DIAGNOSIS — Z80.9 FAMILY HISTORY OF MALIGNANT NEOPLASM, UNSPECIFIED: ICD-10-CM

## 2022-10-17 DIAGNOSIS — I10 ESSENTIAL (PRIMARY) HYPERTENSION: ICD-10-CM

## 2022-10-17 PROCEDURE — 81003 URINALYSIS AUTO W/O SCOPE: CPT | Mod: QW

## 2022-10-17 PROCEDURE — 99204 OFFICE O/P NEW MOD 45 MIN: CPT

## 2022-10-17 RX ORDER — SITAGLIPTIN AND METFORMIN HYDROCHLORIDE 50; 1000 MG/1; MG/1
50-1000 TABLET, FILM COATED ORAL
Refills: 0 | Status: DISCONTINUED | COMMUNITY
End: 2022-10-17

## 2022-10-18 PROBLEM — Z80.9 FAMILY HISTORY OF MALIGNANT NEOPLASM: Status: ACTIVE | Noted: 2022-10-17

## 2022-10-18 PROBLEM — I10 HIGH BLOOD PRESSURE: Status: ACTIVE | Noted: 2022-10-17

## 2022-10-18 PROBLEM — E78.00 HIGH BLOOD CHOLESTEROL LEVEL: Status: ACTIVE | Noted: 2022-10-17

## 2022-10-18 LAB
BILIRUB UR QL STRIP: NORMAL
COLLECTION METHOD: NORMAL
GLUCOSE UR-MCNC: 1000
HCG UR QL: 0.2 EU/DL
HGB UR QL STRIP.AUTO: NORMAL
KETONES UR-MCNC: NORMAL
LEUKOCYTE ESTERASE UR QL STRIP: NORMAL
NITRITE UR QL STRIP: NORMAL
PH UR STRIP: 5.5
PROT UR STRIP-MCNC: NORMAL
SP GR UR STRIP: 1.01

## 2022-10-18 NOTE — HISTORY OF PRESENT ILLNESS
[FreeTextEntry1] : 63 y/o male who was referred by his PCP who also happens to be his daughter. His most recent PSA was 5.65 and according to the pt, that was his very first one. He complains of nocturia x5-6 but states that it has been like this his whole life. He had some imaging done where they found an abdominal mass. Pt has seen Dr. Garcia regarding that issue. He denies any urinary frequency or urgency. \par \par pmhx: DM, HTN, arthritis \par \par pshx: unremarkable \par \par allergies: NKDA \par \par

## 2022-10-18 NOTE — ASSESSMENT
[FreeTextEntry1] : 61 y/o male presents with with an elevated PSA and nocturia. We discussed these issues in detail. I will order a MRI of the prostate and have the pt follow up after that. All his questions were otherwise answered. Total time = 45 mins. \par \par The submitted E/M billing level for this visit reflects the total time spent on the day of the visit including face-to-face time spent with the patient, non-face-to-face review of medical records and relevant information, documentation, and asynchronous communication with the patient after a visit via phone, email, or patient’s EHR portal after the visit. \par The medical records reviewed are either scanned into the chart or reviewed with the patient using a patient’s electronic medical records portal for patients with records not available to [facility] via electronic transmission platforms from other institutions and labs. \par Time spend counseling and performing coordination of care was also included in determining the appropriate EM billing level.\par \par I have reviewed and verified information regarding the chief complaint and history recorded by the ancillary staff and/or the patient. I have independently reviewed and interpreted tests performed by other physicians and facilities as necessary. \par \par I have discussed with the patient differential diagnosis, reason for auxiliary tests if ordered, risks, benefits, alternatives, and complications of each form of therapy were discussed.\par

## 2022-10-18 NOTE — PHYSICAL EXAM
[General Appearance - Well Nourished] : well nourished [Normal Appearance] : normal appearance [General Appearance - In No Acute Distress] : no acute distress [Abdomen Soft] : soft [Abdomen Tenderness] : non-tender [Abdomen Mass (___ Cm)] : no abdominal mass palpated [Costovertebral Angle Tenderness] : no ~M costovertebral angle tenderness [Urethral Meatus] : meatus normal [Penis Abnormality] : normal circumcised penis [No Prostate Nodules] : no prostate nodules [Prostate Size ___ gm] : prostate size [unfilled] gm [Oriented To Time, Place, And Person] : oriented to person, place, and time [Affect] : the affect was normal [] : no hepato-splenomegaly [Epididymis] : the epididymides were normal [Testes Tenderness] : no tenderness of the testes [Testes Mass (___cm)] : there were no testicular masses [FreeTextEntry1] : Normal rectal tone. Right testes is atrophic and slightly smaller than the left.

## 2022-10-31 ENCOUNTER — APPOINTMENT (OUTPATIENT)
Dept: UROLOGY | Facility: CLINIC | Age: 62
End: 2022-10-31

## 2022-11-29 NOTE — ED PROCEDURE NOTE - ATTENDING CONTRIBUTION TO CARE
Spoke with Ashley per signed release, advised OTC ibu/tyl, rest and fluids.   Patient with poor peripheral veins, RN unable to obtain the IV access. US guidance needed. Patient permission obtained, area prepped and draped sterilely. #18 Guaze Angiocath placed with good back flow and flushed well. Angiocath secured with sterile dressing. Patient tolerated procedure well and no complications noted. I was present Key parts of the procedure and supervised.

## 2022-12-09 ENCOUNTER — APPOINTMENT (OUTPATIENT)
Dept: UROLOGY | Facility: CLINIC | Age: 62
End: 2022-12-09

## 2022-12-09 VITALS
WEIGHT: 315 LBS | HEIGHT: 73 IN | DIASTOLIC BLOOD PRESSURE: 94 MMHG | HEART RATE: 86 BPM | SYSTOLIC BLOOD PRESSURE: 156 MMHG | TEMPERATURE: 99.3 F | BODY MASS INDEX: 41.75 KG/M2 | RESPIRATION RATE: 18 BRPM | OXYGEN SATURATION: 98 %

## 2022-12-09 DIAGNOSIS — R97.20 ELEVATED PROSTATE, SPECIFIC ANTIGEN [PSA]: ICD-10-CM

## 2022-12-09 DIAGNOSIS — M51.26 OTHER INTERVERTEBRAL DISC DISPLACEMENT, LUMBAR REGION: ICD-10-CM

## 2022-12-09 DIAGNOSIS — E11.9 TYPE 2 DIABETES MELLITUS W/OUT COMPLICATIONS: ICD-10-CM

## 2022-12-09 DIAGNOSIS — C18.9 MALIGNANT NEOPLASM OF COLON, UNSPECIFIED: ICD-10-CM

## 2022-12-09 PROCEDURE — 99214 OFFICE O/P EST MOD 30 MIN: CPT

## 2022-12-09 PROCEDURE — 81003 URINALYSIS AUTO W/O SCOPE: CPT | Mod: QW

## 2022-12-12 PROBLEM — R97.20 ELEVATED PSA: Status: ACTIVE | Noted: 2022-10-17

## 2022-12-12 PROBLEM — C18.9 COLON CANCER: Status: ACTIVE | Noted: 2022-10-17

## 2022-12-12 PROBLEM — M51.26 LUMBAR HERNIATED DISC: Status: ACTIVE | Noted: 2021-08-18

## 2022-12-12 PROBLEM — E11.9 DIABETES: Status: ACTIVE | Noted: 2022-10-17

## 2022-12-12 LAB
BILIRUB UR QL STRIP: NORMAL
COLLECTION METHOD: NORMAL
GLUCOSE UR-MCNC: NORMAL
HCG UR QL: 0.2 EU/DL
HGB UR QL STRIP.AUTO: NORMAL
KETONES UR-MCNC: NORMAL
LEUKOCYTE ESTERASE UR QL STRIP: NORMAL
NITRITE UR QL STRIP: NORMAL
PH UR STRIP: 7
PROT UR STRIP-MCNC: NORMAL
SP GR UR STRIP: 1.02

## 2022-12-12 NOTE — ASSESSMENT
[FreeTextEntry1] : 61 y/o male with a hx elevated PSA comes to discuss his MRI results. We discussed the results in detail. I asked the pt to repeat a PSA in 6 months and follow up after. All his questions were otherwise answered. Options including observation and bx discussed with attendant risks and benedits in detail.\par \par The submitted E/M billing level for this visit reflects the total time spent on the day of the visit including face-to-face time spent with the patient, non-face-to-face review of medical records and relevant information, documentation, and asynchronous communication with the patient after a visit via phone, email, or patient’s EHR portal after the visit. \par The medical records reviewed are either scanned into the chart or reviewed with the patient using a patient’s electronic medical records portal for patients with records not available to [facility] via electronic transmission platforms from other institutions and labs. \par Time spend counseling and performing coordination of care was also included in determining the appropriate EM billing level.\par \par I have reviewed and verified information regarding the chief complaint and history recorded by the ancillary staff and/or the patient. I have independently reviewed and interpreted tests performed by other physicians and facilities as necessary. \par \par I have discussed with the patient differential diagnosis, reason for auxiliary tests if ordered, risks, benefits, alternatives, and complications of each form of therapy were discussed.\par

## 2022-12-12 NOTE — HISTORY OF PRESENT ILLNESS
[FreeTextEntry1] : 61 y/o male who presents with an elevated PSA of 5.6 underwent an MRI of the prostate (details noted below). He denies any urinary symptoms today and is doing well. He feels that his stream is adequate. \par \par MRI Prostate w/wo IV Cont 11/17/22:\par - 10 x 4 mm left posterolateral midgland (extending to base) peripheral zone PI-RADS 2 lesion. \par - Prostate volume: 68 cc

## 2023-01-18 ENCOUNTER — APPOINTMENT (OUTPATIENT)
Dept: GASTROENTEROLOGY | Facility: CLINIC | Age: 63
End: 2023-01-18

## 2023-02-15 ENCOUNTER — APPOINTMENT (OUTPATIENT)
Dept: GASTROENTEROLOGY | Facility: CLINIC | Age: 63
End: 2023-02-15

## 2023-05-11 NOTE — ED ADULT TRIAGE NOTE - MEANS OF ARRIVAL
stretcher Topical Ketoconazole Pregnancy And Lactation Text: This medication is Pregnancy Category B and is considered safe during pregnancy. It is unknown if it is excreted in breast milk.

## 2023-06-09 ENCOUNTER — APPOINTMENT (OUTPATIENT)
Dept: UROLOGY | Facility: CLINIC | Age: 63
End: 2023-06-09

## 2024-08-12 ENCOUNTER — NON-APPOINTMENT (OUTPATIENT)
Age: 64
End: 2024-08-12

## 2024-08-12 ENCOUNTER — OUTPATIENT (OUTPATIENT)
Dept: OUTPATIENT SERVICES | Facility: HOSPITAL | Age: 64
LOS: 1 days | End: 2024-08-12
Payer: MEDICARE

## 2024-08-12 ENCOUNTER — APPOINTMENT (OUTPATIENT)
Age: 64
End: 2024-08-12

## 2024-08-12 VITALS
TEMPERATURE: 98.6 F | BODY MASS INDEX: 39.01 KG/M2 | DIASTOLIC BLOOD PRESSURE: 88 MMHG | HEART RATE: 100 BPM | WEIGHT: 288 LBS | HEIGHT: 72 IN | OXYGEN SATURATION: 99 % | SYSTOLIC BLOOD PRESSURE: 145 MMHG

## 2024-08-12 DIAGNOSIS — C78.7 SECONDARY MALIGNANT NEOPLASM OF LIVER AND INTRAHEPATIC BILE DUCT: ICD-10-CM

## 2024-08-12 DIAGNOSIS — C18.9 MALIGNANT NEOPLASM OF COLON, UNSPECIFIED: ICD-10-CM

## 2024-08-12 LAB
ALBUMIN SERPL ELPH-MCNC: 4.1 G/DL
ALP BLD-CCNC: 153 U/L
ALT SERPL-CCNC: 26 U/L
ANION GAP SERPL CALC-SCNC: 17 MMOL/L
AST SERPL-CCNC: 50 U/L
BASOPHILS # BLD AUTO: 0.05 K/UL — SIGNIFICANT CHANGE UP (ref 0–0.2)
BASOPHILS NFR BLD AUTO: 0.4 % — SIGNIFICANT CHANGE UP (ref 0–1)
BILIRUB DIRECT SERPL-MCNC: 0.2 MG/DL
BILIRUB INDIRECT SERPL-MCNC: 0.4 MG/DL
BILIRUB SERPL-MCNC: 0.6 MG/DL
BUN SERPL-MCNC: 20 MG/DL
CALCIUM SERPL-MCNC: 9.6 MG/DL
CHLORIDE SERPL-SCNC: 93 MMOL/L
CO2 SERPL-SCNC: 27 MMOL/L
CREAT SERPL-MCNC: 1.2 MG/DL
EGFR: 67 ML/MIN/1.73M2
EOSINOPHIL # BLD AUTO: 0.02 K/UL — SIGNIFICANT CHANGE UP (ref 0–0.7)
EOSINOPHIL NFR BLD AUTO: 0.2 % — SIGNIFICANT CHANGE UP (ref 0–8)
GLUCOSE SERPL-MCNC: 89 MG/DL
HCT VFR BLD CALC: 31.3 %
HCT VFR BLD CALC: 31.4 % — LOW (ref 42–52)
HGB BLD-MCNC: 10.7 G/DL
HGB BLD-MCNC: 10.8 G/DL — LOW (ref 14–18)
IMM GRANULOCYTES NFR BLD AUTO: 0.3 % — SIGNIFICANT CHANGE UP (ref 0.1–0.3)
LYMPHOCYTES # BLD AUTO: 1.21 K/UL — SIGNIFICANT CHANGE UP (ref 1.2–3.4)
LYMPHOCYTES # BLD AUTO: 9.6 % — LOW (ref 20.5–51.1)
MCHC RBC-ENTMCNC: 27.6 PG
MCHC RBC-ENTMCNC: 27.7 PG — SIGNIFICANT CHANGE UP (ref 27–31)
MCHC RBC-ENTMCNC: 34.2 G/DL
MCHC RBC-ENTMCNC: 34.4 G/DL — SIGNIFICANT CHANGE UP (ref 32–37)
MCV RBC AUTO: 80.5 FL — SIGNIFICANT CHANGE UP (ref 80–94)
MCV RBC AUTO: 80.7 FL
MONOCYTES # BLD AUTO: 1.42 K/UL — HIGH (ref 0.1–0.6)
MONOCYTES NFR BLD AUTO: 11.2 % — HIGH (ref 1.7–9.3)
NEUTROPHILS # BLD AUTO: 9.9 K/UL — HIGH (ref 1.4–6.5)
NEUTROPHILS NFR BLD AUTO: 78.3 % — HIGH (ref 42.2–75.2)
NRBC # BLD: 0 /100 WBCS — SIGNIFICANT CHANGE UP (ref 0–0)
PLATELET # BLD AUTO: 463 K/UL
PLATELET # BLD AUTO: 502 K/UL — HIGH (ref 130–400)
PMV BLD: 10 FL
PMV BLD: 10.6 FL — HIGH (ref 7.4–10.4)
POTASSIUM SERPL-SCNC: 3.1 MMOL/L
PROT SERPL-MCNC: 7.2 G/DL
RBC # BLD: 3.88 M/UL
RBC # BLD: 3.9 M/UL — LOW (ref 4.7–6.1)
RBC # FLD: 13.4 % — SIGNIFICANT CHANGE UP (ref 11.5–14.5)
RBC # FLD: 13.6 %
SODIUM SERPL-SCNC: 137 MMOL/L
WBC # BLD: 12.64 K/UL — HIGH (ref 4.8–10.8)
WBC # FLD AUTO: 12.55 K/UL
WBC # FLD AUTO: 12.64 K/UL — HIGH (ref 4.8–10.8)

## 2024-08-12 PROCEDURE — 99205 OFFICE O/P NEW HI 60 MIN: CPT

## 2024-08-12 PROCEDURE — 80048 BASIC METABOLIC PNL TOTAL CA: CPT

## 2024-08-12 PROCEDURE — 80076 HEPATIC FUNCTION PANEL: CPT

## 2024-08-12 PROCEDURE — 82378 CARCINOEMBRYONIC ANTIGEN: CPT

## 2024-08-12 PROCEDURE — 82728 ASSAY OF FERRITIN: CPT

## 2024-08-12 PROCEDURE — 85027 COMPLETE CBC AUTOMATED: CPT

## 2024-08-12 PROCEDURE — 36415 COLL VENOUS BLD VENIPUNCTURE: CPT

## 2024-08-12 PROCEDURE — 82105 ALPHA-FETOPROTEIN SERUM: CPT

## 2024-08-12 RX ORDER — SEMAGLUTIDE 0.68 MG/ML
2 INJECTION, SOLUTION SUBCUTANEOUS
Refills: 0 | Status: ACTIVE | COMMUNITY

## 2024-08-12 RX ORDER — POTASSIUM CHLORIDE 1500 MG/1
20 TABLET, FILM COATED, EXTENDED RELEASE ORAL
Qty: 6 | Refills: 1 | Status: ACTIVE | COMMUNITY
Start: 2024-08-12 | End: 1900-01-01

## 2024-08-12 RX ORDER — PIOGLITAZONE HYDROCHLORIDE 30 MG/1
30 TABLET ORAL
Refills: 0 | Status: ACTIVE | COMMUNITY

## 2024-08-12 RX ORDER — GLIPIZIDE AND METFORMIN HYDROCHLORIDE 5; 500 MG/1; MG/1
5-500 TABLET, FILM COATED ORAL
Refills: 0 | Status: ACTIVE | COMMUNITY

## 2024-08-12 RX ORDER — NAPROXEN 500 MG/1
500 TABLET ORAL
Refills: 0 | Status: ACTIVE | COMMUNITY

## 2024-08-12 NOTE — ASSESSMENT
[FreeTextEntry1] : 64 years old male with PMHx of HTN, DM, Arthritis who presents for the discussion about newly found hepatic mets.  # Suspected metastatic (Ascending) Colon cancer with mets to liver, peritoneum and lungs.  - Initially found to have a sessile polyp in 2021 - s/p partial resection of polyp via EMR in 08/2021, pathology showed tubular adenoma - CT abdomen/pelvis (08/2024): Mild hepatomegaly, with various liver lesions. Largest lesion localized to right hepatic lobe measuring 8 cm in diameter. Also, 8.8 cm segment of abnormal proximal colon which revealed marked circumferential wall thickening raising suspicion for annular constricting lesions. It also showed severe, asymmetric loculated ascites with evidence of carcinomatosis.  - ECOG 1 - No family history of cancer - Bowel movements normal  Recommendations:  - check blood work including CBC, BMP, hepatic function panel, CEA, AFP, PT/INR - Will arrange CT guided biopsy of one of liver lesions. Will need to send for NGS for KRAS,NRAS, BRAF and MMR status - IR guided paracentesis for diagnostic and palliative reason - Pt will probably need chemo-port - will obtain baseline PET-scan , script given to patient   We discussed the imaging findings of the CT scans that patient had at an outside facility. Patient is suspected to have metastatic ascending colon cancer with metastasis to liver, lungs and peritoneal cavity. We told the family that the goal of the treatment is to prolong patient's life.   We will obtain biopsy of the liver lesion and send the tissue for molecular testing. Once the pathology results are back we will regroup and discuss the treatment options and side effects associated with it. If turns out to be colorectal cancer, the standard treatment would be FOLFOX (if MSI low). he is having regular bowel movements so no concern for bowel obstruction at this point.   All their questions were answered up to their satisfaction.

## 2024-08-12 NOTE — HISTORY OF PRESENT ILLNESS
[de-identified] : 64 years old male with PMHx of HTN, DM, Arthritis who presents for the discussion about newly found hepatic mets.   Initially, patient had a colonoscopy in 03/2021, patient was found to have cecal polyp that could not be resected so he was referred to Dr. Garcia for the EMR of cecal polyp. In 08/2021, patient had colonoscopy in which patient was found to 7-8 cm sessile cecal polyp, that was "partially" removed via EMR, however, it was a long procedure and patient needed intubation, it was a difficult intubation, and the procedure was aborted with the plan to complete resection with a specialized "Pathfinder device". Patient was lost for follow up.  3 months ago, patient noticed abdominal distention, pain and early satiety. He had a CT abdomen done that revealed " Mild hepatomegaly, with various liver lesions. Largest lesion localized to right hepatic lobe measuring 8 cm in diameter. Alos, 8.8 cm segment of abnormal proximal colon which revealed marked circumferential wall thickening raising suspicion for annular constricting lesions. It also showed severe, asymmetric loculated ascites with evidence of carcinomatosis.   Patient reports early satiety, abdominal distention. He reports regular bowel movements, denies any diarrhea/constipation. He lost 20 olbs over 2 years.  Denies any family history of cancer He is ECOG of 1.  Patient was accompanied by his wife and daughter who is an NP.  [de-identified] : Yasmany

## 2024-08-12 NOTE — REVIEW OF SYSTEMS
[Abdominal Pain] : abdominal pain [Negative] : Heme/Lymph [FreeTextEntry7] : abdominal distention, early satiety [FreeTextEntry8] : urinary hesitancy, straining

## 2024-08-12 NOTE — HISTORY OF PRESENT ILLNESS
[de-identified] : 64 years old male with PMHx of HTN, DM, Arthritis who presents for the discussion about newly found hepatic mets.   Initially, patient had a colonoscopy in 03/2021, patient was found to have cecal polyp that could not be resected so he was referred to Dr. Garcia for the EMR of cecal polyp. In 08/2021, patient had colonoscopy in which patient was found to 7-8 cm sessile cecal polyp, that was "partially" removed via EMR, however, it was a long procedure and patient needed intubation, it was a difficult intubation, and the procedure was aborted with the plan to complete resection with a specialized "Pathfinder device". Patient was lost for follow up.  3 months ago, patient noticed abdominal distention, pain and early satiety. He had a CT abdomen done that revealed " Mild hepatomegaly, with various liver lesions. Largest lesion localized to right hepatic lobe measuring 8 cm in diameter. Alos, 8.8 cm segment of abnormal proximal colon which revealed marked circumferential wall thickening raising suspicion for annular constricting lesions. It also showed severe, asymmetric loculated ascites with evidence of carcinomatosis.   Patient reports early satiety, abdominal distention. He reports regular bowel movements, denies any diarrhea/constipation. He lost 20 olbs over 2 years.  Denies any family history of cancer He is ECOG of 1.  Patient was accompanied by his wife and daughter who is an NP.  [de-identified] : Yasmany

## 2024-08-12 NOTE — REASON FOR VISIT
[Initial Consultation] : an initial consultation [Spouse] : spouse [Family Member] : family member [FreeTextEntry2] : metastatic cancer

## 2024-08-12 NOTE — PHYSICAL EXAM
[Restricted in physically strenuous activity but ambulatory and able to carry out work of a light or sedentary nature] : Status 1- Restricted in physically strenuous activity but ambulatory and able to carry out work of a light or sedentary nature, e.g., light house work, office work [Obese] : obese [Normal] : full range of motion and no deformities appreciated [de-identified] : abdominal distention, tender to deep palpation

## 2024-08-12 NOTE — PHYSICAL EXAM
[Restricted in physically strenuous activity but ambulatory and able to carry out work of a light or sedentary nature] : Status 1- Restricted in physically strenuous activity but ambulatory and able to carry out work of a light or sedentary nature, e.g., light house work, office work [Obese] : obese [Normal] : full range of motion and no deformities appreciated [de-identified] : abdominal distention, tender to deep palpation

## 2024-08-13 DIAGNOSIS — C78.7 SECONDARY MALIGNANT NEOPLASM OF LIVER AND INTRAHEPATIC BILE DUCT: ICD-10-CM

## 2024-08-13 LAB
AFP-TM SERPL-MCNC: 2.8 NG/ML
CEA SERPL-MCNC: 374 NG/ML
FERRITIN SERPL-MCNC: 274 NG/ML

## 2024-08-14 PROBLEM — I10 ESSENTIAL (PRIMARY) HYPERTENSION: Chronic | Status: ACTIVE | Noted: 2024-08-13

## 2024-08-15 ENCOUNTER — RESULT REVIEW (OUTPATIENT)
Age: 64
End: 2024-08-15

## 2024-08-17 PROBLEM — I10 ESSENTIAL (PRIMARY) HYPERTENSION: Chronic | Status: INACTIVE | Noted: 2021-08-13 | Resolved: 2024-08-13

## 2024-08-19 ENCOUNTER — RESULT REVIEW (OUTPATIENT)
Age: 64
End: 2024-08-19

## 2024-08-20 ENCOUNTER — RESULT REVIEW (OUTPATIENT)
Age: 64
End: 2024-08-20

## 2024-08-20 ENCOUNTER — TRANSCRIPTION ENCOUNTER (OUTPATIENT)
Age: 64
End: 2024-08-20

## 2024-08-29 ENCOUNTER — APPOINTMENT (OUTPATIENT)
Age: 64
End: 2024-08-29
Payer: MEDICARE

## 2024-08-29 ENCOUNTER — OUTPATIENT (OUTPATIENT)
Dept: OUTPATIENT SERVICES | Facility: HOSPITAL | Age: 64
LOS: 1 days | End: 2024-08-29
Payer: MEDICARE

## 2024-08-29 VITALS
TEMPERATURE: 98.8 F | HEART RATE: 119 BPM | OXYGEN SATURATION: 98 % | SYSTOLIC BLOOD PRESSURE: 129 MMHG | DIASTOLIC BLOOD PRESSURE: 62 MMHG | BODY MASS INDEX: 36.3 KG/M2 | WEIGHT: 268 LBS | RESPIRATION RATE: 16 BRPM | HEIGHT: 72 IN

## 2024-08-29 DIAGNOSIS — C18.9 MALIGNANT NEOPLASM OF COLON, UNSPECIFIED: ICD-10-CM

## 2024-08-29 DIAGNOSIS — C78.7 SECONDARY MALIGNANT NEOPLASM OF LIVER AND INTRAHEPATIC BILE DUCT: ICD-10-CM

## 2024-08-29 PROCEDURE — 99214 OFFICE O/P EST MOD 30 MIN: CPT

## 2024-08-30 DIAGNOSIS — C18.9 MALIGNANT NEOPLASM OF COLON, UNSPECIFIED: ICD-10-CM

## 2024-08-30 RX ORDER — ONDANSETRON 8 MG/1
8 TABLET ORAL EVERY 8 HOURS
Qty: 30 | Refills: 3 | Status: ACTIVE | COMMUNITY
Start: 2024-08-30 | End: 1900-01-01

## 2024-08-30 RX ORDER — CAPECITABINE 500 MG/1
500 TABLET ORAL
Qty: 112 | Refills: 0 | Status: ACTIVE | COMMUNITY
Start: 2024-08-29 | End: 1900-01-01

## 2024-08-30 RX ORDER — PROCHLORPERAZINE MALEATE 10 MG/1
10 TABLET ORAL EVERY 6 HOURS
Qty: 30 | Refills: 3 | Status: ACTIVE | COMMUNITY
Start: 2024-08-30 | End: 1900-01-01

## 2024-08-30 RX ORDER — UREA 17 G/85G
20 CREAM TOPICAL TWICE DAILY
Qty: 1 | Refills: 1 | Status: ACTIVE | COMMUNITY
Start: 2024-08-30 | End: 1900-01-01

## 2024-09-03 NOTE — HISTORY OF PRESENT ILLNESS
[de-identified] : 8/29/2024 Patient returns for follow up , liver biopsy is consistent with metastatic colon cancer, He offers no new complaints today , no pain , hematochezia.  he has elevated CEA , POT shows disseminated disease. Hgb : 10.8 with normal ferritin .

## 2024-09-03 NOTE — HISTORY OF PRESENT ILLNESS
[de-identified] : 8/29/2024 Patient returns for follow up , liver biopsy is consistent with metastatic colon cancer, He offers no new complaints today , no pain , hematochezia.  he has elevated CEA , POT shows disseminated disease. Hgb : 10.8 with normal ferritin .

## 2024-09-03 NOTE — ASSESSMENT
[FreeTextEntry1] : 64 year old male with metastatic colon cancer, ECOG :1  mild anemia A long discussion ensued with the patient and family , he is recommended systemic chemotherapy with GENET . We discussed the different options , namely FOLFOX or CAPOX . he was apprised of potential adverse effects including and not limited to fever , infection , anemia requiring transfusions .  nausea , emesis, fatigue , mucositis , diarrhea , neuropathy , cold sensitivity , infusion reaction ,catheter related complications , thrombosis , bleeding with avastin , hypertension , proteinuria, altered weight \ rare cardiac toxicity  He decided to proceed with CAPOX and GENET , dose reduction for first cycle /  All questions and concerns addressed

## 2024-09-09 ENCOUNTER — TRANSCRIPTION ENCOUNTER (OUTPATIENT)
Age: 64
End: 2024-09-09

## 2024-09-09 ENCOUNTER — RESULT REVIEW (OUTPATIENT)
Age: 64
End: 2024-09-09

## 2024-09-09 ENCOUNTER — OUTPATIENT (OUTPATIENT)
Dept: OUTPATIENT SERVICES | Facility: HOSPITAL | Age: 64
LOS: 1 days | Discharge: HOME | End: 2024-09-09
Payer: MEDICARE

## 2024-09-09 VITALS
SYSTOLIC BLOOD PRESSURE: 128 MMHG | TEMPERATURE: 99 F | DIASTOLIC BLOOD PRESSURE: 74 MMHG | RESPIRATION RATE: 16 BRPM | OXYGEN SATURATION: 95 % | HEART RATE: 104 BPM

## 2024-09-09 VITALS
HEART RATE: 98 BPM | DIASTOLIC BLOOD PRESSURE: 69 MMHG | OXYGEN SATURATION: 97 % | RESPIRATION RATE: 16 BRPM | SYSTOLIC BLOOD PRESSURE: 123 MMHG

## 2024-09-09 DIAGNOSIS — C18.9 MALIGNANT NEOPLASM OF COLON, UNSPECIFIED: ICD-10-CM

## 2024-09-09 DIAGNOSIS — R18.8 OTHER ASCITES: ICD-10-CM

## 2024-09-09 LAB
B PERT IGG+IGM PNL SER: ABNORMAL
COLOR FLD: YELLOW — SIGNIFICANT CHANGE UP
FLUID INTAKE SUBSTANCE CLASS: SIGNIFICANT CHANGE UP
LYMPHOCYTES # FLD: 46 — SIGNIFICANT CHANGE UP
MONOS+MACROS # FLD: 50 % — SIGNIFICANT CHANGE UP
NEUTROPHILS-BODY FLUID: 4 % — SIGNIFICANT CHANGE UP
RCV VOL RI: 8000 /UL — HIGH (ref 0–0)
TOTAL NUCLEATED CELL COUNT, BODY FLUID: 517 /UL — SIGNIFICANT CHANGE UP
TUBE TYPE: SIGNIFICANT CHANGE UP

## 2024-09-09 PROCEDURE — 49083 ABD PARACENTESIS W/IMAGING: CPT

## 2024-09-09 PROCEDURE — 36573 INSJ PICC RS&I 5 YR+: CPT

## 2024-09-09 PROCEDURE — 87205 SMEAR GRAM STAIN: CPT

## 2024-09-09 PROCEDURE — 82042 OTHER SOURCE ALBUMIN QUAN EA: CPT

## 2024-09-09 PROCEDURE — 87102 FUNGUS ISOLATION CULTURE: CPT

## 2024-09-09 PROCEDURE — 87075 CULTR BACTERIA EXCEPT BLOOD: CPT

## 2024-09-09 PROCEDURE — 84157 ASSAY OF PROTEIN OTHER: CPT

## 2024-09-09 PROCEDURE — 87015 SPECIMEN INFECT AGNT CONCNTJ: CPT

## 2024-09-09 PROCEDURE — C1751: CPT

## 2024-09-09 PROCEDURE — 83615 LACTATE (LD) (LDH) ENZYME: CPT

## 2024-09-09 PROCEDURE — 87070 CULTURE OTHR SPECIMN AEROBIC: CPT

## 2024-09-09 PROCEDURE — 89051 BODY FLUID CELL COUNT: CPT

## 2024-09-09 PROCEDURE — 82945 GLUCOSE OTHER FLUID: CPT

## 2024-09-09 RX ORDER — ALBUMIN (HUMAN) 5 G/20ML
50 SOLUTION INTRAVENOUS
Refills: 0 | Status: DISCONTINUED | OUTPATIENT
Start: 2024-09-09 | End: 2024-09-09

## 2024-09-09 NOTE — PROGRESS NOTE ADULT - SUBJECTIVE AND OBJECTIVE BOX
Interventional Radiology Brief- Operative Note: Paracentesis    Procedure: image guided paracentesis     Pre-Op Diagnosis: colon ca with liver mets and recurrent ascites     Post-Op Diagnosis: same     Provider: Hamida Coughlin PA-C    Anesthesia (type):  [ ] General Anesthesia  [ ] Sedation  [ ] Spinal Anesthesia  [ x ] Local/Regional    Contrast: none    Estimated Blood Loss: <5mL    Condition:   [ ] Critical  [ ] Serious  [ ] Fair   [ x ] Good    Findings/Follow up Plan of Care: 7.5 liters serosanginous fluid removed, 25% albumin in 50cc solution to be given x 3    Specimens Removed: 60cc sent to lab    Implants: none     Complications: none     Condition/Disposition: d/c home once albumin infusion completed    Discharge instructions: resume diet and activity as tolerated, resume medications as needed, keep dressing clean and dry - remove after 2 days, follow up paracentesis as needed    To contact Interventional Radiology with any questions, concerns or issues please utilize the following:  M-F 7am-5pm: [id number] on teams,  consult spectra: [phone]  After hours/weekends/holidays: [phone]

## 2024-09-09 NOTE — PRE PROCEDURE NOTE - PRE PROCEDURE EVALUATION
63yo M PMH HTN, DM, arthritis, colon cancer with new found hepatic mets s/p liver mass biopsy and paracentesis 8/20 presents today for repeat paracentesis due to reaccumulation and PICC line placement for chemotherapy treatment.    Plan for image guided paracentesis and PICC line placement today 9/9

## 2024-09-09 NOTE — ASU DISCHARGE PLAN (ADULT/PEDIATRIC) - NS MD DC FALL RISK RISK
For information on Fall & Injury Prevention, visit: [email or web address] OR  [email or web address] OR  https://www.cdc.gov/steadi/patient.html

## 2024-09-09 NOTE — PROCEDURE NOTE - ADDITIONAL PROCEDURE DETAILS
Image-guided placement of 5fr single lumen 45cm BARD powerPICC in left brachial vein. Tip at cavoatrial junction. Flushes and aspirates well. Okay to use immediately.

## 2024-09-10 ENCOUNTER — NON-APPOINTMENT (OUTPATIENT)
Age: 64
End: 2024-09-10

## 2024-09-10 LAB
GRAM STN FLD: SIGNIFICANT CHANGE UP
SPECIMEN SOURCE: SIGNIFICANT CHANGE UP

## 2024-09-11 ENCOUNTER — APPOINTMENT (OUTPATIENT)
Age: 64
End: 2024-09-11

## 2024-09-11 LAB
ALBUMIN FLD-MCNC: 1.2 G/DL — SIGNIFICANT CHANGE UP
GLUCOSE FLD-MCNC: 42 MG/DL — SIGNIFICANT CHANGE UP
LDH SERPL L TO P-CCNC: 241 U/L — SIGNIFICANT CHANGE UP
PROT FLD-MCNC: 4.8 G/DL — SIGNIFICANT CHANGE UP

## 2024-09-13 ENCOUNTER — APPOINTMENT (OUTPATIENT)
Age: 64
End: 2024-09-13

## 2024-09-14 LAB
CULTURE RESULTS: SIGNIFICANT CHANGE UP
SPECIMEN SOURCE: SIGNIFICANT CHANGE UP

## 2024-09-16 ENCOUNTER — APPOINTMENT (OUTPATIENT)
Age: 64
End: 2024-09-16

## 2024-09-19 ENCOUNTER — NON-APPOINTMENT (OUTPATIENT)
Age: 64
End: 2024-09-19

## 2024-09-23 ENCOUNTER — NON-APPOINTMENT (OUTPATIENT)
Age: 64
End: 2024-09-23

## 2024-09-23 ENCOUNTER — LABORATORY RESULT (OUTPATIENT)
Age: 64
End: 2024-09-23

## 2024-09-23 ENCOUNTER — OUTPATIENT (OUTPATIENT)
Dept: OUTPATIENT SERVICES | Facility: HOSPITAL | Age: 64
LOS: 1 days | End: 2024-09-23
Payer: MEDICARE

## 2024-09-23 ENCOUNTER — APPOINTMENT (OUTPATIENT)
Age: 64
End: 2024-09-23

## 2024-09-23 VITALS
OXYGEN SATURATION: 90 % | HEART RATE: 127 BPM | DIASTOLIC BLOOD PRESSURE: 76 MMHG | RESPIRATION RATE: 20 BRPM | TEMPERATURE: 97.2 F | SYSTOLIC BLOOD PRESSURE: 138 MMHG

## 2024-09-23 VITALS
TEMPERATURE: 97.7 F | RESPIRATION RATE: 16 BRPM | HEART RATE: 62 BPM | DIASTOLIC BLOOD PRESSURE: 72 MMHG | SYSTOLIC BLOOD PRESSURE: 116 MMHG

## 2024-09-23 VITALS
SYSTOLIC BLOOD PRESSURE: 162 MMHG | RESPIRATION RATE: 18 BRPM | HEART RATE: 135 BPM | DIASTOLIC BLOOD PRESSURE: 78 MMHG | TEMPERATURE: 98.1 F

## 2024-09-23 VITALS
DIASTOLIC BLOOD PRESSURE: 71 MMHG | HEART RATE: 118 BPM | RESPIRATION RATE: 16 BRPM | OXYGEN SATURATION: 100 % | TEMPERATURE: 97.5 F | SYSTOLIC BLOOD PRESSURE: 114 MMHG

## 2024-09-23 DIAGNOSIS — C18.9 MALIGNANT NEOPLASM OF COLON, UNSPECIFIED: ICD-10-CM

## 2024-09-23 PROCEDURE — 81001 URINALYSIS AUTO W/SCOPE: CPT

## 2024-09-23 PROCEDURE — 96415 CHEMO IV INFUSION ADDL HR: CPT

## 2024-09-23 PROCEDURE — 80076 HEPATIC FUNCTION PANEL: CPT

## 2024-09-23 PROCEDURE — 96417 CHEMO IV INFUS EACH ADDL SEQ: CPT

## 2024-09-23 PROCEDURE — 96413 CHEMO IV INFUSION 1 HR: CPT

## 2024-09-23 PROCEDURE — 80051 ELECTROLYTE PANEL: CPT

## 2024-09-23 PROCEDURE — 96367 TX/PROPH/DG ADDL SEQ IV INF: CPT

## 2024-09-23 PROCEDURE — 96375 TX/PRO/DX INJ NEW DRUG ADDON: CPT

## 2024-09-23 PROCEDURE — 85027 COMPLETE CBC AUTOMATED: CPT

## 2024-09-23 RX ORDER — DIPHENHYDRAMINE HCL 25 MG
25 CAPSULE ORAL ONCE
Refills: 0 | Status: COMPLETED | OUTPATIENT
Start: 2024-09-23 | End: 2024-09-23

## 2024-09-23 RX ORDER — OXALIPLATIN 5 MG/ML
250 INJECTION, SOLUTION, CONCENTRATE INTRAVENOUS ONCE
Refills: 0 | Status: COMPLETED | OUTPATIENT
Start: 2024-09-23 | End: 2024-09-23

## 2024-09-23 RX ORDER — ONDANSETRON HYDROCHLORIDE 4 MG/1
16 TABLET, FILM COATED ORAL ONCE
Refills: 0 | Status: COMPLETED | OUTPATIENT
Start: 2024-09-23 | End: 2024-09-23

## 2024-09-23 RX ORDER — DEXAMETHASONE 1.5 MG/1
12 TABLET ORAL ONCE
Refills: 0 | Status: COMPLETED | OUTPATIENT
Start: 2024-09-23 | End: 2024-09-23

## 2024-09-23 RX ORDER — FOSAPREPITANT 150 MG/5ML
150 INJECTION, POWDER, LYOPHILIZED, FOR SOLUTION INTRAVENOUS ONCE
Refills: 0 | Status: COMPLETED | OUTPATIENT
Start: 2024-09-23 | End: 2024-09-23

## 2024-09-23 RX ORDER — BEVACIZUMAB-BVZR 400 MG/16ML
900 INJECTION, SOLUTION INTRAVENOUS ONCE
Refills: 0 | Status: COMPLETED | OUTPATIENT
Start: 2024-09-23 | End: 2024-09-23

## 2024-09-23 RX ORDER — ACETAMINOPHEN 500MG 500 MG/1
650 TABLET, COATED ORAL ONCE
Refills: 0 | Status: COMPLETED | OUTPATIENT
Start: 2024-09-23 | End: 2024-09-23

## 2024-09-23 RX ORDER — DRONABINOL 2.5 MG/1
2.5 CAPSULE ORAL TWICE DAILY
Qty: 60 | Refills: 0 | Status: ACTIVE | COMMUNITY
Start: 2024-09-23 | End: 1900-01-01

## 2024-09-23 RX ADMIN — BEVACIZUMAB-BVZR 900 MILLIGRAM(S): 400 INJECTION, SOLUTION INTRAVENOUS at 13:42

## 2024-09-23 RX ADMIN — DEXAMETHASONE 106 MILLIGRAM(S): 1.5 TABLET ORAL at 13:19

## 2024-09-23 RX ADMIN — ACETAMINOPHEN 500MG 650 MILLIGRAM(S): 500 TABLET, COATED ORAL at 13:20

## 2024-09-23 RX ADMIN — Medication 101 MILLIGRAM(S): at 13:19

## 2024-09-23 RX ADMIN — OXALIPLATIN 250 MILLIGRAM(S): 5 INJECTION, SOLUTION, CONCENTRATE INTRAVENOUS at 13:42

## 2024-09-23 RX ADMIN — FOSAPREPITANT 500 MILLIGRAM(S): 150 INJECTION, POWDER, LYOPHILIZED, FOR SOLUTION INTRAVENOUS at 13:19

## 2024-09-23 RX ADMIN — ONDANSETRON HYDROCHLORIDE 116 MILLIGRAM(S): 4 TABLET, FILM COATED ORAL at 13:19

## 2024-09-24 ENCOUNTER — NON-APPOINTMENT (OUTPATIENT)
Age: 64
End: 2024-09-24

## 2024-09-24 DIAGNOSIS — C18.9 MALIGNANT NEOPLASM OF COLON, UNSPECIFIED: ICD-10-CM

## 2024-09-25 LAB
ALBUMIN SERPL ELPH-MCNC: 3.1 G/DL
ALP BLD-CCNC: 475 U/L
ALT SERPL-CCNC: 22 U/L
ANION GAP SERPL CALC-SCNC: 18 MMOL/L
APPEARANCE: ABNORMAL
AST SERPL-CCNC: 55 U/L
BILIRUB DIRECT SERPL-MCNC: 0.4 MG/DL
BILIRUB INDIRECT SERPL-MCNC: 0.2 MG/DL
BILIRUB SERPL-MCNC: 0.6 MG/DL
BILIRUBIN URINE: NEGATIVE
BLOOD URINE: ABNORMAL
CHLORIDE SERPL-SCNC: 87 MMOL/L
CO2 SERPL-SCNC: 27 MMOL/L
COLOR: NORMAL
GLUCOSE QUALITATIVE U: NEGATIVE MG/DL
HCT VFR BLD CALC: 28.5 %
HGB BLD-MCNC: 9.9 G/DL
KETONES URINE: ABNORMAL MG/DL
LEUKOCYTE ESTERASE URINE: ABNORMAL
MCHC RBC-ENTMCNC: 27.3 PG
MCHC RBC-ENTMCNC: 34.7 G/DL
MCV RBC AUTO: 78.7 FL
NITRITE URINE: NEGATIVE
PH URINE: 5.5
PLATELET # BLD AUTO: 462 K/UL
PMV BLD: 9.1 FL
POTASSIUM SERPL-SCNC: 3.1 MMOL/L
PROT SERPL-MCNC: 7.2 G/DL
PROTEIN URINE: 30 MG/DL
RBC # BLD: 3.62 M/UL
RBC # FLD: 16 %
SODIUM SERPL-SCNC: 132 MMOL/L
SPECIFIC GRAVITY URINE: 1.02
UROBILINOGEN URINE: 1 MG/DL
WBC # FLD AUTO: 13.85 K/UL

## 2024-09-25 RX ORDER — CIPROFLOXACIN HYDROCHLORIDE 500 MG/1
500 TABLET, FILM COATED ORAL
Qty: 6 | Refills: 0 | Status: ACTIVE | COMMUNITY
Start: 2024-09-25 | End: 1900-01-01

## 2024-09-29 ENCOUNTER — INPATIENT (INPATIENT)
Facility: HOSPITAL | Age: 64
LOS: 4 days | Discharge: HOME | DRG: 812 | End: 2024-10-04
Attending: INTERNAL MEDICINE | Admitting: STUDENT IN AN ORGANIZED HEALTH CARE EDUCATION/TRAINING PROGRAM
Payer: MEDICARE

## 2024-09-29 VITALS
WEIGHT: 265 LBS | TEMPERATURE: 101 F | OXYGEN SATURATION: 99 % | DIASTOLIC BLOOD PRESSURE: 77 MMHG | SYSTOLIC BLOOD PRESSURE: 134 MMHG | HEIGHT: 72 IN | RESPIRATION RATE: 19 BRPM | HEART RATE: 120 BPM

## 2024-09-29 DIAGNOSIS — I10 ESSENTIAL (PRIMARY) HYPERTENSION: ICD-10-CM

## 2024-09-29 DIAGNOSIS — J90 PLEURAL EFFUSION, NOT ELSEWHERE CLASSIFIED: ICD-10-CM

## 2024-09-29 DIAGNOSIS — C78.6 SECONDARY MALIGNANT NEOPLASM OF RETROPERITONEUM AND PERITONEUM: ICD-10-CM

## 2024-09-29 DIAGNOSIS — C78.00 SECONDARY MALIGNANT NEOPLASM OF UNSPECIFIED LUNG: ICD-10-CM

## 2024-09-29 DIAGNOSIS — E43 UNSPECIFIED SEVERE PROTEIN-CALORIE MALNUTRITION: ICD-10-CM

## 2024-09-29 DIAGNOSIS — R18.8 OTHER ASCITES: ICD-10-CM

## 2024-09-29 DIAGNOSIS — D64.9 ANEMIA, UNSPECIFIED: ICD-10-CM

## 2024-09-29 DIAGNOSIS — C18.9 MALIGNANT NEOPLASM OF COLON, UNSPECIFIED: ICD-10-CM

## 2024-09-29 DIAGNOSIS — R50.2 DRUG INDUCED FEVER: ICD-10-CM

## 2024-09-29 DIAGNOSIS — D84.81 IMMUNODEFICIENCY DUE TO CONDITIONS CLASSIFIED ELSEWHERE: ICD-10-CM

## 2024-09-29 DIAGNOSIS — E78.5 HYPERLIPIDEMIA, UNSPECIFIED: ICD-10-CM

## 2024-09-29 DIAGNOSIS — E11.9 TYPE 2 DIABETES MELLITUS WITHOUT COMPLICATIONS: ICD-10-CM

## 2024-09-29 DIAGNOSIS — E66.9 OBESITY, UNSPECIFIED: ICD-10-CM

## 2024-09-29 DIAGNOSIS — Z79.84 LONG TERM (CURRENT) USE OF ORAL HYPOGLYCEMIC DRUGS: ICD-10-CM

## 2024-09-29 DIAGNOSIS — Z79.82 LONG TERM (CURRENT) USE OF ASPIRIN: ICD-10-CM

## 2024-09-29 DIAGNOSIS — K59.00 CONSTIPATION, UNSPECIFIED: ICD-10-CM

## 2024-09-29 DIAGNOSIS — T45.1X5A ADVERSE EFFECT OF ANTINEOPLASTIC AND IMMUNOSUPPRESSIVE DRUGS, INITIAL ENCOUNTER: ICD-10-CM

## 2024-09-29 DIAGNOSIS — J98.11 ATELECTASIS: ICD-10-CM

## 2024-09-29 DIAGNOSIS — R65.10 SYSTEMIC INFLAMMATORY RESPONSE SYNDROME (SIRS) OF NON-INFECTIOUS ORIGIN WITHOUT ACUTE ORGAN DYSFUNCTION: ICD-10-CM

## 2024-09-29 DIAGNOSIS — E87.6 HYPOKALEMIA: ICD-10-CM

## 2024-09-29 DIAGNOSIS — C78.7 SECONDARY MALIGNANT NEOPLASM OF LIVER AND INTRAHEPATIC BILE DUCT: ICD-10-CM
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
## 2024-09-29 LAB
ALBUMIN SERPL ELPH-MCNC: 3.7 G/DL — SIGNIFICANT CHANGE UP (ref 3.5–5.2)
ALP SERPL-CCNC: 384 U/L — HIGH (ref 30–115)
ALT FLD-CCNC: 29 U/L — SIGNIFICANT CHANGE UP (ref 0–41)
ANION GAP SERPL CALC-SCNC: 14 MMOL/L — SIGNIFICANT CHANGE UP (ref 7–14)
APTT BLD: 26.7 SEC — LOW (ref 27–39.2)
AST SERPL-CCNC: 66 U/L — HIGH (ref 0–41)
BASOPHILS # BLD AUTO: 0 K/UL — SIGNIFICANT CHANGE UP (ref 0–0.2)
BASOPHILS NFR BLD AUTO: 0 % — SIGNIFICANT CHANGE UP (ref 0–1)
BILIRUB SERPL-MCNC: 0.6 MG/DL — SIGNIFICANT CHANGE UP (ref 0.2–1.2)
BUN SERPL-MCNC: 19 MG/DL — SIGNIFICANT CHANGE UP (ref 10–20)
CALCIUM SERPL-MCNC: 9 MG/DL — SIGNIFICANT CHANGE UP (ref 8.4–10.5)
CHLORIDE SERPL-SCNC: 93 MMOL/L — LOW (ref 98–110)
CO2 SERPL-SCNC: 30 MMOL/L — SIGNIFICANT CHANGE UP (ref 17–32)
CREAT SERPL-MCNC: 0.9 MG/DL — SIGNIFICANT CHANGE UP (ref 0.7–1.5)
EGFR: 95 ML/MIN/1.73M2 — SIGNIFICANT CHANGE UP
EOSINOPHIL # BLD AUTO: 0 K/UL — SIGNIFICANT CHANGE UP (ref 0–0.7)
EOSINOPHIL NFR BLD AUTO: 0 % — SIGNIFICANT CHANGE UP (ref 0–8)
FLUAV AG NPH QL: SIGNIFICANT CHANGE UP
FLUBV AG NPH QL: SIGNIFICANT CHANGE UP
GLUCOSE SERPL-MCNC: 91 MG/DL — SIGNIFICANT CHANGE UP (ref 70–99)
HCT VFR BLD CALC: 27.9 % — LOW (ref 42–52)
HGB BLD-MCNC: 9.2 G/DL — LOW (ref 14–18)
INR BLD: 1.32 RATIO — HIGH (ref 0.65–1.3)
LYMPHOCYTES # BLD AUTO: 0.42 K/UL — LOW (ref 1.2–3.4)
LYMPHOCYTES # BLD AUTO: 4 % — LOW (ref 20.5–51.1)
MCHC RBC-ENTMCNC: 26.9 PG — LOW (ref 27–31)
MCHC RBC-ENTMCNC: 33 G/DL — SIGNIFICANT CHANGE UP (ref 32–37)
MCV RBC AUTO: 81.6 FL — SIGNIFICANT CHANGE UP (ref 80–94)
MONOCYTES # BLD AUTO: 0.32 K/UL — SIGNIFICANT CHANGE UP (ref 0.1–0.6)
MONOCYTES NFR BLD AUTO: 3 % — SIGNIFICANT CHANGE UP (ref 1.7–9.3)
NEUTROPHILS # BLD AUTO: 9.8 K/UL — HIGH (ref 1.4–6.5)
NEUTROPHILS NFR BLD AUTO: 93 % — HIGH (ref 42.2–75.2)
NRBC # BLD: SIGNIFICANT CHANGE UP /100 WBCS (ref 0–0)
PLATELET # BLD AUTO: 410 K/UL — HIGH (ref 130–400)
PMV BLD: 10.9 FL — HIGH (ref 7.4–10.4)
POTASSIUM SERPL-MCNC: 3.3 MMOL/L — LOW (ref 3.5–5)
POTASSIUM SERPL-SCNC: 3.3 MMOL/L — LOW (ref 3.5–5)
PROT SERPL-MCNC: 7 G/DL — SIGNIFICANT CHANGE UP (ref 6–8)
PROTHROM AB SERPL-ACNC: 15.1 SEC — HIGH (ref 9.95–12.87)
RBC # BLD: 3.42 M/UL — LOW (ref 4.7–6.1)
RBC # FLD: 16.5 % — HIGH (ref 11.5–14.5)
RSV RNA NPH QL NAA+NON-PROBE: SIGNIFICANT CHANGE UP
SARS-COV-2 RNA SPEC QL NAA+PROBE: SIGNIFICANT CHANGE UP
SODIUM SERPL-SCNC: 137 MMOL/L — SIGNIFICANT CHANGE UP (ref 135–146)
WBC # BLD: 10.54 K/UL — SIGNIFICANT CHANGE UP (ref 4.8–10.8)
WBC # FLD AUTO: 10.54 K/UL — SIGNIFICANT CHANGE UP (ref 4.8–10.8)

## 2024-09-29 PROCEDURE — 99285 EMERGENCY DEPT VISIT HI MDM: CPT

## 2024-09-29 PROCEDURE — 71045 X-RAY EXAM CHEST 1 VIEW: CPT | Mod: 26

## 2024-09-29 RX ORDER — CEFEPIME 2 G/1
2000 INJECTION, POWDER, FOR SOLUTION INTRAVENOUS ONCE
Refills: 0 | Status: COMPLETED | OUTPATIENT
Start: 2024-09-29 | End: 2024-09-29

## 2024-09-29 RX ORDER — ACETAMINOPHEN 325 MG
975 TABLET ORAL ONCE
Refills: 0 | Status: COMPLETED | OUTPATIENT
Start: 2024-09-29 | End: 2024-09-29

## 2024-09-29 RX ORDER — VANCOMYCIN HCL-SODIUM CHLORIDE IV SOLN 1.5 GM/250ML-0.9% 1.5-0.9/25 GM/ML-%
1000 SOLUTION INTRAVENOUS ONCE
Refills: 0 | Status: COMPLETED | OUTPATIENT
Start: 2024-09-29 | End: 2024-09-29

## 2024-09-29 RX ORDER — SODIUM CHLORIDE IRRIG SOLUTION 0.9 %
2400 SOLUTION, IRRIGATION IRRIGATION ONCE
Refills: 0 | Status: COMPLETED | OUTPATIENT
Start: 2024-09-29 | End: 2024-09-29

## 2024-09-29 RX ADMIN — Medication 975 MILLIGRAM(S): at 22:22

## 2024-09-29 RX ADMIN — CEFEPIME 100 MILLIGRAM(S): 2 INJECTION, POWDER, FOR SOLUTION INTRAVENOUS at 22:23

## 2024-09-29 RX ADMIN — Medication 2400 MILLILITER(S): at 22:22

## 2024-09-29 RX ADMIN — Medication 975 MILLIGRAM(S): at 23:34

## 2024-09-29 RX ADMIN — VANCOMYCIN HCL-SODIUM CHLORIDE IV SOLN 1.5 GM/250ML-0.9% 250 MILLIGRAM(S): 1.5-0.9/25 SOLUTION at 23:48

## 2024-09-29 NOTE — ED PROVIDER NOTE - ATTENDING CONTRIBUTION TO CARE
63 yo M with hx of colon cancer on chemo, ascites, HTN, HLD, DM who presents with fever and chills today. For past 2-3 days has been having cough and loss of taste. No cp, sob, dysuria, diarrhea. No sick contact. Pt says he has intermittent abd pain which is chronic and unchanged from baseline. Last paracentesis was done by IR on 9/9. Says abd pain and distension are not worse than usual.    PMD Dr. Mavis Vallecillo/onc Dr. Patel    CONSTITUTIONAL: well developed, nontoxic appearing, in no acute distress, speaking in full sentences  SKIN: warm, dry, no rash, cap refill < 2 seconds  HEENT: normocephalic, atraumatic, no conjunctival erythema, moist mucous membranes, patent airway  NECK: supple  CV:  tachycardic rate, regular rhythm, 2+ radial pulses bilaterally  RESP: no wheezes, no rales, no rhonchi, normal work of breathing  ABD: soft, no tenderness, moderately distended, no rebound, no guarding  MSK: normal ROM, no cyanosis, no edema  NEURO: alert, oriented, grossly unremarkable  PSYCH: cooperative, appropriate    A&P:  Pt here with URI sx in setting of active chemo. Here in ED, febrile 101.3, HR 120s. Nontoxic appearing. Sepsis suspected at 21:23 Given 30cc/kg IVF of ideal body weight. Ordered cefepime and vanc for broad spectrum coverage given immunocompromised. Has abd distension but no tenderness or change in abd distension/intermittent pain concerning for SBP. Plan for labs, ekg, imaging r/o sepsis, viral URI, pneumonia, electrolyte derangement.

## 2024-09-29 NOTE — ED ADULT NURSE NOTE - NSFALLRISKINTERV_ED_ALL_ED

## 2024-09-29 NOTE — ED PROVIDER NOTE - PROGRESS NOTE DETAILS
Patient febrile to 101.3 and tachycardic in triage. Code sepsis initiated, cultures, fluids and abx ordered. Charge RN aware

## 2024-09-29 NOTE — ED PROVIDER NOTE - OBJECTIVE STATEMENT
64-year-old male PMH stage IV colon cancer on chemotherapy (follows with Dr. Patel), HTN, HLD and DM presents to the ED complaining of fever, weakness and chills that started about 1 hour prior to arrival patient also reports 2 to 3 days of cough and intermittent sore throat, which was worse last night.  Denies chest pain or shortness of breath.  Denies any new abdominal pain, nausea, vomiting, diarrhea, urinary symptoms or black/bloody stools.  No known sick contacts.

## 2024-09-29 NOTE — ED PROVIDER NOTE - CARE PLAN
Principal Discharge DX:	Sepsis  Secondary Diagnosis:	Anemia, mild  Secondary Diagnosis:	Bilateral pleural effusion  Secondary Diagnosis:	Immunocompromised patient   1 Principal Discharge DX:	Sepsis  Secondary Diagnosis:	Anemia, mild  Secondary Diagnosis:	Bilateral pleural effusion  Secondary Diagnosis:	Immunocompromised patient  Secondary Diagnosis:	Hypokalemia

## 2024-09-29 NOTE — ED PROVIDER NOTE - WR ORDER STATUS 1
Goal Outcome Evaluation:   Pt is dependent at baseline, requiring 24/7 care from family. Pt family reporting interest for HHPT to improve her strength/ROM, will recommend that upon d/c. Staff can use mabel lift for transfers while admitted, but no skilled therapy is indicated while admitted. PT will sign off.                  Anticipated Discharge Disposition (PT): home with home health                         Performed

## 2024-09-29 NOTE — ED ADULT NURSE NOTE - NSICDXPASTMEDICALHX_GEN_ALL_CORE_FT
PAST MEDICAL HISTORY:  Ascites     Colon cancer     DM (diabetes mellitus)     High cholesterol     HTN (hypertension)     Obesity

## 2024-09-29 NOTE — ED ADULT NURSE NOTE - NS_SISCREENINGSR_GEN_ALL_ED
----- Message from Ceci Garcia MA sent at 7/13/2021  2:31 PM EDT -----  Hub please inform patients, the results of    The Dexa scan shows osteoporosis, slightly worse compared to last time.  Are you okay with starting the Prolia now?    I called and LV for the patient to return the phone call to see if she would like to start Prolia?   Negative

## 2024-09-29 NOTE — ED PROVIDER NOTE - PHYSICAL EXAMINATION
VITAL SIGNS: I have reviewed nursing notes and confirm.  CONSTITUTIONAL: well-appearing, non-toxic, NAD  SKIN: Warm dry,  HEAD: NCAT  EYES: EOMI, PERRL  ENT: Moist mucous membranes, normal pharynx with no erythema or exudates  NECK: Supple; non tender.   CARD: Tachycardic  RESP: clear to ausculation b/l.  No rales, rhonchi, or wheezing.  ABD: soft, mildly TTP, distended, no rebound or guarding.  EXT: Full ROM, no bony tenderness, no calf tenderness  NEURO: Grossly intact.  PSYCH: Cooperative, appropriate. VITAL SIGNS: I have reviewed nursing notes and confirm.  CONSTITUTIONAL: well-appearing, non-toxic, NAD  SKIN: Warm dry,  HEAD: NCAT  EYES: EOMI, PERRL  ENT: Moist mucous membranes, normal pharynx with no erythema or exudates  NECK: Supple; non tender.   CARD: Tachycardic  RESP: clear to ausculation b/l.  No rales, rhonchi, or wheezing.  ABD: soft, non-tender, distended, no rebound or guarding.  EXT: Full ROM, no bony tenderness, no calf tenderness  NEURO: Grossly intact.  PSYCH: Cooperative, appropriate.

## 2024-09-29 NOTE — ED PROVIDER NOTE - CLINICAL SUMMARY MEDICAL DECISION MAKING FREE TEXT BOX
Pt here with URI sx in setting of active chemo. Here in ED, febrile 101.3, HR 120s. Nontoxic appearing. Sepsis suspected at 21:23 Given 30cc/kg IVF of ideal body weight. Ordered cefepime and vanc for broad spectrum coverage given immunocompromised. Has abd distension but no tenderness or change in abd distension/intermittent pain concerning for SBP. Plan for labs, ekg, imaging r/o sepsis, viral URI, pneumonia, electrolyte derangement. Labs notable for stable mild anemia, mild hypoK s/p repletion. RVP negative. Cxr with L pleural effusion, tiny R pleural effusion. No respiratory distress requiring emergent thoracentesis. Urine pending. Pt requires admission for IV abx given risk for worsening sepsis, bacteremia, septic shock, etc if not closely monitored and tx'ed.

## 2024-09-30 DIAGNOSIS — D64.9 ANEMIA, UNSPECIFIED: ICD-10-CM

## 2024-09-30 LAB
A1C WITH ESTIMATED AVERAGE GLUCOSE RESULT: 5.9 % — HIGH (ref 4–5.6)
ALBUMIN SERPL ELPH-MCNC: 3.5 G/DL — SIGNIFICANT CHANGE UP (ref 3.5–5.2)
ALP SERPL-CCNC: 372 U/L — HIGH (ref 30–115)
ALT FLD-CCNC: 28 U/L — SIGNIFICANT CHANGE UP (ref 0–41)
ANION GAP SERPL CALC-SCNC: 13 MMOL/L — SIGNIFICANT CHANGE UP (ref 7–14)
APPEARANCE UR: CLEAR — SIGNIFICANT CHANGE UP
AST SERPL-CCNC: 65 U/L — HIGH (ref 0–41)
BASOPHILS # BLD AUTO: 0.02 K/UL — SIGNIFICANT CHANGE UP (ref 0–0.2)
BASOPHILS NFR BLD AUTO: 0.2 % — SIGNIFICANT CHANGE UP (ref 0–1)
BILIRUB SERPL-MCNC: 0.6 MG/DL — SIGNIFICANT CHANGE UP (ref 0.2–1.2)
BILIRUB UR-MCNC: NEGATIVE — SIGNIFICANT CHANGE UP
BUN SERPL-MCNC: 15 MG/DL — SIGNIFICANT CHANGE UP (ref 10–20)
CALCIUM SERPL-MCNC: 8.9 MG/DL — SIGNIFICANT CHANGE UP (ref 8.4–10.5)
CHLORIDE SERPL-SCNC: 93 MMOL/L — LOW (ref 98–110)
CO2 SERPL-SCNC: 30 MMOL/L — SIGNIFICANT CHANGE UP (ref 17–32)
COLOR SPEC: SIGNIFICANT CHANGE UP
CREAT SERPL-MCNC: 0.8 MG/DL — SIGNIFICANT CHANGE UP (ref 0.7–1.5)
DIFF PNL FLD: NEGATIVE — SIGNIFICANT CHANGE UP
EGFR: 98 ML/MIN/1.73M2 — SIGNIFICANT CHANGE UP
EOSINOPHIL # BLD AUTO: 0.03 K/UL — SIGNIFICANT CHANGE UP (ref 0–0.7)
EOSINOPHIL NFR BLD AUTO: 0.3 % — SIGNIFICANT CHANGE UP (ref 0–8)
ESTIMATED AVERAGE GLUCOSE: 123 MG/DL — HIGH (ref 68–114)
GLUCOSE BLDC GLUCOMTR-MCNC: 111 MG/DL — HIGH (ref 70–99)
GLUCOSE BLDC GLUCOMTR-MCNC: 117 MG/DL — HIGH (ref 70–99)
GLUCOSE BLDC GLUCOMTR-MCNC: 120 MG/DL — HIGH (ref 70–99)
GLUCOSE BLDC GLUCOMTR-MCNC: 94 MG/DL — SIGNIFICANT CHANGE UP (ref 70–99)
GLUCOSE SERPL-MCNC: 83 MG/DL — SIGNIFICANT CHANGE UP (ref 70–99)
GLUCOSE UR QL: NEGATIVE MG/DL — SIGNIFICANT CHANGE UP
HCT VFR BLD CALC: 28.4 % — LOW (ref 42–52)
HGB BLD-MCNC: 9.4 G/DL — LOW (ref 14–18)
IMM GRANULOCYTES NFR BLD AUTO: 0.5 % — HIGH (ref 0.1–0.3)
KETONES UR-MCNC: 15 MG/DL
LACTATE SERPL-SCNC: 1.4 MMOL/L — SIGNIFICANT CHANGE UP (ref 0.7–2)
LACTATE SERPL-SCNC: 1.4 MMOL/L — SIGNIFICANT CHANGE UP (ref 0.7–2)
LACTATE SERPL-SCNC: 2.4 MMOL/L — HIGH (ref 0.7–2)
LEUKOCYTE ESTERASE UR-ACNC: ABNORMAL
LYMPHOCYTES # BLD AUTO: 1.31 K/UL — SIGNIFICANT CHANGE UP (ref 1.2–3.4)
LYMPHOCYTES # BLD AUTO: 11.8 % — LOW (ref 20.5–51.1)
MAGNESIUM SERPL-MCNC: 2 MG/DL — SIGNIFICANT CHANGE UP (ref 1.8–2.4)
MCHC RBC-ENTMCNC: 27 PG — SIGNIFICANT CHANGE UP (ref 27–31)
MCHC RBC-ENTMCNC: 33.1 G/DL — SIGNIFICANT CHANGE UP (ref 32–37)
MCV RBC AUTO: 81.6 FL — SIGNIFICANT CHANGE UP (ref 80–94)
MONOCYTES # BLD AUTO: 0.82 K/UL — HIGH (ref 0.1–0.6)
MONOCYTES NFR BLD AUTO: 7.4 % — SIGNIFICANT CHANGE UP (ref 1.7–9.3)
NEUTROPHILS # BLD AUTO: 8.88 K/UL — HIGH (ref 1.4–6.5)
NEUTROPHILS NFR BLD AUTO: 79.8 % — HIGH (ref 42.2–75.2)
NITRITE UR-MCNC: NEGATIVE — SIGNIFICANT CHANGE UP
NRBC # BLD: 0 /100 WBCS — SIGNIFICANT CHANGE UP (ref 0–0)
PH UR: 5.5 — SIGNIFICANT CHANGE UP (ref 5–8)
PLATELET # BLD AUTO: 441 K/UL — HIGH (ref 130–400)
PMV BLD: 10.2 FL — SIGNIFICANT CHANGE UP (ref 7.4–10.4)
POTASSIUM SERPL-MCNC: 3.3 MMOL/L — LOW (ref 3.5–5)
POTASSIUM SERPL-SCNC: 3.3 MMOL/L — LOW (ref 3.5–5)
PROT SERPL-MCNC: 7 G/DL — SIGNIFICANT CHANGE UP (ref 6–8)
PROT UR-MCNC: 30 MG/DL
RAPID RVP RESULT: SIGNIFICANT CHANGE UP
RBC # BLD: 3.48 M/UL — LOW (ref 4.7–6.1)
RBC # FLD: 16.8 % — HIGH (ref 11.5–14.5)
SARS-COV-2 RNA SPEC QL NAA+PROBE: SIGNIFICANT CHANGE UP
SODIUM SERPL-SCNC: 136 MMOL/L — SIGNIFICANT CHANGE UP (ref 135–146)
SP GR SPEC: 1.02 — SIGNIFICANT CHANGE UP (ref 1–1.03)
UROBILINOGEN FLD QL: 1 MG/DL — SIGNIFICANT CHANGE UP (ref 0.2–1)
WBC # BLD: 11.12 K/UL — HIGH (ref 4.8–10.8)
WBC # FLD AUTO: 11.12 K/UL — HIGH (ref 4.8–10.8)

## 2024-09-30 PROCEDURE — 83735 ASSAY OF MAGNESIUM: CPT

## 2024-09-30 PROCEDURE — 83605 ASSAY OF LACTIC ACID: CPT

## 2024-09-30 PROCEDURE — 83036 HEMOGLOBIN GLYCOSYLATED A1C: CPT

## 2024-09-30 PROCEDURE — 87075 CULTR BACTERIA EXCEPT BLOOD: CPT

## 2024-09-30 PROCEDURE — 74177 CT ABD & PELVIS W/CONTRAST: CPT | Mod: MC

## 2024-09-30 PROCEDURE — 36415 COLL VENOUS BLD VENIPUNCTURE: CPT

## 2024-09-30 PROCEDURE — 88112 CYTOPATH CELL ENHANCE TECH: CPT

## 2024-09-30 PROCEDURE — 87086 URINE CULTURE/COLONY COUNT: CPT

## 2024-09-30 PROCEDURE — 87102 FUNGUS ISOLATION CULTURE: CPT

## 2024-09-30 PROCEDURE — 87205 SMEAR GRAM STAIN: CPT

## 2024-09-30 PROCEDURE — 82042 OTHER SOURCE ALBUMIN QUAN EA: CPT

## 2024-09-30 PROCEDURE — 49083 ABD PARACENTESIS W/IMAGING: CPT

## 2024-09-30 PROCEDURE — 80053 COMPREHEN METABOLIC PANEL: CPT

## 2024-09-30 PROCEDURE — 80202 ASSAY OF VANCOMYCIN: CPT

## 2024-09-30 PROCEDURE — 93970 EXTREMITY STUDY: CPT

## 2024-09-30 PROCEDURE — 87641 MR-STAPH DNA AMP PROBE: CPT

## 2024-09-30 PROCEDURE — 0225U NFCT DS DNA&RNA 21 SARSCOV2: CPT

## 2024-09-30 PROCEDURE — 85730 THROMBOPLASTIN TIME PARTIAL: CPT

## 2024-09-30 PROCEDURE — 87015 SPECIMEN INFECT AGNT CONCNTJ: CPT

## 2024-09-30 PROCEDURE — 93306 TTE W/DOPPLER COMPLETE: CPT

## 2024-09-30 PROCEDURE — 87640 STAPH A DNA AMP PROBE: CPT

## 2024-09-30 PROCEDURE — 85610 PROTHROMBIN TIME: CPT

## 2024-09-30 PROCEDURE — 74177 CT ABD & PELVIS W/CONTRAST: CPT | Mod: 26

## 2024-09-30 PROCEDURE — 87070 CULTURE OTHR SPECIMN AEROBIC: CPT

## 2024-09-30 PROCEDURE — 82962 GLUCOSE BLOOD TEST: CPT

## 2024-09-30 PROCEDURE — 85025 COMPLETE CBC W/AUTO DIFF WBC: CPT

## 2024-09-30 PROCEDURE — 83615 LACTATE (LD) (LDH) ENZYME: CPT

## 2024-09-30 PROCEDURE — 82945 GLUCOSE OTHER FLUID: CPT

## 2024-09-30 PROCEDURE — 84100 ASSAY OF PHOSPHORUS: CPT

## 2024-09-30 PROCEDURE — 89051 BODY FLUID CELL COUNT: CPT

## 2024-09-30 PROCEDURE — 84157 ASSAY OF PROTEIN OTHER: CPT

## 2024-09-30 PROCEDURE — 99223 1ST HOSP IP/OBS HIGH 75: CPT

## 2024-09-30 RX ORDER — ALCOHOL ANTISEPTIC PADS
25 PADS, MEDICATED (EA) TOPICAL ONCE
Refills: 0 | Status: DISCONTINUED | OUTPATIENT
Start: 2024-09-30 | End: 2024-10-04

## 2024-09-30 RX ORDER — ALCOHOL ANTISEPTIC PADS
12.5 PADS, MEDICATED (EA) TOPICAL ONCE
Refills: 0 | Status: DISCONTINUED | OUTPATIENT
Start: 2024-09-30 | End: 2024-10-04

## 2024-09-30 RX ORDER — ATORVASTATIN CALCIUM 10 MG/1
40 TABLET, FILM COATED ORAL AT BEDTIME
Refills: 0 | Status: DISCONTINUED | OUTPATIENT
Start: 2024-09-30 | End: 2024-10-04

## 2024-09-30 RX ORDER — IOHEXOL 350 MG/ML
30 INJECTION, SOLUTION INTRAVENOUS ONCE
Refills: 0 | Status: COMPLETED | OUTPATIENT
Start: 2024-09-30 | End: 2024-09-30

## 2024-09-30 RX ORDER — ALCOHOL ANTISEPTIC PADS
15 PADS, MEDICATED (EA) TOPICAL ONCE
Refills: 0 | Status: DISCONTINUED | OUTPATIENT
Start: 2024-09-30 | End: 2024-10-04

## 2024-09-30 RX ORDER — CEFEPIME 2 G/1
INJECTION, POWDER, FOR SOLUTION INTRAVENOUS
Refills: 0 | Status: DISCONTINUED | OUTPATIENT
Start: 2024-09-30 | End: 2024-10-01

## 2024-09-30 RX ORDER — ENOXAPARIN SODIUM 150 MG/ML
40 INJECTION SUBCUTANEOUS EVERY 24 HOURS
Refills: 0 | Status: DISCONTINUED | OUTPATIENT
Start: 2024-09-30 | End: 2024-10-04

## 2024-09-30 RX ORDER — ASPIRIN 325 MG
81 TABLET ORAL DAILY
Refills: 0 | Status: DISCONTINUED | OUTPATIENT
Start: 2024-09-30 | End: 2024-10-01

## 2024-09-30 RX ORDER — SODIUM CHLORIDE IRRIG SOLUTION 0.9 %
1000 SOLUTION, IRRIGATION IRRIGATION
Refills: 0 | Status: DISCONTINUED | OUTPATIENT
Start: 2024-09-30 | End: 2024-10-04

## 2024-09-30 RX ORDER — INSULIN LISPRO 100/ML
VIAL (ML) SUBCUTANEOUS
Refills: 0 | Status: DISCONTINUED | OUTPATIENT
Start: 2024-09-30 | End: 2024-10-04

## 2024-09-30 RX ORDER — GLUCAGON INJECTION, SOLUTION 0.5 MG/.1ML
1 INJECTION, SOLUTION SUBCUTANEOUS ONCE
Refills: 0 | Status: DISCONTINUED | OUTPATIENT
Start: 2024-09-30 | End: 2024-10-04

## 2024-09-30 RX ORDER — CEFEPIME 2 G/1
2000 INJECTION, POWDER, FOR SOLUTION INTRAVENOUS ONCE
Refills: 0 | Status: COMPLETED | OUTPATIENT
Start: 2024-09-30 | End: 2024-09-30

## 2024-09-30 RX ORDER — VANCOMYCIN HCL-SODIUM CHLORIDE IV SOLN 1.5 GM/250ML-0.9% 1.5-0.9/25 GM/ML-%
1750 SOLUTION INTRAVENOUS EVERY 12 HOURS
Refills: 0 | Status: DISCONTINUED | OUTPATIENT
Start: 2024-09-30 | End: 2024-10-01

## 2024-09-30 RX ORDER — PIPERACILLIN SODIUM AND TAZOBACTAM SODIUM 12; 1.5 G/60ML; G/60ML
3.38 INJECTION, POWDER, LYOPHILIZED, FOR SOLUTION INTRAVENOUS EVERY 8 HOURS
Refills: 0 | Status: DISCONTINUED | OUTPATIENT
Start: 2024-09-30 | End: 2024-09-30

## 2024-09-30 RX ORDER — ACETAMINOPHEN 325 MG
650 TABLET ORAL EVERY 6 HOURS
Refills: 0 | Status: DISCONTINUED | OUTPATIENT
Start: 2024-09-30 | End: 2024-10-04

## 2024-09-30 RX ORDER — CEFEPIME 2 G/1
2000 INJECTION, POWDER, FOR SOLUTION INTRAVENOUS EVERY 8 HOURS
Refills: 0 | Status: DISCONTINUED | OUTPATIENT
Start: 2024-09-30 | End: 2024-10-01

## 2024-09-30 RX ADMIN — Medication 50 MILLIEQUIVALENT(S): at 16:11

## 2024-09-30 RX ADMIN — Medication 2400 MILLILITER(S): at 01:50

## 2024-09-30 RX ADMIN — VANCOMYCIN HCL-SODIUM CHLORIDE IV SOLN 1.5 GM/250ML-0.9% 250 MILLIGRAM(S): 1.5-0.9/25 SOLUTION at 20:35

## 2024-09-30 RX ADMIN — PIPERACILLIN SODIUM AND TAZOBACTAM SODIUM 25 GRAM(S): 12; 1.5 INJECTION, POWDER, LYOPHILIZED, FOR SOLUTION INTRAVENOUS at 06:11

## 2024-09-30 RX ADMIN — Medication 81 MILLIGRAM(S): at 13:37

## 2024-09-30 RX ADMIN — CEFEPIME 100 MILLIGRAM(S): 2 INJECTION, POWDER, FOR SOLUTION INTRAVENOUS at 13:37

## 2024-09-30 RX ADMIN — Medication 50 MILLIEQUIVALENT(S): at 22:56

## 2024-09-30 RX ADMIN — Medication 90 MILLIGRAM(S): at 06:10

## 2024-09-30 RX ADMIN — Medication 50 MILLIEQUIVALENT(S): at 18:16

## 2024-09-30 RX ADMIN — ATORVASTATIN CALCIUM 40 MILLIGRAM(S): 10 TABLET, FILM COATED ORAL at 22:31

## 2024-09-30 RX ADMIN — CEFEPIME 100 MILLIGRAM(S): 2 INJECTION, POWDER, FOR SOLUTION INTRAVENOUS at 22:57

## 2024-09-30 RX ADMIN — ENOXAPARIN SODIUM 40 MILLIGRAM(S): 150 INJECTION SUBCUTANEOUS at 06:10

## 2024-09-30 RX ADMIN — Medication 20 MILLIEQUIVALENT(S): at 00:22

## 2024-09-30 RX ADMIN — IOHEXOL 30 MILLILITER(S): 350 INJECTION, SOLUTION INTRAVENOUS at 04:52

## 2024-09-30 NOTE — H&P ADULT - NSHPPHYSICALEXAM_GEN_ALL_CORE
T(C): 37.4 (09-30-24 @ 01:52), Max: 38.5 (09-29-24 @ 20:56)  HR: 100 (09-30-24 @ 01:52) (100 - 120)  BP: 117/77 (09-30-24 @ 01:54) (113/74 - 134/77)  RR: 16 (09-30-24 @ 01:52) (16 - 19)  SpO2: 98% (09-30-24 @ 01:52) (96% - 99%)    CONSTITUTIONAL: looks tired  EYES: PERRLA and symmetric, EOMI, No conjunctival or scleral injection, non-icteric  ENMT: Oral mucosa with moist membranes. Normal dentition; no pharyngeal injection or exudates             NECK: Supple, symmetric and without tracheal deviation   RESP: No respiratory distress, no use of accessory muscles; CTA b/l, no WRR  CV: RRR, +S1S2, no MRG; no JVD; no peripheral edema  GI: non tender, distended abdomen,   LYMPH: No cervical LAD or tenderness; no axillary LAD or tenderness; no inguinal LAD or tenderness  MSK: Normal gait; No digital clubbing or cyanosis; examination of the (head/neck/spine/ribs/pelvis, RUE, LUE, RLE, LLE) without misalignment,            Normal ROM without pain, no spinal tenderness, normal muscle strength/tone  SKIN: No rashes or ulcers noted; no subcutaneous nodules or induration palpable  NEURO: CN II-XII intact; normal reflexes in upper and lower extremities, sensation intact in upper and lower extremities b/l to light touch   PSYCH: Appropriate insight/judgment; A+O x 3, mood and affect appropriate, recent/remote memory intact

## 2024-09-30 NOTE — CONSULT NOTE ADULT - ASSESSMENT
ASSESSMENT  64y Male with a PMH of Stage IV colon cancer, HTN , and DM presented with a one day history of fever at home and chills, blood cultures grew gram positive rods which is likely a false positive result.    IMPRESSION  # SIRS on admission  # Fever of unknown origin likely secondary to chemotherapy regimen  #Asymptomatic bacteremia  # Stage IV colon cancer  #HTN  #DM  #Hepatomegaly    RECOMMENDATIONS  - Continue with zosyn 3.375g IV q8 for one day, d/c tomorrow  - f/u pending cultures    This is a pended note. All final recommendations to follow pending discussion with ID Attending  ASSESSMENT  64y Male with a PMH of Stage IV colon cancer, HTN , and DM presented with a one day history of fever at home and chills, patient recently started on capecitabine as part of his chemotherapy regimen of CAPOX and GENET. Patient denies fevers, shortness of breath, chest pain, headache, or malaise while in the ED, Xray shows bilateral pleural effusions, lactate 1.4 from 2.4 and wbc 10.54.    IMPRESSION  # SIRS on admission  # Fever of unknown origin likely secondary to chemotherapy regimen  # Bliateral pleural effusion  # Stage IV colon cancer  #HTN  #DM  #Hepatomegaly    RECOMMENDATIONS  - Continue with zosyn 3.375g IV q8 for one day, d/c tomorrow  - f/u pending cultures    This is a pended note. All final recommendations to follow pending discussion with ID Attending  ASSESSMENT  64y Male with a PMH of Stage IV colon cancer, HTN , and DM presented with a one day history of fever at home and chills, patient recently started on capecitabine as part of his chemotherapy regimen of CAPOX and GENET. Patient denies fevers, shortness of breath, chest pain, headache, or malaise while in the ED, Xray shows bilateral pleural effusions, lactate 1.4 from 2.4 and wbc 10.54.    IMPRESSION  # SIRS on admission  # Fever of unknown origin likely secondary to chemotherapy regimen  # Bliateral pleural effusion  # Stage IV colon cancer  #HTN  #DM  #Hepatomegaly    RECOMMENDATIONS  - Continue with zosyn 3.375g IV q8 for one day, d/c tomorrow

## 2024-09-30 NOTE — H&P ADULT - NSHPREVIEWOFSYSTEMS_GEN_ALL_CORE
REVIEW OF SYSTEMS:  CONSTITUTIONAL: fever and chills  EYES: No eye pain, visual disturbances, or discharge  ENMT:  No difficulty hearing, tinnitus, vertigo; No sinus or throat pain  NECK: No pain or stiffness  BREASTS: No pain, masses, or nipple discharge  RESPIRATORY: No cough, wheezing, or hemoptysis; No shortness of breath  CARDIOVASCULAR: No chest pain, palpitations, dizziness, or leg swelling  GASTROINTESTINAL: No abdominal or epigastric pain. No nausea, vomiting, or hematemesis; No diarrhea or constipation. No melena or hematochezia.  GENITOURINARY: No dysuria, frequency, hematuria, or incontinence  NEUROLOGICAL: No headaches, memory loss, loss of strength, numbness, or tremors  SKIN: No itching, burning, rashes, or lesions   LYMPH NODES: No enlarged glands  ENDOCRINE: No heat or cold intolerance; No hair loss  MUSCULOSKELETAL: No joint pain or swelling; No muscle, back, or extremity pain  PSYCHIATRIC: No depression, anxiety, mood swings, or difficulty sleeping  HEME/LYMPH: No easy bruising, or bleeding gums  ALLERY AND IMMUNOLOGIC: No hives or eczema REVIEW OF SYSTEMS:  CONSTITUTIONAL: fever and chills  EYES: No eye pain, visual disturbances, or discharge  ENMT:  No difficulty hearing, tinnitus, vertigo; No sinus or throat pain  NECK: No pain or stiffness  BREASTS: No pain, masses, or nipple discharge  RESPIRATORY: No cough, wheezing, or hemoptysis; No shortness of breath  CARDIOVASCULAR: No chest pain, palpitations, dizziness, or leg swelling  GASTROINTESTINAL: No abdominal or epigastric pain. No nausea, vomiting, or hematemesis; No diarrhea or constipation. No melena or hematochezia.  GENITOURINARY: No dysuria, frequency, hematuria, or incontinence  NEUROLOGICAL: No headaches, memory loss, loss of strength, numbness, or tremors  SKIN: No itching, burning, rashes, or lesions. Has PICC line on the left arm with clean surroinding skin  LYMPH NODES: No enlarged glands  ENDOCRINE: No heat or cold intolerance; No hair loss  MUSCULOSKELETAL: No joint pain or swelling; No muscle, back, or extremity pain  PSYCHIATRIC: No depression, anxiety, mood swings, or difficulty sleeping  HEME/LYMPH: No easy bruising, or bleeding gums  ALLERY AND IMMUNOLOGIC: No hives or eczema

## 2024-09-30 NOTE — H&P ADULT - NSHPLABSRESULTS_GEN_ALL_CORE
9.2    10.54 )-----------( 410      ( 29 Sep 2024 22:50 )             27.9           137  |  93[L]  |  19  ----------------------------<  91  3.3[L]   |  30  |  0.9    Ca    9.0      29 Sep 2024 22:50    TPro  7.0  /  Alb  3.7  /  TBili  0.6  /  DBili  x   /  AST  66[H]  /  ALT  29  /  AlkPhos  384[H]                Urinalysis Basic - ( 30 Sep 2024 00:05 )    Color: Dark Yellow / Appearance: Clear / S.019 / pH: x  Gluc: x / Ketone: 15 mg/dL  / Bili: Negative / Urobili: 1.0 mg/dL   Blood: x / Protein: 30 mg/dL / Nitrite: Negative   Leuk Esterase: Trace / RBC: 1 /HPF / WBC 2 /HPF   Sq Epi: x / Non Sq Epi: 2 /HPF / Bacteria: Negative /HPF        PT/INR - ( 29 Sep 2024 22:50 )   PT: 15.10 sec;   INR: 1.32 ratio         PTT - ( 29 Sep 2024 22:50 )  PTT:26.7 sec    Lactate Trend   @ 22:50 Lactate:2.4             CAPILLARY BLOOD GLUCOSE            Culture Results:   No growth at 5 days ( @ 15:20)  Culture Results:   No fungus isolated at 2 weeks. ( @ 15:20)        RADIOLOGY & ADDITIONAL TESTS:    Imaging Personally Reviewed:  [ ] YES     EKG personally reviewed [ ] YES, interpretation:     Telemetry reviewed:

## 2024-09-30 NOTE — PATIENT PROFILE ADULT - TOBACCO USE
I performed a history and physical exam of the patient and discussed their management with the advanced care provider. I reviewed the advanced care provider's note and agree with the documented findings and plan of care. My medical decision making and objective findings are found above. Never smoker

## 2024-09-30 NOTE — H&P ADULT - HISTORY OF PRESENT ILLNESS
64 year old man with a past medical history of Stage IV colon cancer (follows with Dr. Solo), HTN , DM, presented for fever of 1 day duration. Patient reported that he developed fever od 38.2 today and was having severe chills all over his body which prompted him to present to the ED. Patient reported that has no other symptoms such as sob, chest pain, bowel habit changes, urinary habit changes or rashes. He also denied sore throat or nasal congestion . Patient started last week on Capecitabine, part of his scheduled CAPOX plus GENET regimen. He denied coming in contact with a sick person. He did not travel    in the ED:   T: 38.5     HR: 120    BP: 134/77     RR: 19    Spo2: 100% on room air  EKG: sinus tachycardia  CXR: No infiltrates     Patient was given 2.4 LR bolus in the ED (HR corrected) , Vancomycin and cefepime 64 year old man with a past medical history of Stage IV colon cancer (follows with Dr. Solo), HTN , DM, presented for fever of 1 day duration. Patient reported that he developed fever od 38.2 today and was having severe chills all over his body which prompted him to present to the ED. Patient reported that has no other symptoms such as sob, chest pain, bowel habit changes, urinary habit changes or rashes. He also denied sore throat or nasal congestion . Patient started last week on Capecitabine, part of his scheduled CAPOX plus GENET regimen. He denied coming in contact with a sick person. He did not travel    in the ED:   T: 38.5     HR: 120    BP: 134/77     RR: 19    Spo2: 100% on room air  EKG: sinus tachycardia  CXR: No infiltrates     UA negative   Lac: 2.3    Patient was given 2.4 LR bolus in the ED (HR corrected) , Vancomycin and cefepime

## 2024-09-30 NOTE — H&P ADULT - ASSESSMENT
64 year old man with a past medical history of Stage IV colon cancer (follows with Dr. Solo), HTN , DM, presented for fever of 1 day duration    #Fever-no clear source   #Colon cancer Stage IV  #Patient recently started on  Capecitabine last week  - Patient reported that he developed fever od 38.2 today and was having severe chills all over his body. PE unrevealing. Has a PICC line on the left arm with clean surrounding skin  - Patient started last week on Capecitabine, part of his scheduled CAPOX plus GENET regimen. Follows with Dr. Patel  - Patient had a   -in the ED:   T: 38.5     HR: 120    BP: 134/77     RR: 19    Spo2: 100% on room air  -EKG: sinus tachycardia  -CXR: No infiltrates   -UA negative   Lac: 2.3  - Patient was given 2.4 LR bolus in the ED (HR corrected) , Vancomycin and cefepime  - Will start patient on Zosyn  - Will order a CT abdomen/pelvis with IV contrast.   - ID consult   - Consider TTE if patient has persistent fever and CT abdomen/pelvis is negativve  - Will order expansive RVP    #HTN  #DM  - Patient on home HCTZ 12.5, Nifedipine 60 mg ER, Januvia, and Pioglitzanoe  - Will put patient on Sliding scale only    #Full code   #DVT prophylaxis: LMWH   64 year old man with a past medical history of Stage IV colon cancer (follows with Dr. Solo), HTN , DM, presented for fever of 1 day duration    #Fever-no clear source   #Colon cancer Stage IV  #Patient recently started on  Capecitabine last week  - Patient reported that he developed fever od 38.2 today and was having severe chills all over his body. PE unrevealing. Has a PICC line on the left arm with clean surrounding skin  - Patient started last week on Capecitabine, part of his scheduled CAPOX plus GENET regimen. Follows with Dr. Patel  - Patient had a paracentesis for ascites back in early September that did not grow bacteria nor reveal PMNs  -in the ED:   T: 38.5     HR: 120    BP: 134/77     RR: 19    Spo2: 100% on room air  -EKG: sinus tachycardia  -CXR: No infiltrates   -UA negative   Lac: 2.3  - Patient was given 2.4 LR bolus in the ED (HR corrected) , Vancomycin and cefepime  - Will start patient on Zosyn  - Will order a CT abdomen/pelvis with IV contrast.   - ID consult   - Consider TTE if patient has persistent fever and CT abdomen/pelvis is negativve  - Will order expansive RVP    #HTN  #DM  - Patient on home HCTZ 12.5, Nifedipine 60 mg ER, Januvia, and Pioglitzanoe  - Will put patient on Sliding scale only    #Full code   #DVT prophylaxis: LMWH   64 year old man with a past medical history of Stage IV colon cancer (follows with Dr. Solo), HTN , DM, presented for fever of 1 day duration    #Fever-no clear source- No neutropenia   #Colon cancer Stage IV  #Patient recently started on  Capecitabine last week  - Patient reported that he developed fever od 38.2 today and was having severe chills all over his body. PE unrevealing. Has a PICC line on the left arm with clean surrounding skin  - Patient started last week on Capecitabine, part of his scheduled CAPOX plus GENET regimen. Follows with Dr. Patel  - Patient had a paracentesis for ascites back in early September that did not grow bacteria. Neutrophil 25%<  -in the ED:   T: 38.5     HR: 120    BP: 134/77     RR: 19    Spo2: 100% on room air  -EKG: sinus tachycardia  -CXR: No infiltrates   -UA negative   Lac: 2.3  - Patient was given 2.4 LR bolus in the ED (HR corrected) , Vancomycin and cefepime  - Will start patient on Zosyn  - Will order a CT abdomen/pelvis with IV contrast.   - ID consult   - Consider TTE if patient has persistent fever and CT abdomen/pelvis is negativve  - Will order expansive RVP    #HTN  #DM  - Patient on home HCTZ 12.5, Nifedipine 60 mg ER, Januvia, and Pioglitzanoe  - Will put patient on Sliding scale only    #Full code   #DVT prophylaxis: LMWH

## 2024-09-30 NOTE — H&P ADULT - ATTENDING COMMENTS
64M from home with PMH: Stage 4 Colon CA with Mets to Liver/Lung sees Dr Murphy, HTN, DM2, on active chemoRx via LUE PICC Line who presents with fevers Tmax 101 and HR > 110 meeting criteria for SIRS and suspected Infection POA (Sepsis) vs SIRS due to Chemotherapy and Malignancy however it's hard to differentiate etiology at this time.   Pt on Capecitabine   Denies Sick Contacts   Denies recent travel   Denies dysurea and UA not suggestive of infection   CXR abnormal with blunted CFA suggesting more consolidative opacity vs malignancy (mets)   At this time vitals are reasonable (not unstable) and will cover for infection.     IMPRESSION:   Immunocompromised State due to Stage 4 Colon CA and Active ChemoRx   SIRS w/ suspected infection = Sepsis POA unless infection is ruled out - cover with abx / Rule out CLABSI (PICC Line in place)   Stage 4 Colon CA (hold chemo for now till onc recs)   HypoKalemia     PLAN:   KCL Supplementation to 4.0   ID Recs noted   ONC Consultation   Cefepime / Vancomycin   f/u Blood cxs   f/u Urine cxs   f/u RVP   keep PICC Line at this time no tenderness or erythema in surrounding line - looks good   check ESR/CRP/Pro-Brien   check MRSA Nares   (viral panel neg)   f/u CBC/CMP am   Check Duplex B/L Legs r/o DVTs   Check TTE (no echo in sunrise - pt with abnormal CXR and B/L Leg Edema)   Resume Home Meds except oral chemorx     GI and DVT Proph: Protonix / Lovenox     Code Status FULL pls discuss further GOC on floors w/ Family

## 2024-10-01 ENCOUNTER — RESULT REVIEW (OUTPATIENT)
Age: 64
End: 2024-10-01

## 2024-10-01 LAB
ALBUMIN SERPL ELPH-MCNC: 3.4 G/DL — LOW (ref 3.5–5.2)
ALP SERPL-CCNC: 324 U/L — HIGH (ref 30–115)
ALT FLD-CCNC: 25 U/L — SIGNIFICANT CHANGE UP (ref 0–41)
ANION GAP SERPL CALC-SCNC: 12 MMOL/L — SIGNIFICANT CHANGE UP (ref 7–14)
AST SERPL-CCNC: 58 U/L — HIGH (ref 0–41)
BASOPHILS # BLD AUTO: 0.03 K/UL — SIGNIFICANT CHANGE UP (ref 0–0.2)
BASOPHILS NFR BLD AUTO: 0.3 % — SIGNIFICANT CHANGE UP (ref 0–1)
BILIRUB SERPL-MCNC: 0.5 MG/DL — SIGNIFICANT CHANGE UP (ref 0.2–1.2)
BUN SERPL-MCNC: 9 MG/DL — LOW (ref 10–20)
CALCIUM SERPL-MCNC: 8.6 MG/DL — SIGNIFICANT CHANGE UP (ref 8.4–10.5)
CHLORIDE SERPL-SCNC: 95 MMOL/L — LOW (ref 98–110)
CO2 SERPL-SCNC: 29 MMOL/L — SIGNIFICANT CHANGE UP (ref 17–32)
CREAT SERPL-MCNC: 0.7 MG/DL — SIGNIFICANT CHANGE UP (ref 0.7–1.5)
EGFR: 102 ML/MIN/1.73M2 — SIGNIFICANT CHANGE UP
EOSINOPHIL # BLD AUTO: 0.03 K/UL — SIGNIFICANT CHANGE UP (ref 0–0.7)
EOSINOPHIL NFR BLD AUTO: 0.3 % — SIGNIFICANT CHANGE UP (ref 0–8)
GLUCOSE BLDC GLUCOMTR-MCNC: 104 MG/DL — HIGH (ref 70–99)
GLUCOSE BLDC GLUCOMTR-MCNC: 119 MG/DL — HIGH (ref 70–99)
GLUCOSE BLDC GLUCOMTR-MCNC: 98 MG/DL — SIGNIFICANT CHANGE UP (ref 70–99)
GLUCOSE SERPL-MCNC: 106 MG/DL — HIGH (ref 70–99)
HCT VFR BLD CALC: 28.6 % — LOW (ref 42–52)
HGB BLD-MCNC: 9.5 G/DL — LOW (ref 14–18)
IMM GRANULOCYTES NFR BLD AUTO: 0.7 % — HIGH (ref 0.1–0.3)
LYMPHOCYTES # BLD AUTO: 1.33 K/UL — SIGNIFICANT CHANGE UP (ref 1.2–3.4)
LYMPHOCYTES # BLD AUTO: 15 % — LOW (ref 20.5–51.1)
MAGNESIUM SERPL-MCNC: 1.9 MG/DL — SIGNIFICANT CHANGE UP (ref 1.8–2.4)
MCHC RBC-ENTMCNC: 27.2 PG — SIGNIFICANT CHANGE UP (ref 27–31)
MCHC RBC-ENTMCNC: 33.2 G/DL — SIGNIFICANT CHANGE UP (ref 32–37)
MCV RBC AUTO: 81.9 FL — SIGNIFICANT CHANGE UP (ref 80–94)
MONOCYTES # BLD AUTO: 1.23 K/UL — HIGH (ref 0.1–0.6)
MONOCYTES NFR BLD AUTO: 13.9 % — HIGH (ref 1.7–9.3)
MRSA PCR RESULT.: NEGATIVE — SIGNIFICANT CHANGE UP
NEUTROPHILS # BLD AUTO: 6.18 K/UL — SIGNIFICANT CHANGE UP (ref 1.4–6.5)
NEUTROPHILS NFR BLD AUTO: 69.8 % — SIGNIFICANT CHANGE UP (ref 42.2–75.2)
NRBC # BLD: 0 /100 WBCS — SIGNIFICANT CHANGE UP (ref 0–0)
PLATELET # BLD AUTO: 445 K/UL — HIGH (ref 130–400)
PMV BLD: 10 FL — SIGNIFICANT CHANGE UP (ref 7.4–10.4)
POTASSIUM SERPL-MCNC: 3.3 MMOL/L — LOW (ref 3.5–5)
POTASSIUM SERPL-SCNC: 3.3 MMOL/L — LOW (ref 3.5–5)
PROT SERPL-MCNC: 6.8 G/DL — SIGNIFICANT CHANGE UP (ref 6–8)
RBC # BLD: 3.49 M/UL — LOW (ref 4.7–6.1)
RBC # FLD: 17.1 % — HIGH (ref 11.5–14.5)
SODIUM SERPL-SCNC: 136 MMOL/L — SIGNIFICANT CHANGE UP (ref 135–146)
VANCOMYCIN TROUGH SERPL-MCNC: 46.1 UG/ML — HIGH (ref 5–10)
WBC # BLD: 8.86 K/UL — SIGNIFICANT CHANGE UP (ref 4.8–10.8)
WBC # FLD AUTO: 8.86 K/UL — SIGNIFICANT CHANGE UP (ref 4.8–10.8)

## 2024-10-01 PROCEDURE — 99223 1ST HOSP IP/OBS HIGH 75: CPT

## 2024-10-01 PROCEDURE — 93306 TTE W/DOPPLER COMPLETE: CPT | Mod: 26

## 2024-10-01 PROCEDURE — 93970 EXTREMITY STUDY: CPT | Mod: 26

## 2024-10-01 PROCEDURE — 99448 NTRPROF PH1/NTRNET/EHR 21-30: CPT

## 2024-10-01 PROCEDURE — 99233 SBSQ HOSP IP/OBS HIGH 50: CPT

## 2024-10-01 RX ORDER — PIPERACILLIN SODIUM AND TAZOBACTAM SODIUM 12; 1.5 G/60ML; G/60ML
3.38 INJECTION, POWDER, LYOPHILIZED, FOR SOLUTION INTRAVENOUS EVERY 8 HOURS
Refills: 0 | Status: DISCONTINUED | OUTPATIENT
Start: 2024-10-01 | End: 2024-10-02

## 2024-10-01 RX ORDER — PIPERACILLIN SODIUM AND TAZOBACTAM SODIUM 12; 1.5 G/60ML; G/60ML
3.38 INJECTION, POWDER, LYOPHILIZED, FOR SOLUTION INTRAVENOUS ONCE
Refills: 0 | Status: COMPLETED | OUTPATIENT
Start: 2024-10-01 | End: 2024-10-01

## 2024-10-01 RX ORDER — SENNOSIDES 8.6 MG
2 TABLET ORAL AT BEDTIME
Refills: 0 | Status: DISCONTINUED | OUTPATIENT
Start: 2024-10-01 | End: 2024-10-04

## 2024-10-01 RX ORDER — DRONABINOL 5 MG/1
5 CAPSULE ORAL DAILY
Refills: 0 | Status: DISCONTINUED | OUTPATIENT
Start: 2024-10-01 | End: 2024-10-04

## 2024-10-01 RX ORDER — FUROSEMIDE 10 MG/ML
40 INJECTION INTRAVENOUS DAILY
Refills: 0 | Status: DISCONTINUED | OUTPATIENT
Start: 2024-10-01 | End: 2024-10-04

## 2024-10-01 RX ORDER — BENZONATATE 150 MG/1
100 CAPSULE ORAL EVERY 8 HOURS
Refills: 0 | Status: DISCONTINUED | OUTPATIENT
Start: 2024-10-01 | End: 2024-10-04

## 2024-10-01 RX ORDER — CHLORHEXIDINE GLUCONATE ORAL RINSE 1.2 MG/ML
1 SOLUTION DENTAL
Refills: 0 | Status: DISCONTINUED | OUTPATIENT
Start: 2024-10-01 | End: 2024-10-04

## 2024-10-01 RX ADMIN — Medication 81 MILLIGRAM(S): at 11:13

## 2024-10-01 RX ADMIN — CHLORHEXIDINE GLUCONATE ORAL RINSE 1 APPLICATION(S): 1.2 SOLUTION DENTAL at 11:26

## 2024-10-01 RX ADMIN — Medication 17 GRAM(S): at 11:14

## 2024-10-01 RX ADMIN — Medication 40 MILLIEQUIVALENT(S): at 13:23

## 2024-10-01 RX ADMIN — PIPERACILLIN SODIUM AND TAZOBACTAM SODIUM 25 GRAM(S): 12; 1.5 INJECTION, POWDER, LYOPHILIZED, FOR SOLUTION INTRAVENOUS at 11:25

## 2024-10-01 RX ADMIN — PIPERACILLIN SODIUM AND TAZOBACTAM SODIUM 200 GRAM(S): 12; 1.5 INJECTION, POWDER, LYOPHILIZED, FOR SOLUTION INTRAVENOUS at 09:36

## 2024-10-01 RX ADMIN — Medication 40 MILLIEQUIVALENT(S): at 11:14

## 2024-10-01 RX ADMIN — PIPERACILLIN SODIUM AND TAZOBACTAM SODIUM 25 GRAM(S): 12; 1.5 INJECTION, POWDER, LYOPHILIZED, FOR SOLUTION INTRAVENOUS at 21:51

## 2024-10-01 RX ADMIN — ATORVASTATIN CALCIUM 40 MILLIGRAM(S): 10 TABLET, FILM COATED ORAL at 21:54

## 2024-10-01 RX ADMIN — VANCOMYCIN HCL-SODIUM CHLORIDE IV SOLN 1.5 GM/250ML-0.9% 250 MILLIGRAM(S): 1.5-0.9/25 SOLUTION at 05:27

## 2024-10-01 RX ADMIN — CEFEPIME 100 MILLIGRAM(S): 2 INJECTION, POWDER, FOR SOLUTION INTRAVENOUS at 05:28

## 2024-10-01 RX ADMIN — Medication 2 TABLET(S): at 21:55

## 2024-10-01 RX ADMIN — ENOXAPARIN SODIUM 40 MILLIGRAM(S): 150 INJECTION SUBCUTANEOUS at 05:26

## 2024-10-01 RX ADMIN — Medication 90 MILLIGRAM(S): at 05:25

## 2024-10-01 NOTE — PROGRESS NOTE ADULT - ATTENDING COMMENTS
My note supersedes the resident's note in the event of a discrepancy -    Patient seen and examined this morning  sitting in bed  in nad   denies any complaints  daughter is bedside    Vital Signs Last 24 Hrs  T(C): 36.8 (01 Oct 2024 15:00), Max: 36.8 (01 Oct 2024 00:37)  T(F): 98.2 (01 Oct 2024 15:00), Max: 98.2 (01 Oct 2024 00:37)  HR: 84 (01 Oct 2024 15:00) (82 - 105)  BP: 134/80 (01 Oct 2024 15:00) (105/71 - 135/81)  BP(mean): --  RR: 18 (01 Oct 2024 15:00) (16 - 18)  SpO2: 98% (01 Oct 2024 15:00) (96% - 98%)    Parameters below as of 01 Oct 2024 15:00  Patient On (Oxygen Delivery Method): room air    64 year old man with a past medical history of Stage IV colon cancer (follows with Dr. Solo), HTN , DM, presented for fever of 1 day duration. Heme/onc consulted for further continuation of chemo management.    #Fever-no clear source- No neutropenia/blood cxs neg at 24hr  #Colon cancer Stage IV with mets to liver, peritoneum and lungs.  #Ascites  #Patient recently started on Capecitabine last week 9/23/24; last dose on 9/29/24; s/p Oxaliplatin and Zirabev on 9/23/24  today's cbc: 8.86>9.5/28.6<445  - ID and heme/onc following - dc abx in am  - follow up cultures  - monitor wbc and fever curve  - MRSA and RVP swab negative  - urine culture sent today   - IR consulted for paracentesis (patient had paracentesis with IR x 2 in the past) they asked for ASA to be held and will be performed on Friday - if patient is stable - recommend arranging for outpatient para with IR on Friday  - started on Lasix today to improve ascites and LE edema  - called bed management and inquired about moving patient to 3B    #Constipated   - started on bowel regimen     #HTN  #DM II  - stable on current regimen   - monitor fs     #obesity  -diet and lifestyle modification     #suspected potassium deficiency   -supplement    Progress Note Handoff  Pending Consults: oncology  Pending Tests: labs  Pending Results: labs, cultures  Family Discussion: Discussed labs, meds, cultures, abx, consults and overall plan of care with pt, his daughter bedside and medical staff. All questions answered.   Disposition: Home__x___/SNF______/Other_____/Unknown at this time_____  Spent over 55 min reviewing chart, speaking with patient/family and on coordinating patient care during interdisciplinary rounds

## 2024-10-01 NOTE — CONSULT NOTE ADULT - CONSULT REASON
64 year old man with stage 4 colon cancer on CAPOX and GENET presented for fever of 1 day duration.
hx of stage 4 colon cancer; patient came in septic--primary team unsure if Capecitabine should be continued
paracentesis

## 2024-10-01 NOTE — PHARMACOTHERAPY INTERVENTION NOTE - COMMENTS
As per policy, ordered a vancomycin trough level for 10/1 @0430 prior to the 3rd dose on vancomycin regimen 1750mg IV q12hrs to assist with further vancomycin dosing optimization.     Rachelle Barlow, PharmD  Clinical Pharmacy Specialist, Infectious Diseases  Tele-Antimicrobial Stewardship Program (Tele-[facility])  Tele-ASP Phone: [phone]

## 2024-10-01 NOTE — CONSULT NOTE ADULT - ATTENDING COMMENTS
The patient was seen. Agree with above.
ASSESSMENT  64y Male with a PMH of Stage IV colon cancer, HTN , and DM presented with a one day history of fever at home and chills, patient recently started on capecitabine as part of his chemotherapy regimen of CAPOX and GENET. Patient denies fevers, shortness of breath, chest pain, headache, or malaise while in the ED, Xray shows bilateral pleural effusions, lactate 1.4 from 2.4 and wbc 10.54.    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   Doubt infectious etiology of SIRS  Heavy tumor burden with extensive liver mets  SIRS secondary to recent CT and tumor burden  No evidence of cholangitis  RLL with atelectatic changes  9/30 CT with b/l pleural effusions and atelectasis RLL  WBC 11.1  COVID 19/ Influenza/ RSV NG.     # Stage IV colon cancer  #HTN  #DM  #Hepatomegaly    -Zosyn 3.375 gm iv q8h over 4h for now  -will hold soon    RECOMMENDATIONS  - Continue with zosyn 3.375g IV q8 for one day, d/c tomorrow

## 2024-10-01 NOTE — CONSULT NOTE ADULT - SUBJECTIVE AND OBJECTIVE BOX
JADA QUIROZ  64y, Male  Allergy: No Known Allergies    CHIEF COMPLAINT:   HPI:  HPI:  64 year old man with a past medical history of Stage IV colon cancer (follows with Dr. Solo), HTN , DM, presented for fever of 1 day duration. Patient reported that he developed fever od 38.2 the day of admission and was having severe chills all over his body which prompted him to present to the ED. Patient endorses occasional cough, sweating, and decreased appetite since starting chemotherapy regimen, He denies  other symptoms such as sob, chest pain, bowel habit changes, urinary habit changes or rashes. He also denied sore throat or nasal congestion . Patient started last week on Capecitabine, part of his scheduled CAPOX plus GENET regimen. Next chemotherapy appointment is on the 15th He denied coming in contact with a sick person. He did not travel    in the ED:   T: 38.5     HR: 120    BP: 134/77     RR: 19    Spo2: 100% on room air  EKG: sinus tachycardia  CXR: No infiltrates     UA negative   Lac: 2.3    Patient was given 2.4 LR bolus in the ED (HR corrected) , Vancomycin and cefepime (30 Sep 2024 03:00)      Infectious Diseases History:  Old Micro Data/Cultures:     FAMILY HISTORY:    PAST MEDICAL & SURGICAL HISTORY:  DM (diabetes mellitus)      Obesity      High cholesterol      HTN (hypertension)      Colon cancer      Ascites      No significant past surgical history          SOCIAL HISTORY  Social History:      Recent Travel:  Other Exposures:     ROS  General: Denies rigors, nightsweats  HEENT: Denies headache, rhinorrhea, sore throat, eye pain  CV: Denies CP, palpitations  PULM: Denies wheezing, hemoptysis  GI: Denies hematemesis, hematochezia, melena  : Denies discharge, hematuria  MSK: Denies arthralgias, myalgias  SKIN: Denies rash, lesions  NEURO: Denies paresthesias, weakness  PSYCH: Denies depression, anxiety    VITALS:  T(F): 98.4, Max: 101.3 (09-29-24 @ 20:56)  HR: 89  BP: 127/81  RR: 20Vital Signs Last 24 Hrs  T(C): 36.9 (30 Sep 2024 07:26), Max: 38.5 (29 Sep 2024 20:56)  T(F): 98.4 (30 Sep 2024 07:26), Max: 101.3 (29 Sep 2024 20:56)  HR: 89 (30 Sep 2024 07:26) (83 - 120)  BP: 127/81 (30 Sep 2024 07:26) (113/74 - 134/77)  BP(mean): --  RR: 20 (30 Sep 2024 07:26) (16 - 20)  SpO2: 95% (30 Sep 2024 07:26) (95% - 99%)    Parameters below as of 30 Sep 2024 07:26  Patient On (Oxygen Delivery Method): room air        PHYSICAL EXAM:  Gen: NAD, resting in bed  HEENT: Normocephalic, atraumatic  Neck: supple, no lymphadenopathy  CV: Regular rate & regular rhythm  Lungs: CTAB  Abdomen: Tense, no distended, non tender to palpation. BS present.   Large hepatomegaly noted   Ext: Warm, well perfused  Neuro: non focal, awake  Skin: no rash, no lesions  Lines: no phlebitis    TESTS & MEASUREMENTS:                        9.4    11.12 )-----------( 441      ( 30 Sep 2024 08:51 )             28.4     09-30    136  |  93[L]  |  15  ----------------------------<  83  3.3[L]   |  30  |  0.8    Ca    8.9      30 Sep 2024 08:51  Mg     2.0     09-30    TPro  7.0  /  Alb  3.5  /  TBili  0.6  /  DBili  x   /  AST  65[H]  /  ALT  28  /  AlkPhos  372[H]  09-30      LIVER FUNCTIONS - ( 30 Sep 2024 08:51 )  Alb: 3.5 g/dL / Pro: 7.0 g/dL / ALK PHOS: 372 U/L / ALT: 28 U/L / AST: 65 U/L / GGT: x           Urinalysis Basic - ( 30 Sep 2024 08:51 )    Color: x / Appearance: x / SG: x / pH: x  Gluc: 83 mg/dL / Ketone: x  / Bili: x / Urobili: x   Blood: x / Protein: x / Nitrite: x   Leuk Esterase: x / RBC: x / WBC x   Sq Epi: x / Non Sq Epi: x / Bacteria: x        Urinalysis with Rflx Culture (collected 09-30-24 @ 00:05)        Lactate, Blood: 1.4 mmol/L (09-30-24 @ 08:51)  Lactate, Blood: 1.4 mmol/L (09-30-24 @ 05:00)  Lactate, Blood: 2.4 mmol/L (09-29-24 @ 22:50)      INFECTIOUS DISEASES TESTING      RADIOLOGY & ADDITIONAL TESTS:  I have personally reviewed the last available Chest xray  CXR  Xray Chest 1 View- PORTABLE-Urgent:   ACC: [id number] EXAM:  XR CHEST PORTABLE URGENT 1V   ORDERED BY: JOY PENA     PROCEDURE DATE:  09/29/2024          INTERPRETATION:  CLINICAL HISTORY / REASON FOR EXAM: Shortness of breath.    COMPARISON: Chest radiograph from April 26, 2020.    TECHNIQUE/POSITIONING: Satisfactory. Single image, AP chest radiograph.    FINDINGS:    SUPPORT DEVICES: Left upper extremity PICC line with distal tip overlying   the cavoatrial junction.    CARDIAC/MEDIASTINUM/HILUM: Unremarkable cardiac silhouette.    LUNG PARENCHYMA/PLEURA: Bilateral opacities and effusions, left greater   than right.    SKELETON/SOFT TISSUES: Unremarkable.      IMPRESSION:    Bilateral opacities and effusions, left greater than right.    --- End of Report ---            JESSICA HERNÁNDEZ MD; Attending Radiologist  This document has been electronically signed. Sep 30 2024 10:27AM (09-29-24 @ 21:38)      CT      CARDIOLOGY TESTING  12 Lead ECG:   Ventricular Rate 111 BPM    Atrial Rate 111 BPM    P-R Interval 154 ms    QRS Duration 90 ms    Q-T Interval 350 ms    QTC Calculation(Bazett) 476 ms    P Axis 41 degrees    R Axis 52 degrees    T Axis 44 degrees    Diagnosis Line Sinus tachycardia  Nonspecific T wave abnormality  Abnormal ECG    Confirmed by SHOAIB IBANEZ, [facility] ([id number]) on 9/29/2024 10:57:09 PM (09-29-24 @ 22:33)      All available historical records have been reviewed    MEDICATIONS  aspirin  chewable 81  atorvastatin 40  dextrose 5%. 1000  dextrose 5%. 1000  dextrose 50% Injectable 12.5  dextrose 50% Injectable 25  dextrose 50% Injectable 25  enoxaparin Injectable 40  glucagon  Injectable 1  hydrochlorothiazide 12.5  insulin lispro (ADMELOG) corrective regimen sliding scale   NIFEdipine XL 90  piperacillin/tazobactam IVPB.. 3.375      ANTIBIOTICS:  piperacillin/tazobactam IVPB.. 3.375 Gram(s) IV Intermittent every 8 hours      All available historical data has been reviewed
Hematology Consult Note    HPI:  64 year old man with a past medical history of Stage IV colon cancer (follows with Dr. Solo), HTN , DM, presented for fever of 1 day duration. Patient reported that he developed fever od 38.2 today and was having severe chills all over his body which prompted him to present to the ED. Patient reported that has no other symptoms such as sob, chest pain, bowel habit changes, urinary habit changes or rashes. He also denied sore throat or nasal congestion . Patient started last week on Capecitabine, part of his scheduled CAPOX plus GENET regimen. He denied coming in contact with a sick person. He did not travel.    in the ED:   T: 38.5     HR: 120    BP: 134/77     RR: 19    Spo2: 100% on room air  EKG: sinus tachycardia  CXR: No infiltrates   UA negative   Lac: 2.3  Patient was given 2.4 LR bolus in the ED (HR corrected) , Vancomycin and cefepime (30 Sep 2024 03:00)    Hematology/Onc consult requested as patient came in septic--primary team unsure if Capecitabine should be continued:  Of chart, pt seen by Dr. Cortez on 8/12/24:  Initially, patient had a colonoscopy in 03/2021, patient was found to have cecal polyp that could not be resected so he was referred to Dr. Garcia for the EMR of cecal polyp. In 08/2021, patient had colonoscopy in which patient was found to 7-8 cm sessile cecal polyp, that was "partially" removed via EMR, however, it was a long procedure and patient needed intubation, it was a difficult intubation, and the procedure was aborted with the plan to complete resection with a specialized "Pathfinder device". Patient was lost for follow up.  ?  3 months ago, patient noticed abdominal distention, pain and early satiety. He had a CT abdomen done that revealed " Mild hepatomegaly, with various liver lesions. Largest lesion localized to right hepatic lobe measuring 8 cm in diameter. Alos, 8.8 cm segment of abnormal proximal colon which revealed marked circumferential wall thickening raising suspicion for annular constricting lesions. It also showed severe, asymmetric loculated ascites with evidence of carcinomatosis.  ?  Patient reports early satiety, abdominal distention. He reports regular bowel movements, denies any diarrhea/constipation. He lost 20 olbs over 2 years. No Fhx of cancer.     Today, pt seen at bedside along with daughter Yasmin over the phone. Per pt and daughter; pt received Oxaliplatin and Zirabev on 9/23/2024. At the same day, Capecitabine PO started. Pt took the first dose on 9/23/24 until 9/29/24. Pt then reports on 9/29 pt developed fever of 38.2 associated with severe chills decided to come to ED for further evaluation. Pt denies any further fevers/chills. Admits constipation; last bowel movement 4 days ago. Denies any diarrhea, nausea or vomiting. Admits abdominal discomfort and b/l leg swelling. Denies CP, SOB or lightheadedness. Admits paracentesis x 2 in the past; last paracentesis on 9/9/24 by IR.        Allergies    No Known Allergies    Intolerances        MEDICATIONS  (STANDING):  atorvastatin 40 milliGRAM(s) Oral at bedtime  chlorhexidine 2% Cloths 1 Application(s) Topical <User Schedule>  dextrose 5%. 1000 milliLiter(s) (50 mL/Hr) IV Continuous <Continuous>  dextrose 5%. 1000 milliLiter(s) (100 mL/Hr) IV Continuous <Continuous>  dextrose 50% Injectable 25 Gram(s) IV Push once  dextrose 50% Injectable 12.5 Gram(s) IV Push once  dextrose 50% Injectable 25 Gram(s) IV Push once  dronabinol 5 milliGRAM(s) Oral daily  enoxaparin Injectable 40 milliGRAM(s) SubCutaneous every 24 hours  furosemide    Tablet 40 milliGRAM(s) Oral daily  glucagon  Injectable 1 milliGRAM(s) IntraMuscular once  hydrochlorothiazide 12.5 milliGRAM(s) Oral daily  insulin lispro (ADMELOG) corrective regimen sliding scale   SubCutaneous three times a day before meals  NIFEdipine XL 90 milliGRAM(s) Oral daily  piperacillin/tazobactam IVPB.. 3.375 Gram(s) IV Intermittent every 8 hours  polyethylene glycol 3350 17 Gram(s) Oral two times a day  senna 2 Tablet(s) Oral at bedtime    MEDICATIONS  (PRN):  acetaminophen     Tablet .. 650 milliGRAM(s) Oral every 6 hours PRN Temp greater or equal to 38C (100.4F), Mild Pain (1 - 3)  benzonatate 100 milliGRAM(s) Oral every 8 hours PRN Cough  dextrose Oral Gel 15 Gram(s) Oral once PRN Blood Glucose LESS THAN 70 milliGRAM(s)/deciliter      PAST MEDICAL & SURGICAL HISTORY:  DM (diabetes mellitus)      Obesity      High cholesterol      HTN (hypertension)      Colon cancer      Ascites      No significant past surgical history          FAMILY HISTORY:      SOCIAL HISTORY: No EtOH, no tobacco    REVIEW OF SYSTEMS:    CONSTITUTIONAL: No weakness, fevers or chills  EYES/ENT: No visual changes;  No vertigo or throat pain   NECK: No pain or stiffness  RESPIRATORY: No cough, wheezing, hemoptysis; No shortness of breath  CARDIOVASCULAR: No chest pain or palpitations  GASTROINTESTINAL: No abdominal or epigastric pain. No nausea, vomiting, or hematemesis; No diarrhea or constipation. No melena or hematochezia.  GENITOURINARY: No dysuria, frequency or hematuria  NEUROLOGICAL: No numbness or weakness  SKIN: No itching, burning, rashes, or lesions   All other review of systems is negative unless indicated above.        T(F): 98 (10-01-24 @ 08:16), Max: 98.6 (09-30-24 @ 15:41)  HR: 82 (10-01-24 @ 11:12)  BP: 105/71 (10-01-24 @ 11:12)  RR: 18 (10-01-24 @ 08:16)  SpO2: 97% (10-01-24 @ 08:16)      GENERAL: NAD, well-developed  HEAD:  Atraumatic, Normocephalic  EYES: EOMI, PERRLA, conjunctiva and sclera clear  NECK: Supple, No JVD  CHEST/LUNG: +crackles L>R, No wheeze  HEART: Regular rate and rhythm; No murmurs, rubs, or gallops  ABDOMEN: mod distended, no tenderness; Bowel sounds present  EXTREMITIES: 2+ pitting edema noted b/l, 2+ Peripheral Pulses, No clubbing, cyanosis  NEUROLOGY: non-focal  SKIN: No rashes or lesions                          9.5    8.86  )-----------( 445      ( 01 Oct 2024 07:45 )             28.6       10-01    136  |  95[L]  |  9[L]  ----------------------------<  106[H]  3.3[L]   |  29  |  0.7    Ca    8.6      01 Oct 2024 07:45  Mg     1.9     10-01    TPro  6.8  /  Alb  3.4[L]  /  TBili  0.5  /  DBili  x   /  AST  58[H]  /  ALT  25  /  AlkPhos  324[H]  10-01      Magnesium: 1.9 mg/dL (10-01 @ 07:45)        ACC: [id number] EXAM:  CT ABDOMEN AND PELVIS OC IC   ORDERED BY: MARIANA MCQUEEN     PROCEDURE DATE:  09/30/2024          INTERPRETATION:  CLINICAL INFORMATION: Evaluate for abscess    COMPARISON: CT scan from August 15, 2024    CONTRAST/COMPLICATIONS:  IV Contrast: Omnipaque 350  95 cc administered   5 cc discarded  Oral Contrast: NONE  Complications: None reported at time of study completion    TECHNIQUE: Contiguous axial CT images were obtained from the thoracic   inlet to the pubic symphysis Oral contrast was given. Reformatted images   in the coronal and sagittal planes and MIP images were also obtained    Findings:    Lower chest:    There are moderate bilateral pleural effusions with compressive   atelectasis. Numerous nodules within the visualized lungs compatible with   metastatic disease. Pericardial fluid is noted. Partially imaged right   hilar lymph node measuring up to 1.1 cm.    HEPATOBILIARY: Interval liver masses throughout the liver parenchyma   which appear to have progressed from prior exam. Portal vein appears   patent. There is perihepatic ascites.    SPLEEN: Unremarkable.    PANCREAS: Within the anterior aspect of the pancreatic head is a 2.1 cm   hypoattenuating structure which may reflect pancreatic mass or metastatic   implant.    ADRENAL GLANDS: Unremarkable.    KIDNEYS: Both kidneys demonstrate symmetric enhancement with no   hydronephrosis. 2 mm nonobstructing stone midpole right kidney.    ABDOMINOPELVIC NODES: On series 3 image 45 is a positioning millimeter   nodule adjacent to the celiac axis. Other scattered mesenteric lymph   nodes are noted. There are bilateral inguinal lymph nodes. The largest on   the left measures up to 1.2 cm. Right obturator lymph node measures 1.7 x   1.1 cm. Mildly prominent lymph nodes seen adjacent to the femoral   arteries.    PELVIC ORGANS: Poorly distended urinary bladder.    BOWEL/PERITONEUM/MESENTERY:  Masslike mesenteric nodularity again seen anteroinferior to the liver.   Masslike mesenteric nodularity also again noted.    Cecum appears be infiltrated by a large mass. Apparent involvement of the   terminal ileum. Numerous mesenteric masses are noted adjacent to the   colon. This measures up to 2.3 cm. There is no bowel obstruction. There   is centralization of bowel loops. This is secondary to an moderate to   large amount of ascites.    Osseous structures: Multilevel degenerative changes.    There is prominence of the common femoral veins bilaterally, right   greater than left.    Other: No enhancing abscesses demonstrated. Aorta normal in caliber.   Atherosclerotic changes noted.    IMPRESSION:    No enhancing abscess.    Extensive metastatic disease within the lungs and liver. Adenopathy.   Carcinomatosis with mesenteric nodularity throughout. Moderate to large   amount of ascites.    Infiltrative soft tissue mass surrounding the cecum. Involvement of the   terminal ileum. Adjacent numerous mesenteric masses/lymph nodes.    Moderate pleural effusions with compressive atelectasis.    Prominence of the common femoral veins bilaterally. Question clot. DVT   study recommended.    --- End of Report ---        DEEPAK ENGLAND MD; Attending Radiologist  This document has been electronically signed. Sep 30 2024  2:41PM  
INTERVENTIONAL RADIOLOGY CONSULT:     Procedure Requested: paracentesis     HPI:  64 year old man with a past medical history of Stage IV colon cancer (follows with Dr. Solo), HTN , DM, presented for fever of 1 day duration. Patient reported that he developed fever od 38.2 today and was having severe chills all over his body which prompted him to present to the ED. Patient reported that has no other symptoms such as sob, chest pain, bowel habit changes, urinary habit changes or rashes. He also denied sore throat or nasal congestion . Patient started last week on Capecitabine, part of his scheduled CAPOX plus GENET regimen. He denied coming in contact with a sick person. He did not travel    in the ED:   T: 38.5     HR: 120    BP: 134/77     RR: 19    Spo2: 100% on room air  EKG: sinus tachycardia  CXR: No infiltrates     UA negative   Lac: 2.3    Patient was given 2.4 LR bolus in the ED (HR corrected) , Vancomycin and cefepime (30 Sep 2024 03:00)      PAST MEDICAL & SURGICAL HISTORY:  DM (diabetes mellitus)      Obesity      High cholesterol      HTN (hypertension)      Colon cancer      Ascites      No significant past surgical history          MEDICATIONS  (STANDING):  aspirin  chewable 81 milliGRAM(s) Oral daily  atorvastatin 40 milliGRAM(s) Oral at bedtime  chlorhexidine 2% Cloths 1 Application(s) Topical <User Schedule>  dextrose 5%. 1000 milliLiter(s) (50 mL/Hr) IV Continuous <Continuous>  dextrose 5%. 1000 milliLiter(s) (100 mL/Hr) IV Continuous <Continuous>  dextrose 50% Injectable 12.5 Gram(s) IV Push once  dextrose 50% Injectable 25 Gram(s) IV Push once  dextrose 50% Injectable 25 Gram(s) IV Push once  dronabinol 5 milliGRAM(s) Oral daily  enoxaparin Injectable 40 milliGRAM(s) SubCutaneous every 24 hours  furosemide    Tablet 40 milliGRAM(s) Oral daily  glucagon  Injectable 1 milliGRAM(s) IntraMuscular once  hydrochlorothiazide 12.5 milliGRAM(s) Oral daily  insulin lispro (ADMELOG) corrective regimen sliding scale   SubCutaneous three times a day before meals  NIFEdipine XL 90 milliGRAM(s) Oral daily  piperacillin/tazobactam IVPB.. 3.375 Gram(s) IV Intermittent every 8 hours  polyethylene glycol 3350 17 Gram(s) Oral two times a day  potassium chloride   Powder 40 milliEquivalent(s) Oral every 2 hours  senna 2 Tablet(s) Oral at bedtime    MEDICATIONS  (PRN):  acetaminophen     Tablet .. 650 milliGRAM(s) Oral every 6 hours PRN Temp greater or equal to 38C (100.4F), Mild Pain (1 - 3)  benzonatate 100 milliGRAM(s) Oral every 8 hours PRN Cough  dextrose Oral Gel 15 Gram(s) Oral once PRN Blood Glucose LESS THAN 70 milliGRAM(s)/deciliter      Allergies    No Known Allergies    Intolerances      Physical Exam:   Vital Signs Last 24 Hrs  T(C): 36.7 (01 Oct 2024 08:16), Max: 37 (30 Sep 2024 15:41)  T(F): 98 (01 Oct 2024 08:16), Max: 98.6 (30 Sep 2024 15:41)  HR: 82 (01 Oct 2024 11:12) (82 - 105)  BP: 105/71 (01 Oct 2024 11:12) (105/71 - 135/81)  BP(mean): --  RR: 18 (01 Oct 2024 08:16) (16 - 19)  SpO2: 97% (01 Oct 2024 08:16) (96% - 98%)    Parameters below as of 01 Oct 2024 08:16  Patient On (Oxygen Delivery Method): room air    Labs:                         9.5    8.86  )-----------( 445      ( 01 Oct 2024 07:45 )             28.6     10-01    136  |  95[L]  |  9[L]  ----------------------------<  106[H]  3.3[L]   |  29  |  0.7    Ca    8.6      01 Oct 2024 07:45  Mg     1.9     10-01    TPro  6.8  /  Alb  3.4[L]  /  TBili  0.5  /  DBili  x   /  AST  58[H]  /  ALT  25  /  AlkPhos  324[H]  10-01    PT/INR - ( 29 Sep 2024 22:50 )   PT: 15.10 sec;   INR: 1.32 ratio         PTT - ( 29 Sep 2024 22:50 )  PTT:26.7 sec    Pertinent labs:                      9.5    8.86  )-----------( 445      ( 01 Oct 2024 07:45 )             28.6       10-01    136  |  95[L]  |  9[L]  ----------------------------<  106[H]  3.3[L]   |  29  |  0.7    Ca    8.6      01 Oct 2024 07:45  Mg     1.9     10-01    TPro  6.8  /  Alb  3.4[L]  /  TBili  0.5  /  DBili  x   /  AST  58[H]  /  ALT  25  /  AlkPhos  324[H]  10-01      PT/INR - ( 29 Sep 2024 22:50 )   PT: 15.10 sec;   INR: 1.32 ratio         PTT - ( 29 Sep 2024 22:50 )  PTT:26.7 sec    Radiology & Additional Studies:     IMPRESSION:    No enhancing abscess.    Extensive metastatic disease within the lungs and liver. Adenopathy.   Carcinomatosis with mesenteric nodularity throughout. Moderate to large   amount of ascites.    Infiltrative soft tissue mass surrounding the cecum. Involvement of the   terminal ileum. Adjacent numerous mesenteric masses/lymph nodes.    Moderate pleural effusions with compressive atelectasis.    Prominence of the common femoral veins bilaterally. Question clot. DVT   study recommended.    --- End of Report ---      Radiology imaging reviewed.       ASSESSMENT/ PLAN:   64 year old man with a past medical history of Stage IV colon cancer (follows with Dr. Solo), HTN , DM, presented for fever of 1 day duration. Patient reported that he developed fever od 38.2 today and was having severe chills all over his body which prompted him to present to the ED. CT confirms moderate ascites. IR consulted for paracentesis.  - patient on ASA 81mg, please hold 3 doses prior to procedure  - plan for paracentesis Friday   - repeat CBC, CMP, PT/INR prior to procedure    To contact Interventional Radiology with any questions, concerns or issues please utilize the following:  M-F 7am-5pm: [facility] on teams,  consult spectra: [phone]  After hours/weekends/holidays: [phone]

## 2024-10-01 NOTE — CONSULT NOTE ADULT - ASSESSMENT
64 year old man with a past medical history of Stage IV colon cancer (follows with Dr. Solo), HTN , DM, presented for fever of 1 day duration. Heme/onc consulted for further continuation of chemo management.    #Fever-no clear source- No neutropenia/blood cxs neg at 24hr  #Colon cancer Stage IV with mets to liver, peritoneum and lungs.  #Patient recently started on Capecitabine last week 9/23/24; last dose on 9/29/24; s/p Oxaliplatin and Zirabev on 9/23/24    - s/p partial resection of polyp via EMR in 08/2021, pathology showed tubular adenoma  - CT abdomen/pelvis (08/2024): Mild hepatomegaly, with various liver lesions. Largest lesion localized to right hepatic lobe measuring 8 cm in diameter. Also, 8.8 cm segment of abnormal proximal colon which revealed marked circumferential wall thickening raising suspicion for annular constricting lesions. It also showed severe, asymmetric loculated ascites with evidence of carcinomatosis  - Patient had a paracentesis for ascites back in early September that did not grow bacteria.  - Patient is schd to f/u with Dr. Patel on 10/15/24 and IV chemo on 10/16/24.      - CT abdomen/pelvis with IV contrast (9/30/24): No enhancing abscess. Extensive metastatic disease within the lungs and liver. Adenopathy. Carcinomatosis with mesenteric nodularity throughout. Moderate to large amount of ascites. Infiltrative soft tissue mass surrounding the cecum. Involvement of the terminal ileum. Adjacent numerous mesenteric masses/lymph nodes. Moderate pleural effusions with compressive atelectasis. Prominence of the common femoral veins bilaterally. Questionable clot. DVT study recommended.  - f/u duplex  - IR consulted for possible drainage-> will do paracentesis on Friday 10/4, holding aspirin until then per IR request     #DVT prophylaxis: lovenox    Recs:    64 year old man with a past medical history of Stage IV colon cancer (follows with Dr. Solo), HTN , DM, presented for fever of 1 day duration. Heme/onc consulted for further continuation of chemo management.    #Fever-no clear source- No neutropenia/blood cxs neg at 24hr  #Colon cancer Stage IV with mets to liver, peritoneum and lungs.  #Patient recently started on Capecitabine last week 9/23/24; last dose on 9/29/24; s/p Oxaliplatin and Zirabev on 9/23/24  today's cbc: 8.86>9.5/28.6<445    - s/p partial resection of polyp via EMR in 08/2021, pathology showed tubular adenoma  - CT abdomen/pelvis (08/2024): Mild hepatomegaly, with various liver lesions. Largest lesion localized to right hepatic lobe measuring 8 cm in diameter. Also, 8.8 cm segment of abnormal proximal colon which revealed marked circumferential wall thickening raising suspicion for annular constricting lesions. It also showed severe, asymmetric loculated ascites with evidence of carcinomatosis  - Patient had a paracentesis for ascites back in early September that did not grow bacteria.  - Patient is schd to f/u with Dr. Patel on 10/15/24 and IV chemo on 10/16/24.      - CT abdomen/pelvis with IV contrast (9/30/24): No enhancing abscess. Extensive metastatic disease within the lungs and liver. Adenopathy. Carcinomatosis with mesenteric nodularity throughout. Moderate to large amount of ascites. Infiltrative soft tissue mass surrounding the cecum. Involvement of the terminal ileum. Adjacent numerous mesenteric masses/lymph nodes. Moderate pleural effusions with compressive atelectasis. Prominence of the common femoral veins bilaterally. Questionable clot. DVT study recommended.  - f/u duplex  - IR consulted for possible drainage-> will do paracentesis on Friday 10/4, holding aspirin until then per IR request     #DVT prophylaxis: lovenox    Recs:    64 year old man with a past medical history of Stage IV colon cancer (follows with Dr. Solo), HTN , DM, presented for fever of 1 day duration. Heme/onc consulted for further continuation of chemo management.    #Fever-no clear source- No neutropenia/blood cxs neg at 24hr  #Colon cancer Stage IV with mets to liver, peritoneum and lungs.  #Patient recently started on Capecitabine last week 9/23/24; last dose on 9/29/24; s/p Oxaliplatin and Zirabev on 9/23/24  today's cbc: 8.86>9.5/28.6<445    - s/p partial resection of polyp via EMR in 08/2021, pathology showed tubular adenoma  - CT abdomen/pelvis (08/2024): Mild hepatomegaly, with various liver lesions. Largest lesion localized to right hepatic lobe measuring 8 cm in diameter. Also, 8.8 cm segment of abnormal proximal colon which revealed marked circumferential wall thickening raising suspicion for annular constricting lesions. It also showed severe, asymmetric loculated ascites with evidence of carcinomatosis  - Patient had a paracentesis for ascites back in early September that did not grow bacteria.  - Patient is schd to f/u with Dr. Patel on 10/15/24 and IV chemo on 10/16/24.      - CT abdomen/pelvis with IV contrast (9/30/24): No enhancing abscess. Extensive metastatic disease within the lungs and liver. Adenopathy. Carcinomatosis with mesenteric nodularity throughout. Moderate to large amount of ascites. Infiltrative soft tissue mass surrounding the cecum. Involvement of the terminal ileum. Adjacent numerous mesenteric masses/lymph nodes. Moderate pleural effusions with compressive atelectasis. Prominence of the common femoral veins bilaterally. Questionable clot. DVT study recommended.  - f/u duplex  - IR consulted for possible drainage-> will do paracentesis on Friday 10/4, holding aspirin until then per IR request     #DVT prophylaxis: lovenox    Recs:   No more fever, currently on zosyn  Ok to resume capecetabine tomorrow if no more fever, will continue capecetabine for 5 more days and then will be 1 week off before starting second cycle of chemotherapy outpatient.  Follow up doppler

## 2024-10-02 ENCOUNTER — TRANSCRIPTION ENCOUNTER (OUTPATIENT)
Age: 64
End: 2024-10-02

## 2024-10-02 LAB
ALBUMIN SERPL ELPH-MCNC: 3.2 G/DL — LOW (ref 3.5–5.2)
ALP SERPL-CCNC: 340 U/L — HIGH (ref 30–115)
ALT FLD-CCNC: 24 U/L — SIGNIFICANT CHANGE UP (ref 0–41)
ANION GAP SERPL CALC-SCNC: 11 MMOL/L — SIGNIFICANT CHANGE UP (ref 7–14)
APTT BLD: 29.8 SEC — SIGNIFICANT CHANGE UP (ref 27–39.2)
AST SERPL-CCNC: 57 U/L — HIGH (ref 0–41)
BASOPHILS # BLD AUTO: 0.03 K/UL — SIGNIFICANT CHANGE UP (ref 0–0.2)
BASOPHILS NFR BLD AUTO: 0.4 % — SIGNIFICANT CHANGE UP (ref 0–1)
BILIRUB SERPL-MCNC: 0.5 MG/DL — SIGNIFICANT CHANGE UP (ref 0.2–1.2)
BUN SERPL-MCNC: 9 MG/DL — LOW (ref 10–20)
CALCIUM SERPL-MCNC: 8.8 MG/DL — SIGNIFICANT CHANGE UP (ref 8.4–10.5)
CHLORIDE SERPL-SCNC: 98 MMOL/L — SIGNIFICANT CHANGE UP (ref 98–110)
CO2 SERPL-SCNC: 30 MMOL/L — SIGNIFICANT CHANGE UP (ref 17–32)
CREAT SERPL-MCNC: 0.8 MG/DL — SIGNIFICANT CHANGE UP (ref 0.7–1.5)
CULTURE RESULTS: NO GROWTH — SIGNIFICANT CHANGE UP
EGFR: 98 ML/MIN/1.73M2 — SIGNIFICANT CHANGE UP
EOSINOPHIL # BLD AUTO: 0.03 K/UL — SIGNIFICANT CHANGE UP (ref 0–0.7)
EOSINOPHIL NFR BLD AUTO: 0.4 % — SIGNIFICANT CHANGE UP (ref 0–8)
GLUCOSE BLDC GLUCOMTR-MCNC: 100 MG/DL — HIGH (ref 70–99)
GLUCOSE BLDC GLUCOMTR-MCNC: 102 MG/DL — HIGH (ref 70–99)
GLUCOSE BLDC GLUCOMTR-MCNC: 113 MG/DL — HIGH (ref 70–99)
GLUCOSE BLDC GLUCOMTR-MCNC: 115 MG/DL — HIGH (ref 70–99)
GLUCOSE SERPL-MCNC: 76 MG/DL — SIGNIFICANT CHANGE UP (ref 70–99)
HCT VFR BLD CALC: 26.7 % — LOW (ref 42–52)
HGB BLD-MCNC: 8.9 G/DL — LOW (ref 14–18)
IMM GRANULOCYTES NFR BLD AUTO: 0.5 % — HIGH (ref 0.1–0.3)
INR BLD: 1.3 RATIO — SIGNIFICANT CHANGE UP (ref 0.65–1.3)
LYMPHOCYTES # BLD AUTO: 1.03 K/UL — LOW (ref 1.2–3.4)
LYMPHOCYTES # BLD AUTO: 12.2 % — LOW (ref 20.5–51.1)
MAGNESIUM SERPL-MCNC: 1.9 MG/DL — SIGNIFICANT CHANGE UP (ref 1.8–2.4)
MCHC RBC-ENTMCNC: 27.3 PG — SIGNIFICANT CHANGE UP (ref 27–31)
MCHC RBC-ENTMCNC: 33.3 G/DL — SIGNIFICANT CHANGE UP (ref 32–37)
MCV RBC AUTO: 81.9 FL — SIGNIFICANT CHANGE UP (ref 80–94)
MONOCYTES # BLD AUTO: 1.24 K/UL — HIGH (ref 0.1–0.6)
MONOCYTES NFR BLD AUTO: 14.7 % — HIGH (ref 1.7–9.3)
NEUTROPHILS # BLD AUTO: 6.09 K/UL — SIGNIFICANT CHANGE UP (ref 1.4–6.5)
NEUTROPHILS NFR BLD AUTO: 71.8 % — SIGNIFICANT CHANGE UP (ref 42.2–75.2)
NRBC # BLD: 0 /100 WBCS — SIGNIFICANT CHANGE UP (ref 0–0)
PHOSPHATE SERPL-MCNC: 2.9 MG/DL — SIGNIFICANT CHANGE UP (ref 2.1–4.9)
PLATELET # BLD AUTO: 435 K/UL — HIGH (ref 130–400)
PMV BLD: 10.1 FL — SIGNIFICANT CHANGE UP (ref 7.4–10.4)
POTASSIUM SERPL-MCNC: 3.4 MMOL/L — LOW (ref 3.5–5)
POTASSIUM SERPL-SCNC: 3.4 MMOL/L — LOW (ref 3.5–5)
PROT SERPL-MCNC: 6.7 G/DL — SIGNIFICANT CHANGE UP (ref 6–8)
PROTHROM AB SERPL-ACNC: 14.9 SEC — HIGH (ref 9.95–12.87)
RBC # BLD: 3.26 M/UL — LOW (ref 4.7–6.1)
RBC # FLD: 17.1 % — HIGH (ref 11.5–14.5)
SODIUM SERPL-SCNC: 139 MMOL/L — SIGNIFICANT CHANGE UP (ref 135–146)
SPECIMEN SOURCE: SIGNIFICANT CHANGE UP
WBC # BLD: 8.46 K/UL — SIGNIFICANT CHANGE UP (ref 4.8–10.8)
WBC # FLD AUTO: 8.46 K/UL — SIGNIFICANT CHANGE UP (ref 4.8–10.8)

## 2024-10-02 PROCEDURE — 99232 SBSQ HOSP IP/OBS MODERATE 35: CPT

## 2024-10-02 RX ORDER — PIPERACILLIN SODIUM AND TAZOBACTAM SODIUM 12; 1.5 G/60ML; G/60ML
3.38 INJECTION, POWDER, LYOPHILIZED, FOR SOLUTION INTRAVENOUS ONCE
Refills: 0 | Status: COMPLETED | OUTPATIENT
Start: 2024-10-02 | End: 2024-10-02

## 2024-10-02 RX ORDER — CAPECITABINE 150 MG/1
2000 TABLET, FILM COATED ORAL
Refills: 0 | Status: DISCONTINUED | OUTPATIENT
Start: 2024-10-02 | End: 2024-10-04

## 2024-10-02 RX ORDER — PIPERACILLIN SODIUM AND TAZOBACTAM SODIUM 12; 1.5 G/60ML; G/60ML
3.38 INJECTION, POWDER, LYOPHILIZED, FOR SOLUTION INTRAVENOUS EVERY 8 HOURS
Refills: 0 | Status: DISCONTINUED | OUTPATIENT
Start: 2024-10-03 | End: 2024-10-04

## 2024-10-02 RX ORDER — PIPERACILLIN SODIUM AND TAZOBACTAM SODIUM 12; 1.5 G/60ML; G/60ML
3.38 INJECTION, POWDER, LYOPHILIZED, FOR SOLUTION INTRAVENOUS ONCE
Refills: 0 | Status: COMPLETED | OUTPATIENT
Start: 2024-10-03 | End: 2024-10-03

## 2024-10-02 RX ADMIN — Medication 0: at 11:48

## 2024-10-02 RX ADMIN — FUROSEMIDE 40 MILLIGRAM(S): 10 INJECTION INTRAVENOUS at 05:45

## 2024-10-02 RX ADMIN — PIPERACILLIN SODIUM AND TAZOBACTAM SODIUM 25 GRAM(S): 12; 1.5 INJECTION, POWDER, LYOPHILIZED, FOR SOLUTION INTRAVENOUS at 05:44

## 2024-10-02 RX ADMIN — Medication 17 GRAM(S): at 05:46

## 2024-10-02 RX ADMIN — Medication 40 MILLIEQUIVALENT(S): at 09:57

## 2024-10-02 RX ADMIN — Medication 90 MILLIGRAM(S): at 05:45

## 2024-10-02 RX ADMIN — Medication 17 GRAM(S): at 05:53

## 2024-10-02 RX ADMIN — PIPERACILLIN SODIUM AND TAZOBACTAM SODIUM 25 GRAM(S): 12; 1.5 INJECTION, POWDER, LYOPHILIZED, FOR SOLUTION INTRAVENOUS at 18:16

## 2024-10-02 RX ADMIN — DRONABINOL 5 MILLIGRAM(S): 5 CAPSULE ORAL at 05:45

## 2024-10-02 RX ADMIN — CAPECITABINE 2000 MILLIGRAM(S): 150 TABLET, FILM COATED ORAL at 18:14

## 2024-10-02 RX ADMIN — ATORVASTATIN CALCIUM 40 MILLIGRAM(S): 10 TABLET, FILM COATED ORAL at 21:50

## 2024-10-02 RX ADMIN — Medication 40 MILLIEQUIVALENT(S): at 13:03

## 2024-10-02 RX ADMIN — PIPERACILLIN SODIUM AND TAZOBACTAM SODIUM 3.38 GRAM(S): 12; 1.5 INJECTION, POWDER, LYOPHILIZED, FOR SOLUTION INTRAVENOUS at 18:26

## 2024-10-02 RX ADMIN — Medication 0: at 07:53

## 2024-10-02 NOTE — DIETITIAN INITIAL EVALUATION ADULT - PERTINENT MEDS FT
MEDICATIONS  (STANDING):  atorvastatin 40 milliGRAM(s) Oral at bedtime  chlorhexidine 2% Cloths 1 Application(s) Topical <User Schedule>  dextrose 5%. 1000 milliLiter(s) (100 mL/Hr) IV Continuous <Continuous>  dextrose 5%. 1000 milliLiter(s) (50 mL/Hr) IV Continuous <Continuous>  dextrose 50% Injectable 12.5 Gram(s) IV Push once  dextrose 50% Injectable 25 Gram(s) IV Push once  dextrose 50% Injectable 25 Gram(s) IV Push once  dronabinol 5 milliGRAM(s) Oral daily  enoxaparin Injectable 40 milliGRAM(s) SubCutaneous every 24 hours  furosemide    Tablet 40 milliGRAM(s) Oral daily  glucagon  Injectable 1 milliGRAM(s) IntraMuscular once  hydrochlorothiazide 12.5 milliGRAM(s) Oral daily  insulin lispro (ADMELOG) corrective regimen sliding scale   SubCutaneous three times a day before meals  NIFEdipine XL 90 milliGRAM(s) Oral daily  polyethylene glycol 3350 17 Gram(s) Oral two times a day  potassium chloride   Powder 40 milliEquivalent(s) Oral every 4 hours  senna 2 Tablet(s) Oral at bedtime    MEDICATIONS  (PRN):  acetaminophen     Tablet .. 650 milliGRAM(s) Oral every 6 hours PRN Temp greater or equal to 38C (100.4F), Mild Pain (1 - 3)  benzonatate 100 milliGRAM(s) Oral every 8 hours PRN Cough  dextrose Oral Gel 15 Gram(s) Oral once PRN Blood Glucose LESS THAN 70 milliGRAM(s)/deciliter

## 2024-10-02 NOTE — DIETITIAN NUTRITION RISK NOTIFICATION - TREATMENT: THE FOLLOWING DIET HAS BEEN RECOMMENDED
Diet, DASH/TLC:   Sodium & Cholesterol Restricted  Consistent Carbohydrate {No Snacks} (CSTCHO)  Supplement Feeding Modality:  Oral  Ensure Max Cans or Servings Per Day:  1       Frequency:  Three Times a day (10-02-24 @ 12:03) [Pending Verification By Attending]    Patient with severe malnutrition in the context of chronic illness as evidenced by energy intake </=75% of estimated energy requirement for >/=1 month and weight loss >5% x 1 month

## 2024-10-02 NOTE — DISCHARGE NOTE PROVIDER - DETAILS OF MALNUTRITION DIAGNOSIS/DIAGNOSES
This patient has been assessed with a concern for Malnutrition and was treated during this hospitalization for the following Nutrition diagnosis/diagnoses:     -  10/02/2024: Severe protein-calorie malnutrition

## 2024-10-02 NOTE — PROGRESS NOTE ADULT - ASSESSMENT
ASSESSMENT  64y Male with a PMH of Stage IV colon cancer, HTN , and DM presented with a one day history of fever at home and chills, patient recently started on capecitabine as part of his chemotherapy regimen of CAPOX and GENET. Patient denies fevers, shortness of breath, chest pain, headache, or malaise while in the ED, Xray shows bilateral pleural effusions, lactate 1.4 from 2.4 and wbc 10.54.    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   Doubt infectious etiology of SIRS but will for now continue with Abx as feves have resolved  Heavy tumor burden with extensive liver mets  SIRS secondary to recent CT and tumor burden  No evidence of cholangitis  RLL with atelectatic changes  9/30 CT with b/l pleural effusions and atelectasis RLL  WBC 8.8  9/30 CXR left pleural effusion ( reviewed )  9/29 BCX NG  Nares ORSA NG  RVP NG  COVID 19/ Influenza/ RSV NG.     # Stage IV colon cancer  #HTN  #DM  #Hepatomegaly      RECOMMENDATIONS  -zosyn 3.375g IV q8   -paracentesis scheduled for friday. Continue with Zosyn tlll then

## 2024-10-02 NOTE — DIETITIAN INITIAL EVALUATION ADULT - OTHER CALCULATIONS
Energy: 2028 - 2434 kcal/day (MSJ 2028.998 x 1-1.2 SF)   Protein: 97 - 121 gm/day (1.2 - 1.5 gm/kg IBW 80.9 kg)   Fluid: 1 mL/kcal  estimated needs with consideration for age, BMI, hx of stage IV colon cancer with adenopathy, carcinomatosis with mesenteric nodularity

## 2024-10-02 NOTE — DISCHARGE NOTE PROVIDER - NSDCCPCAREPLAN_GEN_ALL_CORE_FT
PRINCIPAL DISCHARGE DIAGNOSIS  Diagnosis: Sepsis  Assessment and Plan of Treatment: You came into the ED because you noted you were having fevers. You were admitted for further management of this. You were seen by IR, heme/ onc and ID. You were given antibiotics during your stay. You are to follow up with IR outpatient for a para. You can begin taking your chemo today per heme/onc. No source of fever found and it is likely due to chemo.      SECONDARY DISCHARGE DIAGNOSES  Diagnosis: Anemia, mild  Assessment and Plan of Treatment:     Diagnosis: Bilateral pleural effusion  Assessment and Plan of Treatment:     Diagnosis: Immunocompromised patient  Assessment and Plan of Treatment:     Diagnosis: Hypokalemia  Assessment and Plan of Treatment:      PRINCIPAL DISCHARGE DIAGNOSIS  Diagnosis: Sepsis  Assessment and Plan of Treatment: You came into the ED because you noted you were having fevers. You were admitted for further management of this. You were seen by IR, heme/ onc, and ID to help with management of you condition.  You were given antibiotics during your stay. It is not needed to take any abx once discharged.  Today you got a para from IR . You were cleared to resume your chemo regimen per the heme/onc team.  Today you are deemed stable for discharge. If you ever feel like you did that brought you into the ED please seek out medical attention immediately.      SECONDARY DISCHARGE DIAGNOSES  Diagnosis: Anemia, mild  Assessment and Plan of Treatment:     Diagnosis: Bilateral pleural effusion  Assessment and Plan of Treatment:     Diagnosis: Immunocompromised patient  Assessment and Plan of Treatment:     Diagnosis: Hypokalemia  Assessment and Plan of Treatment:

## 2024-10-02 NOTE — DIETITIAN INITIAL EVALUATION ADULT - NAME AND PHONE
Sera Quiles, RD [phone] or via Teams    Patient is at high nutrition risk, RD to f/u in 3 days or PRN

## 2024-10-02 NOTE — DISCHARGE NOTE PROVIDER - NSDCMRMEDTOKEN_GEN_ALL_CORE_FT
aspirin 81 mg oral tablet: orally once a day  hydroCHLOROthiazide 12.5 mg oral tablet: 1 tab(s) orally once a day  Janumet 50 mg-500 mg oral tablet: 1 tab(s) orally 2 times a day  naproxen 500 mg oral tablet: 1 tab(s) orally as needed for  severe pain  NIFEdipine 90 mg oral tablet, extended release: 1 tab(s) orally once a day  pioglitazone: once a day  simvastatin 40 mg oral tablet: 1 tab(s) orally once a day (at bedtime)   aspirin 81 mg oral tablet: orally once a day  benzonatate 100 mg oral capsule: 1 cap(s) orally every 8 hours as needed for  cough take as needed, can take once every 8 hours as needed  furosemide 40 mg oral tablet: 1 tab(s) orally once a day can stop taking med if feel dehydrated  hydroCHLOROthiazide 12.5 mg oral tablet: 1 tab(s) orally once a day  Janumet 50 mg-500 mg oral tablet: 1 tab(s) orally 2 times a day  naproxen 500 mg oral tablet: 1 tab(s) orally as needed for  severe pain  NIFEdipine 90 mg oral tablet, extended release: 1 tab(s) orally once a day  pioglitazone: once a day  simvastatin 40 mg oral tablet: 1 tab(s) orally once a day (at bedtime)

## 2024-10-02 NOTE — DIETITIAN INITIAL EVALUATION ADULT - PERTINENT LABORATORY DATA
10-02    139  |  98  |  9[L]  ----------------------------<  76  3.4[L]   |  30  |  0.8    Ca    8.8      02 Oct 2024 07:22  Phos  2.9     10-02  Mg     1.9     10-02    TPro  6.7  /  Alb  3.2[L]  /  TBili  0.5  /  DBili  x   /  AST  57[H]  /  ALT  24  /  AlkPhos  340[H]  10-02  POCT Blood Glucose.: 115 mg/dL (10-02-24 @ 11:15)  A1C with Estimated Average Glucose Result: 5.9 % (09-30-24 @ 08:51)

## 2024-10-02 NOTE — DISCHARGE NOTE PROVIDER - NSDCFUSCHEDAPPT_GEN_ALL_CORE_FT
Oleksandr Patel  [facility]  Scheduled Appointment: 10/15/2024    Oleksandr Patel  [facility]  [facility]  [address]  Scheduled Appointment: 10/15/2024    [facility]  [facility]  [address]  Scheduled Appointment: 10/16/2024    Oleksandr Patel  [facility]  Scheduled Appointment: 10/16/2024

## 2024-10-02 NOTE — DISCHARGE NOTE PROVIDER - CARE PROVIDER_API CALL
Eliud Will [facility]  Interventional Radiology and Diagnostic Radiology  [address] [place] [id number]  Phone: [phone]  Fax: [phone]  Follow Up Time: 1-3 days

## 2024-10-02 NOTE — PROGRESS NOTE ADULT - ASSESSMENT
64 year old man with a past medical history of Stage IV colon cancer (follows with Dr. Solo), HTN , DM, presented for fever of 1 day duration    #Fever  zosyn     #Colon cancer Stage IV withAdenopathy. Carcinomatosis with            mesenteric nodularity throughout.   on  Capecitabine ng abscess  paracentesis planned for Friday     #HTN    #DM  POCT Blood Glucose.: 102 mg/dL (02 Oct 2024 07:46)  POCT Blood Glucose.: 119 mg/dL (01 Oct 2024 20:53)  POCT Blood Glucose.: 98 mg/dL (01 Oct 2024 16:39)  POCT Blood Glucose.: 104 mg/dL (01 Oct 2024 11:14)  controlled       #Full code   #DVT prophylaxis: lovenox 64 year old man with a past medical history of Stage IV colon cancer (follows with Dr. Solo), HTN , DM, presented for fever of 1 day duration    #Fever  zosyn  per ID until paracentesis     #Colon cancer Stage IV withAdenopathy. Carcinomatosis with            mesenteric nodularity throughout.   on  Capecitabine ng abscess  paracentesis planned for Friday     #HTN    #DM  POCT Blood Glucose.: 102 mg/dL (02 Oct 2024 07:46)  POCT Blood Glucose.: 119 mg/dL (01 Oct 2024 20:53)  POCT Blood Glucose.: 98 mg/dL (01 Oct 2024 16:39)  POCT Blood Glucose.: 104 mg/dL (01 Oct 2024 11:14)  controlled       #Full code     #DVT prophylaxis: lovenox    PROGRESS NOTE HANDOFF    Pending: zosyn until Friday with paracentesis     Family discussion: patient verbalized understanding and agreeable to plan of care , discussed with daughter via telephone at bedside     Disposition: Home

## 2024-10-02 NOTE — DIETITIAN INITIAL EVALUATION ADULT - OTHER INFO
Patient is a 64 year old man with a past medical history of Stage IV colon cancer (follows with Dr. Solo), HTN , DM, presented for fever of 1 day duration    Fever; Colon cancer stage IV with adenopathy, carcinomatosis with mesenteric nodularity throughout; DM - blood sugars appear to be controlled    weight hx per EMR:  130.6 kg (8/20/24) vs current dosing weight 120.2 kg (9/29/29)  8.6% weight loss x 1 month which is significant for malnutrition    Patient reports PO intake is slightly improved during current admission

## 2024-10-02 NOTE — DIETITIAN INITIAL EVALUATION ADULT - ORAL INTAKE PTA/DIET HISTORY
Patient reports poor PO intake related to his illness (reports changes in taste) - he was following mainly a diet restricting sugar /carbohydrate intake. He was consuming Ensure Max supplement every other day. NKFA, no food intolerances.

## 2024-10-02 NOTE — DIETITIAN INITIAL EVALUATION ADULT - ADD RECOMMEND
1) Continue current diet order  2) Monitor PO intake - provide assistance as needed  3) Monitor electrolytes, BG, renal profile   4) Monitor BM, GI s/s  1) Continue current diet order - Add Consistent CHO modifier due to hx of DM   2) Monitor PO intake - provide assistance as needed  3) Monitor electrolytes, BG, renal profile   4) Monitor BM, GI s/s

## 2024-10-02 NOTE — DISCHARGE NOTE PROVIDER - HOSPITAL COURSE
64 year old man with a past medical history of Stage IV colon cancer (follows with Dr. Solo), HTN , DM, presented for fever of 1 day duration. Patient reported that he developed fever od 38.2 day before comin to ED and was having severe chills all over his body which prompted him to present to the ED. Patient reported that has no other symptoms such as sob, chest pain, bowel habit changes, urinary habit changes or rashes. He also denied sore throat or nasal congestion . Patient started last week on Capecitabine, part of his scheduled CAPOX plus GENET regimen. He denied coming in contact with a sick person. He did not travel    in the ED:   T: 38.5     HR: 120    BP: 134/77     RR: 19    Spo2: 100% on room air  EKG: sinus tachycardia  CXR: No infiltrates     UA negative   Lac: 2.3    Patient was given 2.4 LR bolus in the ED (HR corrected) , Vancomycin and cefepime.    Patient was admitted for further management. Was given zosyn per ID. No source of fever found. Likely due to chemo. Pt seen by IR who will do tap on friday, which can be done outpatient. Patient also seen by heme/onc and recommended can do chemo today.     A/P    64 year old man with a past medical history of Stage IV colon cancer (follows with Dr. Solo), HTN , DM, presented for fever of 1 day duration    #Fever-no clear source- No neutropenia   #Colon cancer Stage IV  #Patient recently started on  Capecitabine last week  - Patient reported that he developed fever od 38.2 today and was having severe chills all over his body. PE unrevealing. Has a PICC line on the left arm with clean surrounding skin  - Patient started last week on Capecitabine, part of his scheduled CAPOX plus GENET regimen. Follows with Dr. Patel  - Patient had a paracentesis for ascites back in early September that did not grow bacteria.  -EKG: sinus tachycardia  -CXR: No infiltrates   -UA negative    - s/p Vancomycin and cefepime  - s/p on zosyn  - CT abdomen/pelvis with IV contrast:          -No enhancing abscess         -Extensive metastatic disease within the lungs and liver. Adenopathy. Carcinomatosis with            mesenteric nodularity throughout. Moderate to large amount of ascites.          -Infiltrative soft tissue mass surrounding the cecum. Involvement of the terminal ileum.    Adjacent numerous mesenteric masses/lymph nodes.          -Moderate pleural effusions with compressive atelectasis.           -Prominence of the common femoral veins bilaterally. Question clot. DVT study recommended.  - f/u duplex-> neg  - IR consulted for possible drainage-> will do para friday 10/4, holding aspirin until then per IR request  - heme onc following -> can do chemo today 10/2    #HTN  #DM  - Patient on home HCTZ 12.5, Nifedipine 60 mg ER, Januvia, and Pioglitzanoe  - ISS      Discussion of discharge plan of care, including discharge diagnoses, med rec, and follow ups was conducted by Dr. Hagan on 10/2/24 and dc was approved. 64 year old man with a past medical history of Stage IV colon cancer (follows with Dr. Solo), HTN , DM, presented for fever of 1 day duration. Patient reported that he developed fever od 38.2 day before comin to ED and was having severe chills all over his body which prompted him to present to the ED. Patient reported that has no other symptoms such as sob, chest pain, bowel habit changes, urinary habit changes or rashes. He also denied sore throat or nasal congestion . Patient started last week on Capecitabine, part of his scheduled CAPOX plus GENET regimen. He denied coming in contact with a sick person. He did not travel    in the ED:   T: 38.5     HR: 120    BP: 134/77     RR: 19    Spo2: 100% on room air  EKG: sinus tachycardia  CXR: No infiltrates     UA negative   Lac: 2.3    Patient was given 2.4 LR bolus in the ED (HR corrected) , Vancomycin and cefepime.    Patient was admitted for further management. Was given zosyn per ID. No source of fever found. Likely due to chemo. Pt seen by IR who will do tap on friday, which can be done outpatient. Patient also seen by heme/onc and recommended can do chemo today.     A/P    64 year old man with a past medical history of Stage IV colon cancer (follows with Dr. Solo), HTN , DM, presented for fever of 1 day duration    #Fever-no clear source- No neutropenia   #Colon cancer Stage IV  #Patient recently started on  Capecitabine last week  - Patient reported that he developed fever od 38.2 today and was having severe chills all over his body. PE unrevealing. Has a PICC line on the left arm with clean surrounding skin  - Patient started last week on Capecitabine, part of his scheduled CAPOX plus GENET regimen. Follows with Dr. Ptael  - Patient had a paracentesis for ascites back in early September that did not grow bacteria.  -EKG: sinus tachycardia  -CXR: No infiltrates   -UA negative    - s/p Vancomycin and cefepime  - s/p on zosyn-> no abx needed on dc per ID  - CT abdomen/pelvis with IV contrast:          -No enhancing abscess         -Extensive metastatic disease within the lungs and liver. Adenopathy. Carcinomatosis with            mesenteric nodularity throughout. Moderate to large amount of ascites.          -Infiltrative soft tissue mass surrounding the cecum. Involvement of the terminal ileum.    Adjacent numerous mesenteric masses/lymph nodes.          -Moderate pleural effusions with compressive atelectasis.           -Prominence of the common femoral veins bilaterally. Question clot. DVT study recommended.  - f/u duplex-> neg  - IR consulted for possible drainage-> will do para friday 10/4, holding aspirin until then per IR request  - heme onc following -> did chemo 10/2    #HTN  #DM  - Patient on home HCTZ 12.5, Nifedipine 60 mg ER, Januvia, and Pioglitzanoe  - ISS      Discussion of discharge plan of care, including discharge diagnoses, med rec, and follow ups was conducted by Dr. Hagan on 10/2/24 and dc was approved. 64 year old man with a past medical history of Stage IV colon cancer (follows with Dr. Solo), HTN , DM, presented for fever of 1 day duration. Patient reported that he developed fever od 38.2 day before comin to ED and was having severe chills all over his body which prompted him to present to the ED. Patient reported that has no other symptoms such as sob, chest pain, bowel habit changes, urinary habit changes or rashes. He also denied sore throat or nasal congestion . Patient started last week on Capecitabine, part of his scheduled CAPOX plus GENET regimen. He denied coming in contact with a sick person. He did not travel    in the ED:   T: 38.5     HR: 120    BP: 134/77     RR: 19    Spo2: 100% on room air  EKG: sinus tachycardia  CXR: No infiltrates     UA negative   Lac: 2.3    Patient was given 2.4 LR bolus in the ED (HR corrected) , Vancomycin and cefepime.    Patient was admitted for further management. Was given zosyn per ID during his stay and does not need abx on dc. No source of fever found. Likely due to chemo. Pt was seen by heme/onc and was cleared to do his chemo during his stay. Patient was seen by IR and they preformed a therapeutic para today.    A/P    64 year old man with a past medical history of Stage IV colon cancer (follows with Dr. Solo), HTN , DM, presented for fever of 1 day duration    #Fever-no clear source- No neutropenia   #Colon cancer Stage IV  #Patient recently started on  Capecitabine last week  - Patient reported that he developed fever od 38.2 today and was having severe chills all over his body. PE unrevealing. Has a PICC line on the left arm with clean surrounding skin  - Patient started last week on Capecitabine, part of his scheduled CAPOX plus GENET regimen. Follows with Dr. Patel  - Patient had a paracentesis for ascites back in early September that did not grow bacteria.  -EKG: sinus tachycardia  -CXR: No infiltrates   -UA negative    - s/p Vancomycin and cefepime  - s/p on zosyn-> no abx needed on dc per ID  - CT abdomen/pelvis with IV contrast:          -No enhancing abscess         -Extensive metastatic disease within the lungs and liver. Adenopathy. Carcinomatosis with            mesenteric nodularity throughout. Moderate to large amount of ascites.          -Infiltrative soft tissue mass surrounding the cecum. Involvement of the terminal ileum.    Adjacent numerous mesenteric masses/lymph nodes.          -Moderate pleural effusions with compressive atelectasis.           -Prominence of the common femoral veins bilaterally. Question clot. DVT study recommended.  - f/u duplex-> neg  - IR consulted for possible drainage-> para today 10/4  - heme onc following -> did chemo 10/2    #HTN  #DM  - Patient on home HCTZ 12.5, Nifedipine 60 mg ER, Januvia, and Pioglitzanoe  - ISS      Discussion of discharge plan of care, including discharge diagnoses, med rec, and follow ups was conducted by Dr. Hagan on 10/4/24 and dc was approved.

## 2024-10-02 NOTE — DIETITIAN INITIAL EVALUATION ADULT - ORAL NUTRITION SUPPLEMENTS
Recommend Ensure Max 3x/day (150 kcal, 30 gm Protein, 1 gm Sugar, 6 gm CH) to aid in optimizing kcal and protein intake

## 2024-10-03 LAB
ALBUMIN SERPL ELPH-MCNC: 3.6 G/DL — SIGNIFICANT CHANGE UP (ref 3.5–5.2)
ALP SERPL-CCNC: 361 U/L — HIGH (ref 30–115)
ALT FLD-CCNC: 26 U/L — SIGNIFICANT CHANGE UP (ref 0–41)
ANION GAP SERPL CALC-SCNC: 13 MMOL/L — SIGNIFICANT CHANGE UP (ref 7–14)
AST SERPL-CCNC: 64 U/L — HIGH (ref 0–41)
BASOPHILS # BLD AUTO: 0.02 K/UL — SIGNIFICANT CHANGE UP (ref 0–0.2)
BASOPHILS NFR BLD AUTO: 0.2 % — SIGNIFICANT CHANGE UP (ref 0–1)
BILIRUB SERPL-MCNC: 0.5 MG/DL — SIGNIFICANT CHANGE UP (ref 0.2–1.2)
BUN SERPL-MCNC: 9 MG/DL — LOW (ref 10–20)
CALCIUM SERPL-MCNC: 8.8 MG/DL — SIGNIFICANT CHANGE UP (ref 8.4–10.5)
CHLORIDE SERPL-SCNC: 98 MMOL/L — SIGNIFICANT CHANGE UP (ref 98–110)
CO2 SERPL-SCNC: 29 MMOL/L — SIGNIFICANT CHANGE UP (ref 17–32)
CREAT SERPL-MCNC: 0.9 MG/DL — SIGNIFICANT CHANGE UP (ref 0.7–1.5)
EGFR: 95 ML/MIN/1.73M2 — SIGNIFICANT CHANGE UP
EOSINOPHIL # BLD AUTO: 0.03 K/UL — SIGNIFICANT CHANGE UP (ref 0–0.7)
EOSINOPHIL NFR BLD AUTO: 0.3 % — SIGNIFICANT CHANGE UP (ref 0–8)
GLUCOSE BLDC GLUCOMTR-MCNC: 140 MG/DL — HIGH (ref 70–99)
GLUCOSE BLDC GLUCOMTR-MCNC: 89 MG/DL — SIGNIFICANT CHANGE UP (ref 70–99)
GLUCOSE BLDC GLUCOMTR-MCNC: 97 MG/DL — SIGNIFICANT CHANGE UP (ref 70–99)
GLUCOSE SERPL-MCNC: 82 MG/DL — SIGNIFICANT CHANGE UP (ref 70–99)
HCT VFR BLD CALC: 30 % — LOW (ref 42–52)
HGB BLD-MCNC: 9.7 G/DL — LOW (ref 14–18)
IMM GRANULOCYTES NFR BLD AUTO: 0.3 % — SIGNIFICANT CHANGE UP (ref 0.1–0.3)
LYMPHOCYTES # BLD AUTO: 1.35 K/UL — SIGNIFICANT CHANGE UP (ref 1.2–3.4)
LYMPHOCYTES # BLD AUTO: 13.6 % — LOW (ref 20.5–51.1)
MAGNESIUM SERPL-MCNC: 1.9 MG/DL — SIGNIFICANT CHANGE UP (ref 1.8–2.4)
MCHC RBC-ENTMCNC: 27.1 PG — SIGNIFICANT CHANGE UP (ref 27–31)
MCHC RBC-ENTMCNC: 32.3 G/DL — SIGNIFICANT CHANGE UP (ref 32–37)
MCV RBC AUTO: 83.8 FL — SIGNIFICANT CHANGE UP (ref 80–94)
MONOCYTES # BLD AUTO: 1.25 K/UL — HIGH (ref 0.1–0.6)
MONOCYTES NFR BLD AUTO: 12.6 % — HIGH (ref 1.7–9.3)
NEUTROPHILS # BLD AUTO: 7.22 K/UL — HIGH (ref 1.4–6.5)
NEUTROPHILS NFR BLD AUTO: 73 % — SIGNIFICANT CHANGE UP (ref 42.2–75.2)
NRBC # BLD: 0 /100 WBCS — SIGNIFICANT CHANGE UP (ref 0–0)
PLATELET # BLD AUTO: 453 K/UL — HIGH (ref 130–400)
PMV BLD: 10.4 FL — SIGNIFICANT CHANGE UP (ref 7.4–10.4)
POTASSIUM SERPL-MCNC: 3.7 MMOL/L — SIGNIFICANT CHANGE UP (ref 3.5–5)
POTASSIUM SERPL-SCNC: 3.7 MMOL/L — SIGNIFICANT CHANGE UP (ref 3.5–5)
PROT SERPL-MCNC: 7.1 G/DL — SIGNIFICANT CHANGE UP (ref 6–8)
RBC # BLD: 3.58 M/UL — LOW (ref 4.7–6.1)
RBC # FLD: 18.2 % — HIGH (ref 11.5–14.5)
SODIUM SERPL-SCNC: 140 MMOL/L — SIGNIFICANT CHANGE UP (ref 135–146)
WBC # BLD: 9.9 K/UL — SIGNIFICANT CHANGE UP (ref 4.8–10.8)
WBC # FLD AUTO: 9.9 K/UL — SIGNIFICANT CHANGE UP (ref 4.8–10.8)

## 2024-10-03 PROCEDURE — 99233 SBSQ HOSP IP/OBS HIGH 50: CPT

## 2024-10-03 RX ADMIN — BENZONATATE 100 MILLIGRAM(S): 150 CAPSULE ORAL at 23:06

## 2024-10-03 RX ADMIN — PIPERACILLIN SODIUM AND TAZOBACTAM SODIUM 25 GRAM(S): 12; 1.5 INJECTION, POWDER, LYOPHILIZED, FOR SOLUTION INTRAVENOUS at 00:07

## 2024-10-03 RX ADMIN — Medication 90 MILLIGRAM(S): at 05:42

## 2024-10-03 RX ADMIN — ATORVASTATIN CALCIUM 40 MILLIGRAM(S): 10 TABLET, FILM COATED ORAL at 21:52

## 2024-10-03 RX ADMIN — CHLORHEXIDINE GLUCONATE ORAL RINSE 1 APPLICATION(S): 1.2 SOLUTION DENTAL at 05:44

## 2024-10-03 RX ADMIN — PIPERACILLIN SODIUM AND TAZOBACTAM SODIUM 25 GRAM(S): 12; 1.5 INJECTION, POWDER, LYOPHILIZED, FOR SOLUTION INTRAVENOUS at 21:52

## 2024-10-03 RX ADMIN — CAPECITABINE 2000 MILLIGRAM(S): 150 TABLET, FILM COATED ORAL at 10:06

## 2024-10-03 RX ADMIN — DRONABINOL 5 MILLIGRAM(S): 5 CAPSULE ORAL at 05:42

## 2024-10-03 RX ADMIN — PIPERACILLIN SODIUM AND TAZOBACTAM SODIUM 25 GRAM(S): 12; 1.5 INJECTION, POWDER, LYOPHILIZED, FOR SOLUTION INTRAVENOUS at 13:02

## 2024-10-03 RX ADMIN — PIPERACILLIN SODIUM AND TAZOBACTAM SODIUM 25 GRAM(S): 12; 1.5 INJECTION, POWDER, LYOPHILIZED, FOR SOLUTION INTRAVENOUS at 05:44

## 2024-10-03 RX ADMIN — FUROSEMIDE 40 MILLIGRAM(S): 10 INJECTION INTRAVENOUS at 05:42

## 2024-10-03 NOTE — PROGRESS NOTE ADULT - MUSCULOSKELETAL
no calf tenderness/decreased strength
normal/ROM intact/normal gait/strength 5/5 bilateral upper extremities/strength 5/5 bilateral lower extremities

## 2024-10-03 NOTE — PROGRESS NOTE ADULT - ASSESSMENT
64 year old man with a past medical history of Stage IV colon cancer (follows with Dr. Solo), HTN , DM, presented for fever of 1 day duration    #Fever  zosyn  per ID until paracentesis   follow up ID for further antibiotics     #Colon cancer Stage IV withAdenopathy. Carcinomatosis with    mesenteric nodularity throughout.   on  Capecitabine   paracentesis planned for Friday     #HTN    #DM  POCT Blood Glucose.: 89 mg/dL (03 Oct 2024 08:35)  POCT Blood Glucose.: 113 mg/dL (02 Oct 2024 21:17)  POCT Blood Glucose.: 100 mg/dL (02 Oct 2024 16:45)  POCT Blood Glucose.: 115 mg/dL (02 Oct 2024 11:15)  controlled       #Full code     #DVT prophylaxis: lovenox    PROGRESS NOTE HANDOFF    Pending: zosyn until Friday with paracentesis     Family discussion: patient verbalized understanding and agreeable to plan of care , discussed with daughter via telephone at bedside     Disposition: Home

## 2024-10-03 NOTE — PROGRESS NOTE ADULT - ASSESSMENT
64 year old man with a past medical history of Stage IV colon cancer (follows with Dr. Solo), HTN , DM, presented for fever of 1 day duration    #Fever-no clear source- No neutropenia   #Colon cancer Stage IV  #Patient recently started on  Capecitabine last week  - Patient reported that he developed fever od 38.2 today and was having severe chills all over his body. PE unrevealing. Has a PICC line on the left arm with clean surrounding skin  - Patient started last week on Capecitabine, part of his scheduled CAPOX plus GENET regimen. Follows with Dr. Patel  - Patient had a paracentesis for ascites back in early September that did not grow bacteria.  -EKG: sinus tachycardia  -CXR: No infiltrates   -UA negative    - Lac: 2.3  - s/p Vancomycin and cefepime  - on zosyn  - CT abdomen/pelvis with IV contrast:          -No enhancing abscess         -Extensive metastatic disease within the lungs and liver. Adenopathy. Carcinomatosis with            mesenteric nodularity throughout. Moderate to large amount of ascites.          -Infiltrative soft tissue mass surrounding the cecum. Involvement of the terminal ileum.    Adjacent numerous mesenteric masses/lymph nodes.          -Moderate pleural effusions with compressive atelectasis.           -Prominence of the common femoral veins bilaterally. Question clot. DVT study recommended.  - f/u ID  - f/u duplex  - IR consulted for possible drainage  - f/u heme onc    #HTN  #DM  - Patient on home HCTZ 12.5, Nifedipine 60 mg ER, Januvia, and Pioglitzanoe  - ISS    #Full code   #DVT prophylaxis: lovenox 64 year old man with a past medical history of Stage IV colon cancer (follows with Dr. Solo), HTN , DM, presented for fever of 1 day duration    #Fever-no clear source- No neutropenia   #Colon cancer Stage IV  #Patient recently started on  Capecitabine last week  - Patient reported that he developed fever od 38.2 today and was having severe chills all over his body. PE unrevealing. Has a PICC line on the left arm with clean surrounding skin  - Patient started last week on Capecitabine, part of his scheduled CAPOX plus GENET regimen. Follows with Dr. Patel  - Patient had a paracentesis for ascites back in early September that did not grow bacteria.  -EKG: sinus tachycardia  -CXR: No infiltrates   -UA negative    - Lac: 2.3  - s/p Vancomycin and cefepime  - on zosyn  - CT abdomen/pelvis with IV contrast:          -No enhancing abscess         -Extensive metastatic disease within the lungs and liver. Adenopathy. Carcinomatosis with            mesenteric nodularity throughout. Moderate to large amount of ascites.          -Infiltrative soft tissue mass surrounding the cecum. Involvement of the terminal ileum.    Adjacent numerous mesenteric masses/lymph nodes.          -Moderate pleural effusions with compressive atelectasis.           -Prominence of the common femoral veins bilaterally. Question clot. DVT study recommended.  - f/u ID  - f/u duplex  - IR consulted for possible drainage-> will do para friday 10/4, holding aspirin until then per IR request  - f/u heme onc    #HTN  #DM  - Patient on home HCTZ 12.5, Nifedipine 60 mg ER, Januvia, and Pioglitzanoe  - ISS    #Full code   #DVT prophylaxis: lovenox [Negative] : Allergic/Immunologic [FreeTextEntry8] : see HPI

## 2024-10-03 NOTE — PROGRESS NOTE ADULT - ASSESSMENT
ASSESSMENT  64y Male with a PMH of Stage IV colon cancer, HTN , and DM presented with a one day history of fever at home and chills, patient recently started on capecitabine as part of his chemotherapy regimen of CAPOX and GENET. Patient denies fevers, shortness of breath, chest pain, headache, or malaise while in the ED, Xray shows bilateral pleural effusions, lactate 1.4 from 2.4 and wbc 10.54.    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   Doubt infectious etiology of SIRS but will for now continue with Abx as feves have resolved  Heavy tumor burden with extensive liver mets  resolved SIRS secondary to recent CT and tumor burden  No evidence of cholangitis  RLL with atelectatic changes  9/30 CT with b/l pleural effusions and atelectasis RLL  Clinically stable and has no complaints  WBC 9.9  9/30 CXR left pleural effusion ( reviewed )  9/29 BCX NG  Nares ORSA NG  10/1 UCx NG  RVP NG  COVID 19/ Influenza/ RSV NG.     # Stage IV colon cancer  #HTN  #DM  #Hepatomegaly      RECOMMENDATIONS  -zosyn 3.375g IV q8   -paracentesis scheduled for friday. Continue with Zosyn tlll then and post paracentesis d/c Zosyn with no need for oral ABx on discharge    Please do not hesitate to recall ID if any questions arise either through [employer] or through [employer] teams

## 2024-10-03 NOTE — PROGRESS NOTE ADULT - ASSESSMENT
64 year old man with a past medical history of Stage IV colon cancer (follows with Dr. Solo), HTN , DM, presented for fever of 1 day duration    #Fever-no clear source- No neutropenia   #Colon cancer Stage IV  #Patient recently started on  Capecitabine last week  - Patient reported that he developed fever od 38.2 today and was having severe chills all over his body. PE unrevealing. Has a PICC line on the left arm with clean surrounding skin  - Patient started last week on Capecitabine, part of his scheduled CAPOX plus GENET regimen. Follows with Dr. Patel  - Patient had a paracentesis for ascites back in early September that did not grow bacteria.  -EKG: sinus tachycardia  -CXR: No infiltrates   -UA negative    - s/p Vancomycin and cefepime  -on zosyn-> cont until dc, no abx needed after dc per ID  - CT abdomen/pelvis with IV contrast:          -No enhancing abscess         -Extensive metastatic disease within the lungs and liver. Adenopathy. Carcinomatosis with            mesenteric nodularity throughout. Moderate to large amount of ascites.          -Infiltrative soft tissue mass surrounding the cecum. Involvement of the terminal ileum.    Adjacent numerous mesenteric masses/lymph nodes.          -Moderate pleural effusions with compressive atelectasis.           -Prominence of the common femoral veins bilaterally. Question clot. DVT study recommended.  - f/u duplex-> neg  - IR consulted for possible drainage-> will do para friday 10/4, holding aspirin until then per IR request  - heme onc following ->s/p chemo 10/2    #HTN  #DM  - Patient on home HCTZ 12.5, Nifedipine 60 mg ER, Januvia, and Pioglitzanoe  - ISS

## 2024-10-03 NOTE — PROGRESS NOTE ADULT - NS ATTEST RISK PROBLEM GEN_ALL_CORE FT
I have personally seen and examined this patient. I have reviewed all pertinent clinical information and reviewed all relevant imaging ( and noted the impression from the Radiologist ) and diagnostic studies personally. I counseled the patient about the diagnostic testing and treatment plan. I discussed my recommendations with the primary team.
-Patient has an illness which poses a threat to life or bodily function without treatment.  -I reviewed external records as available  -I recommended ordering relevant tests to diagnose an Infection  -I reviewed the completed testing ( labs, imaging )  -My assessment required an independent historian  -I independently interpreted the most recent imaging ( CXR )  -I discussed my recommendations with the primary team housestaff/Attending  -I assisted in the decision regarding continued need for hospitalization / or deescalation of care as needed.  -Patient is presently on drug therapy that requires intense monitoring or toxicity and adjustment of the Antibiotics based on creatinine clearence

## 2024-10-03 NOTE — PROGRESS NOTE ADULT - GASTROINTESTINAL
nontender/no guarding/no rigidity/distended/bowel sounds hypoactive
soft/nontender/no guarding/no rigidity/distended

## 2024-10-04 ENCOUNTER — TRANSCRIPTION ENCOUNTER (OUTPATIENT)
Age: 64
End: 2024-10-04

## 2024-10-04 ENCOUNTER — RESULT REVIEW (OUTPATIENT)
Age: 64
End: 2024-10-04

## 2024-10-04 VITALS
HEART RATE: 92 BPM | OXYGEN SATURATION: 99 % | DIASTOLIC BLOOD PRESSURE: 74 MMHG | SYSTOLIC BLOOD PRESSURE: 114 MMHG | RESPIRATION RATE: 18 BRPM

## 2024-10-04 LAB
B PERT IGG+IGM PNL SER: ABNORMAL
COLOR FLD: YELLOW — SIGNIFICANT CHANGE UP
FLUID INTAKE SUBSTANCE CLASS: SIGNIFICANT CHANGE UP
GRAM STN FLD: SIGNIFICANT CHANGE UP
LYMPHOCYTES # FLD: 92 — SIGNIFICANT CHANGE UP
MONOS+MACROS # FLD: 5 % — SIGNIFICANT CHANGE UP
NEUTROPHILS-BODY FLUID: 3 % — SIGNIFICANT CHANGE UP
RCV VOL RI: 4000 /UL — HIGH (ref 0–0)
SPECIMEN SOURCE: SIGNIFICANT CHANGE UP
TOTAL NUCLEATED CELL COUNT, BODY FLUID: 367 /UL — SIGNIFICANT CHANGE UP
TUBE TYPE: SIGNIFICANT CHANGE UP

## 2024-10-04 PROCEDURE — 99233 SBSQ HOSP IP/OBS HIGH 50: CPT

## 2024-10-04 PROCEDURE — 88112 CYTOPATH CELL ENHANCE TECH: CPT | Mod: 26

## 2024-10-04 PROCEDURE — 99448 NTRPROF PH1/NTRNET/EHR 21-30: CPT

## 2024-10-04 RX ORDER — BENZONATATE 150 MG/1
1 CAPSULE ORAL
Qty: 90 | Refills: 0
Start: 2024-10-04 | End: 2024-11-02

## 2024-10-04 RX ORDER — FUROSEMIDE 10 MG/ML
1 INJECTION INTRAVENOUS
Qty: 30 | Refills: 0
Start: 2024-10-04 | End: 2024-11-02

## 2024-10-04 RX ORDER — DRONABINOL 5 MG/1
1 CAPSULE ORAL
Qty: 15 | Refills: 0
Start: 2024-10-04 | End: 2024-10-18

## 2024-10-04 RX ORDER — CAPECITABINE 150 MG/1
4 TABLET, FILM COATED ORAL
Qty: 0 | Refills: 0 | DISCHARGE
Start: 2024-10-04

## 2024-10-04 RX ADMIN — FUROSEMIDE 40 MILLIGRAM(S): 10 INJECTION INTRAVENOUS at 05:40

## 2024-10-04 RX ADMIN — Medication 90 MILLIGRAM(S): at 05:40

## 2024-10-04 RX ADMIN — PIPERACILLIN SODIUM AND TAZOBACTAM SODIUM 25 GRAM(S): 12; 1.5 INJECTION, POWDER, LYOPHILIZED, FOR SOLUTION INTRAVENOUS at 05:40

## 2024-10-04 RX ADMIN — DRONABINOL 5 MILLIGRAM(S): 5 CAPSULE ORAL at 05:40

## 2024-10-04 RX ADMIN — CHLORHEXIDINE GLUCONATE ORAL RINSE 1 APPLICATION(S): 1.2 SOLUTION DENTAL at 05:43

## 2024-10-04 NOTE — DISCHARGE NOTE NURSING/CASE MANAGEMENT/SOCIAL WORK - NSDCPEFALRISK_GEN_ALL_CORE
For information on Fall & Injury Prevention, visit: [email or web address] OR  [email or web address] OR  https://www.cdc.gov/steadi/patient.html

## 2024-10-04 NOTE — DISCHARGE NOTE NURSING/CASE MANAGEMENT/SOCIAL WORK - PATIENT PORTAL LINK FT
You can access the FollowMyHealth Patient Portal offered by [facility] by registering at the following website: [email or web address]. By joining [employer]’s FollowMyHealth portal, you will also be able to view your health information using other applications (apps) compatible with our system.

## 2024-10-04 NOTE — PROGRESS NOTE ADULT - SUBJECTIVE AND OBJECTIVE BOX
JADA QUIROZ  64y  [facility]B 017 A      Patient is a 64y old  Male who presents with a chief complaint of febrile on chemo tx (01 Oct 2024 13:23)      My note supersedes the resident's note      INTERVAL HPI/OVERNIGHT EVENTS:  no acute events overnight       REVIEW OF SYSTEMS:  CONSTITUTIONAL: No fever, weight loss, or fatigue  EYES: No eye pain, visual disturbances, or discharge  ENMT:  No difficulty hearing, tinnitus, vertigo; No sinus or throat pain  NECK: No pain or stiffness  BREASTS: No pain, masses, or nipple discharge  RESPIRATORY: No cough, wheezing, chills or hemoptysis; No shortness of breath  CARDIOVASCULAR: No chest pain, palpitations, dizziness, or leg swelling  GASTROINTESTINAL: No abdominal or epigastric pain. No nausea, vomiting, or hematemesis; No diarrhea or constipation. No melena or hematochezia.  GENITOURINARY: No dysuria, frequency, hematuria, or incontinence  NEUROLOGICAL: No headaches, memory loss, loss of strength, numbness, or tremors  SKIN: No itching, burning, rashes, or lesions   LYMPH NODES: No enlarged glands  ENDOCRINE: No heat or cold intolerance; No hair loss  MUSCULOSKELETAL: No joint pain or swelling; No muscle, back, or extremity pain  PSYCHIATRIC: No depression, anxiety, mood swings, or difficulty sleeping  HEME/LYMPH: No easy bruising, or bleeding gums  ALLERY AND IMMUNOLOGIC: No hives or eczema  FAMILY HISTORY:    T(C): 36.8 (10-02-24 @ 08:04), Max: 36.9 (10-01-24 @ 23:32)  HR: 108 (10-02-24 @ 08:04) (62 - 111)  BP: 130/82 (10-02-24 @ 08:04) (105/71 - 137/75)  RR: 19 (10-02-24 @ 08:04) (18 - 20)  SpO2: 97% (10-02-24 @ 08:04) (96% - 98%)  Wt(kg): --Vital Signs Last 24 Hrs  T(C): 36.8 (02 Oct 2024 08:04), Max: 36.9 (01 Oct 2024 23:32)  T(F): 98.3 (02 Oct 2024 08:04), Max: 98.4 (01 Oct 2024 23:32)  HR: 108 (02 Oct 2024 08:04) (62 - 111)  BP: 130/82 (02 Oct 2024 08:04) (105/71 - 137/75)  BP(mean): --  RR: 19 (02 Oct 2024 08:04) (18 - 20)  SpO2: 97% (02 Oct 2024 08:04) (96% - 98%)    Parameters below as of 02 Oct 2024 08:04  Patient On (Oxygen Delivery Method): room air        PHYSICAL EXAM:  GENERAL: NAD,   HEAD:  Atraumatic, Normocephalic  EYES: EOMI, PERRLA, conjunctiva and sclera clear  ENMT: No tonsillar erythema, exudates, or enlargement; Moist mucous membranes, Good dentition, No lesions  NECK: Supple, No JVD, Normal thyroid  NERVOUS SYSTEM:  Alert & Oriented X3, Good concentration; Motor Strength 5/5 B/L upper and lower extremities; DTRs 2+ intact and symmetric  PULM: Clear to auscultation bilaterally  CARDIAC: Regular rate and rhythm; No murmurs, rubs, or gallops  GI: Soft, Nontender, Nondistended; Bowel sounds present  EXTREMITIES:  2+ Peripheral Pulses, No clubbing, cyanosis, or edema  LYMPH: No lymphadenopathy noted  SKIN: No rashes or lesions    Consultant(s) Notes Reviewed:  [x ] YES  [ ] NO  Care Discussed with Consultants/Other Providers [ x] YES  [ ] NO    LABS:                            8.9    8.46  )-----------( 435      ( 02 Oct 2024 07:22 )             26.7   10-02    139  |  98  |  9[L]  ----------------------------<  76  3.4[L]   |  30  |  0.8    Ca    8.8      02 Oct 2024 07:22  Phos  2.9     10-02  Mg     1.9     10-02    TPro  6.7  /  Alb  3.2[L]  /  TBili  0.5  /  DBili  x   /  AST  57[H]  /  ALT  24  /  AlkPhos  340[H]  10-02            Urinalysis with Rflx Culture (collected 30 Sep 2024 00:05)    Culture - Blood (collected 29 Sep 2024 22:50)  Source: .Blood BLOOD  Preliminary Report (02 Oct 2024 09:01):    No growth at 48 Hours    Culture - Blood (collected 29 Sep 2024 22:50)  Source: .Blood BLOOD  Preliminary Report (02 Oct 2024 09:01):    No growth at 48 Hours      acetaminophen     Tablet .. 650 milliGRAM(s) Oral every 6 hours PRN  atorvastatin 40 milliGRAM(s) Oral at bedtime  benzonatate 100 milliGRAM(s) Oral every 8 hours PRN  chlorhexidine 2% Cloths 1 Application(s) Topical <User Schedule>  dextrose 5%. 1000 milliLiter(s) IV Continuous <Continuous>  dextrose 5%. 1000 milliLiter(s) IV Continuous <Continuous>  dextrose 50% Injectable 12.5 Gram(s) IV Push once  dextrose 50% Injectable 25 Gram(s) IV Push once  dextrose 50% Injectable 25 Gram(s) IV Push once  dextrose Oral Gel 15 Gram(s) Oral once PRN  dronabinol 5 milliGRAM(s) Oral daily  enoxaparin Injectable 40 milliGRAM(s) SubCutaneous every 24 hours  furosemide    Tablet 40 milliGRAM(s) Oral daily  glucagon  Injectable 1 milliGRAM(s) IntraMuscular once  hydrochlorothiazide 12.5 milliGRAM(s) Oral daily  insulin lispro (ADMELOG) corrective regimen sliding scale   SubCutaneous three times a day before meals  NIFEdipine XL 90 milliGRAM(s) Oral daily  polyethylene glycol 3350 17 Gram(s) Oral two times a day  potassium chloride   Powder 40 milliEquivalent(s) Oral every 4 hours  senna 2 Tablet(s) Oral at bedtime      HEALTH ISSUES - PROBLEM Dx:          Case Discussed with House Staff     Spectra [phone]  
Interventional Radiology Brief- Operative Note: Paracentesis    Procedure: image guided paracentesis     Pre-Op Diagnosis: colon ca with recurrent ascites     Post-Op Diagnosis: same     Provider: Hamida Coughlin PA-C    Anesthesia (type):  [ ] General Anesthesia  [ ] Sedation  [ ] Spinal Anesthesia  [ x ] Local/Regional    Contrast: none    Estimated Blood Loss: <5mL    Condition:   [ ] Critical  [ ] Serious  [ ] Fair   [ ] Good    Findings/Follow up Plan of Care: 2.8 liters serous fluid removed     Specimens Removed: 120cc sent to lab    Implants: none     Complications: none     Condition/Disposition: d/c to floor     Discharge instructions: resume diet and activity as tolerated, resume medications as needed, keep dressing clean and dry - remove after 2 days, follow up paracentesis as needed    To contact Interventional Radiology with any questions, concerns or issues please utilize the following:  M-F 7am-5pm: [email or web address] on teams,  consult spectra: [phone]  After hours/weekends/holidays: [phone]         
  JADA QUIROZ  64y  [facility] 017 A      Patient is a 64y old  Male who presents with a chief complaint of fever (02 Oct 2024 12:00)      My note supersedes the resident's note      INTERVAL HPI/OVERNIGHT EVENTS:  no acute events overnight       REVIEW OF SYSTEMS:  CONSTITUTIONAL: No fever, weight loss, or fatigue  EYES: No eye pain, visual disturbances, or discharge  ENMT:  No difficulty hearing, tinnitus, vertigo; No sinus or throat pain  NECK: No pain or stiffness  BREASTS: No pain, masses, or nipple discharge  RESPIRATORY: No cough, wheezing, chills or hemoptysis; No shortness of breath  CARDIOVASCULAR: No chest pain, palpitations, dizziness, or leg swelling  GASTROINTESTINAL: No abdominal or epigastric pain. No nausea, vomiting, or hematemesis; No diarrhea or constipation. No melena or hematochezia.  GENITOURINARY: No dysuria, frequency, hematuria, or incontinence  NEUROLOGICAL: No headaches, memory loss, loss of strength, numbness, or tremors  SKIN: No itching, burning, rashes, or lesions   LYMPH NODES: No enlarged glands  ENDOCRINE: No heat or cold intolerance; No hair loss  MUSCULOSKELETAL: No joint pain or swelling; No muscle, back, or extremity pain  PSYCHIATRIC: No depression, anxiety, mood swings, or difficulty sleeping  HEME/LYMPH: No easy bruising, or bleeding gums  ALLERY AND IMMUNOLOGIC: No hives or eczema  FAMILY HISTORY:    T(C): 36.6 (10-03-24 @ 08:13), Max: 37.5 (10-02-24 @ 15:00)  HR: 90 (10-03-24 @ 08:13) (89 - 103)  BP: 115/78 (10-03-24 @ 08:13) (108/71 - 134/72)  RR: 18 (10-03-24 @ 08:13) (18 - 18)  SpO2: 98% (10-03-24 @ 08:13) (96% - 99%)  Wt(kg): --Vital Signs Last 24 Hrs  T(C): 36.6 (03 Oct 2024 08:13), Max: 37.5 (02 Oct 2024 15:00)  T(F): 97.9 (03 Oct 2024 08:13), Max: 99.5 (02 Oct 2024 15:00)  HR: 90 (03 Oct 2024 08:13) (89 - 103)  BP: 115/78 (03 Oct 2024 08:13) (108/71 - 134/72)  BP(mean): --  RR: 18 (03 Oct 2024 08:13) (18 - 18)  SpO2: 98% (03 Oct 2024 08:13) (96% - 99%)    Parameters below as of 03 Oct 2024 08:13  Patient On (Oxygen Delivery Method): room air        PHYSICAL EXAM:  GENERAL: NAD,   HEAD:  Atraumatic, Normocephalic  EYES: EOMI, PERRLA, conjunctiva and sclera clear  ENMT: No tonsillar erythema, exudates, or enlargement; Moist mucous membranes, Good dentition, No lesions  NECK: Supple, No JVD, Normal thyroid  NERVOUS SYSTEM:  Alert & Oriented X3, Good concentration; Motor Strength 5/5 B/L upper and lower extremities; DTRs 2+ intact and symmetric  PULM: Clear to auscultation bilaterally  CARDIAC: Regular rate and rhythm; No murmurs, rubs, or gallops  GI: Soft, Nontender,  + distended; Bowel sounds present  EXTREMITIES: +1 pitting edema   LYMPH: No lymphadenopathy noted  SKIN: No rashes or lesions    Consultant(s) Notes Reviewed:  [x ] YES  [ ] NO  Care Discussed with Consultants/Other Providers [ x] YES  [ ] NO    LABS:                            9.7    9.90  )-----------( 453      ( 03 Oct 2024 06:26 )             30.0   10-03    140  |  98  |  9[L]  ----------------------------<  82  3.7   |  29  |  0.9    Ca    8.8      03 Oct 2024 06:26  Phos  2.9     10-02  Mg     1.9     10-03    TPro  7.1  /  Alb  3.6  /  TBili  0.5  /  DBili  x   /  AST  64[H]  /  ALT  26  /  AlkPhos  361[H]  10-03            Culture - Urine (collected 01 Oct 2024 09:55)  Source: Clean Catch Clean Catch (Midstream)  Final Report (02 Oct 2024 16:17):    No growth      acetaminophen     Tablet .. 650 milliGRAM(s) Oral every 6 hours PRN  atorvastatin 40 milliGRAM(s) Oral at bedtime  benzonatate 100 milliGRAM(s) Oral every 8 hours PRN  capecitabine 2000 milliGRAM(s) Oral two times a day  chlorhexidine 2% Cloths 1 Application(s) Topical <User Schedule>  dextrose 5%. 1000 milliLiter(s) IV Continuous <Continuous>  dextrose 5%. 1000 milliLiter(s) IV Continuous <Continuous>  dextrose 50% Injectable 12.5 Gram(s) IV Push once  dextrose 50% Injectable 25 Gram(s) IV Push once  dextrose 50% Injectable 25 Gram(s) IV Push once  dextrose Oral Gel 15 Gram(s) Oral once PRN  dronabinol 5 milliGRAM(s) Oral daily  enoxaparin Injectable 40 milliGRAM(s) SubCutaneous every 24 hours  furosemide    Tablet 40 milliGRAM(s) Oral daily  glucagon  Injectable 1 milliGRAM(s) IntraMuscular once  hydrochlorothiazide 12.5 milliGRAM(s) Oral daily  insulin lispro (ADMELOG) corrective regimen sliding scale   SubCutaneous three times a day before meals  NIFEdipine XL 90 milliGRAM(s) Oral daily  piperacillin/tazobactam IVPB.. 3.375 Gram(s) IV Intermittent every 8 hours  polyethylene glycol 3350 17 Gram(s) Oral two times a day  senna 2 Tablet(s) Oral at bedtime      HEALTH ISSUES - PROBLEM Dx:          Case Discussed with House Staff    Spectra [phone]  
SUBJECTIVE/OVERNIGHT EVENTS  Today is hospital day 1d. This morning patient was seen and examined at bedside, resting comfortably in bed. No acute or major events overnight.        MEDICATIONS  STANDING MEDICATIONS  aspirin  chewable 81 milliGRAM(s) Oral daily  atorvastatin 40 milliGRAM(s) Oral at bedtime  chlorhexidine 2% Cloths 1 Application(s) Topical <User Schedule>  dextrose 5%. 1000 milliLiter(s) IV Continuous <Continuous>  dextrose 5%. 1000 milliLiter(s) IV Continuous <Continuous>  dextrose 50% Injectable 12.5 Gram(s) IV Push once  dextrose 50% Injectable 25 Gram(s) IV Push once  dextrose 50% Injectable 25 Gram(s) IV Push once  dronabinol 5 milliGRAM(s) Oral daily  enoxaparin Injectable 40 milliGRAM(s) SubCutaneous every 24 hours  furosemide    Tablet 40 milliGRAM(s) Oral daily  glucagon  Injectable 1 milliGRAM(s) IntraMuscular once  hydrochlorothiazide 12.5 milliGRAM(s) Oral daily  insulin lispro (ADMELOG) corrective regimen sliding scale   SubCutaneous three times a day before meals  NIFEdipine XL 90 milliGRAM(s) Oral daily  piperacillin/tazobactam IVPB.- 3.375 Gram(s) IV Intermittent once  piperacillin/tazobactam IVPB.. 3.375 Gram(s) IV Intermittent every 8 hours  polyethylene glycol 3350 17 Gram(s) Oral two times a day  potassium chloride   Powder 40 milliEquivalent(s) Oral every 2 hours  senna 2 Tablet(s) Oral at bedtime    PRN MEDICATIONS  acetaminophen     Tablet .. 650 milliGRAM(s) Oral every 6 hours PRN  benzonatate 100 milliGRAM(s) Oral every 8 hours PRN  dextrose Oral Gel 15 Gram(s) Oral once PRN    VITALS  T(F): 98 (10-01-24 @ 08:16), Max: 98.6 (09-30-24 @ 15:41)  HR: 88 (10-01-24 @ 10:02) (88 - 105)  BP: 110/76 (10-01-24 @ 10:02) (110/76 - 135/81)  RR: 18 (10-01-24 @ 08:16) (16 - 19)  SpO2: 97% (10-01-24 @ 08:16) (96% - 98%)  POCT Blood Glucose.: 104 mg/dL (10-01-24 @ 11:14)  POCT Blood Glucose.: 111 mg/dL (09-30-24 @ 22:05)  POCT Blood Glucose.: 117 mg/dL (09-30-24 @ 16:47)  POCT Blood Glucose.: 120 mg/dL (09-30-24 @ 11:27)    PHYSICAL EXAM  GENERAL  ( x ) NAD, lying in bed comfortably     (  ) obtunded     (  ) lethargic     (  ) somnolent    HEAD  (  ) Atraumatic     (  ) hematoma     (  ) laceration (specify location:       )     NECK  (  ) Supple     (  ) neck stiffness     (  ) nuchal rigidity     (  )  no JVD     (  ) JVD present ( -- cm)    HEART  Rate -->  (  x) normal rate    (  ) bradycardic    (  ) tachycardic  Rhythm -->  (  x) regular    (  ) regularly irregular    (  ) irregularly irregular  Murmurs -->  (  ) normal s1/s2    (  ) systolic murmur    (  ) diastolic murmur    (  ) continuous murmur     (  ) S3 present    (  ) S4 present    LUNGS  (  x)Unlabored respirations     (  ) tachypnea  ( x ) B/L air entry     (  ) decreased breath sounds in:  (location     )    (  ) no adventitious sound     (  ) crackles     (  ) wheezing      (  ) rhonchi      (specify location:       )  (  ) chest wall tenderness (specify location:       )    ABDOMEN  ( x ) Soft     (  ) tense   |   (  ) nondistended     (  ) distended   |   (  ) +BS     (  ) hypoactive bowel sounds     (  ) hyperactive bowel sounds  ( x ) nontender     (  ) RUQ tenderness     (  ) RLQ tenderness     (  ) LLQ tenderness     (  ) epigastric tenderness     (  ) diffuse tenderness  (  ) Splenomegaly      (  ) Hepatomegaly      (  ) Jaundice     (  ) ecchymosis     EXTREMITIES  (  ) Normal     (  ) Rash     (  ) ecchymosis     (  ) varicose veins      (  ) pitting edema     (  ) non-pitting edema   (  ) ulceration     (  ) gangrene:     (location:     )    NERVOUS SYSTEM  (  x) A&Ox3     (  ) confused     (  ) lethargic  CN II-XII:     (  ) Intact     (  ) focal deficits  (Specify:     )   Upper extremities:     (  ) strength X/5     (  ) focal deficit (specify:    )  Lower extremities:     (  ) strength  X/5    (  ) focal deficit (specify:    )    SKIN  (  ) No rashes or lesions     (  ) maculopapular rash     (  ) pustules     (  ) vesicles     (  ) ulcer     (  ) ecchymosis     (specify location:     )    (  ) Indwelling Rabago Catheter   Date insterted:    Reason (  ) Critical illness     (  ) urinary retention    (  ) Accurate Ins/Outs Monitoring     (  ) CMO patient    (  ) Central Line  Date inserted:  Location: (  ) Right IJ   (  ) Left IJ   (  ) Right Fem   (  ) Left Fem    (  ) SPC  (  ) pigtail  (  ) PEG tube  (  ) colostomy  (  ) jejunostomy  (  ) U-Dall    LABS             9.5    8.86  )-----------( 445      ( 10-01-24 @ 07:45 )             28.6     136  |  95  |  9   -------------------------<  106   10-01-24 @ 07:45  3.3  |  29  |  0.7    Ca      8.6     10-01-24 @ 07:45  Mg     1.9     10-01-24 @ 07:45    TPro  6.8  /  Alb  3.4  /  TBili  0.5  /  DBili  x   /  AST  58  /  ALT  25  /  AlkPhos  324  /  GGT  x     10-01-24 @ 07:45    PT/INR - ( 09-29-24 @ 22:50 )   PT: 15.10 sec[H];   INR: 1.32 ratio[H]  PTT - ( 09-29-24 @ 22:50 )  PTT:26.7 sec      Urinalysis Basic - ( 01 Oct 2024 07:45 )    Color: x / Appearance: x / SG: x / pH: x  Gluc: 106 mg/dL / Ketone: x  / Bili: x / Urobili: x   Blood: x / Protein: x / Nitrite: x   Leuk Esterase: x / RBC: x / WBC x   Sq Epi: x / Non Sq Epi: x / Bacteria: x          Urinalysis with Rflx Culture (collected 30 Sep 2024 00:05)    Culture - Blood (collected 29 Sep 2024 22:50)  Source: .Blood BLOOD  Preliminary Report (01 Oct 2024 09:02):    No growth at 24 hours    Culture - Blood (collected 29 Sep 2024 22:50)  Source: .Blood BLOOD  Preliminary Report (01 Oct 2024 09:02):    No growth at 24 hours      IMAGING
SUBJECTIVE/OVERNIGHT EVENTS  Today is hospital day 3d. This morning patient was seen and examined at bedside, resting comfortably in bed. No acute or major events overnight.      MEDICATIONS  STANDING MEDICATIONS  atorvastatin 40 milliGRAM(s) Oral at bedtime  capecitabine 2000 milliGRAM(s) Oral two times a day  chlorhexidine 2% Cloths 1 Application(s) Topical <User Schedule>  dextrose 5%. 1000 milliLiter(s) IV Continuous <Continuous>  dextrose 5%. 1000 milliLiter(s) IV Continuous <Continuous>  dextrose 50% Injectable 12.5 Gram(s) IV Push once  dextrose 50% Injectable 25 Gram(s) IV Push once  dextrose 50% Injectable 25 Gram(s) IV Push once  dronabinol 5 milliGRAM(s) Oral daily  enoxaparin Injectable 40 milliGRAM(s) SubCutaneous every 24 hours  furosemide    Tablet 40 milliGRAM(s) Oral daily  glucagon  Injectable 1 milliGRAM(s) IntraMuscular once  hydrochlorothiazide 12.5 milliGRAM(s) Oral daily  insulin lispro (ADMELOG) corrective regimen sliding scale   SubCutaneous three times a day before meals  NIFEdipine XL 90 milliGRAM(s) Oral daily  piperacillin/tazobactam IVPB.. 3.375 Gram(s) IV Intermittent every 8 hours  polyethylene glycol 3350 17 Gram(s) Oral two times a day  senna 2 Tablet(s) Oral at bedtime    PRN MEDICATIONS  acetaminophen     Tablet .. 650 milliGRAM(s) Oral every 6 hours PRN  benzonatate 100 milliGRAM(s) Oral every 8 hours PRN  dextrose Oral Gel 15 Gram(s) Oral once PRN    VITALS  T(F): 97.9 (10-03-24 @ 08:13), Max: 99.5 (10-02-24 @ 15:00)  HR: 90 (10-03-24 @ 08:13) (89 - 103)  BP: 115/78 (10-03-24 @ 08:13) (108/71 - 134/72)  RR: 18 (10-03-24 @ 08:13) (18 - 18)  SpO2: 98% (10-03-24 @ 08:13) (96% - 99%)  POCT Blood Glucose.: 140 mg/dL (10-03-24 @ 11:12)  POCT Blood Glucose.: 89 mg/dL (10-03-24 @ 08:35)  POCT Blood Glucose.: 97 mg/dL (10-03-24 @ 07:57)  POCT Blood Glucose.: 113 mg/dL (10-02-24 @ 21:17)  POCT Blood Glucose.: 100 mg/dL (10-02-24 @ 16:45)    PHYSICAL EXAM  GENERAL  (x  ) NAD, lying in bed comfortably     (  ) obtunded     (  ) lethargic     (  ) somnolent    HEAD  (  ) Atraumatic     (  ) hematoma     (  ) laceration (specify location:       )     NECK  (  ) Supple     (  ) neck stiffness     (  ) nuchal rigidity     (  )  no JVD     (  ) JVD present ( -- cm)    HEART  Rate -->  (x  ) normal rate    (  ) bradycardic    (  ) tachycardic  Rhythm -->  (x  ) regular    (  ) regularly irregular    (  ) irregularly irregular  Murmurs -->  (  ) normal s1/s2    (  ) systolic murmur    (  ) diastolic murmur    (  ) continuous murmur     (  ) S3 present    (  ) S4 present    LUNGS  (x  )Unlabored respirations     (  ) tachypnea  ( x ) B/L air entry     (  ) decreased breath sounds in:  (location     )    (  ) no adventitious sound     (  ) crackles     (  ) wheezing      (  ) rhonchi      (specify location:       )  (  ) chest wall tenderness (specify location:       )    ABDOMEN  ( x ) Soft     (  ) tense   |   (  ) nondistended     (  ) distended   |   (  ) +BS     (  ) hypoactive bowel sounds     (  ) hyperactive bowel sounds  (x  ) nontender     (  ) RUQ tenderness     (  ) RLQ tenderness     (  ) LLQ tenderness     (  ) epigastric tenderness     (  ) diffuse tenderness  (  ) Splenomegaly      (  ) Hepatomegaly      (  ) Jaundice     (  ) ecchymosis   - ab moderately distended    EXTREMITIES  () Normal     (  ) Rash     (  ) ecchymosis     (  ) varicose veins      (  ) pitting edema     (  ) non-pitting edema   (  ) ulceration     (  ) gangrene:     (location:     )  - pitting edema bilate    NERVOUS SYSTEM  ( x ) A&Ox3     (  ) confused     (  ) lethargic  CN II-XII:     (  ) Intact     (  ) focal deficits  (Specify:     )   Upper extremities:     (  ) strength X/5     (  ) focal deficit (specify:    )  Lower extremities:     (  ) strength  X/5    (  ) focal deficit (specify:    )    SKIN  (  ) No rashes or lesions     (  ) maculopapular rash     (  ) pustules     (  ) vesicles     (  ) ulcer     (  ) ecchymosis     (specify location:     )    (  ) Indwelling Rabago Catheter   Date insterted:    Reason (  ) Critical illness     (  ) urinary retention    (  ) Accurate Ins/Outs Monitoring     (  ) CMO patient    (  ) Central Line  Date inserted:  Location: (  ) Right IJ   (  ) Left IJ   (  ) Right Fem   (  ) Left Fem    (  ) SPC  (  ) pigtail  (  ) PEG tube  (  ) colostomy  (  ) jejunostomy  (  ) U-Dall    LABS             9.7    9.90  )-----------( 453      ( 10-03-24 @ 06:26 )             30.0     140  |  98  |  9   -------------------------<  82   10-03-24 @ 06:26  3.7  |  29  |  0.9    Ca      8.8     10-03-24 @ 06:26  Phos   2.9     10-02-24 @ 07:22  Mg     1.9     10-03-24 @ 06:26    TPro  7.1  /  Alb  3.6  /  TBili  0.5  /  DBili  x   /  AST  64  /  ALT  26  /  AlkPhos  361  /  GGT  x     10-03-24 @ 06:26    PT/INR - ( 10-02-24 @ 07:22 )   PT: 14.90 sec[H];   INR: 1.30 ratio  PTT - ( 10-02-24 @ 07:22 )  PTT:29.8 sec      Urinalysis Basic - ( 03 Oct 2024 06:26 )    Color: x / Appearance: x / SG: x / pH: x  Gluc: 82 mg/dL / Ketone: x  / Bili: x / Urobili: x   Blood: x / Protein: x / Nitrite: x   Leuk Esterase: x / RBC: x / WBC x   Sq Epi: x / Non Sq Epi: x / Bacteria: x          Culture - Urine (collected 01 Oct 2024 09:55)  Source: Clean Catch Clean Catch (Midstream)  Final Report (02 Oct 2024 16:17):    No growth      IMAGING
SUBJECTIVE/OVERNIGHT EVENTS  Today is hospital day 4d. This morning patient was seen and examined at bedside, resting comfortably in bed. No acute or major events overnight.      MEDICATIONS  STANDING MEDICATIONS  albumin human 25% IVPB 50 milliLiter(s) IV Intermittent every 30 minutes  atorvastatin 40 milliGRAM(s) Oral at bedtime  capecitabine 2000 milliGRAM(s) Oral two times a day  chlorhexidine 2% Cloths 1 Application(s) Topical <User Schedule>  dextrose 5%. 1000 milliLiter(s) IV Continuous <Continuous>  dextrose 5%. 1000 milliLiter(s) IV Continuous <Continuous>  dextrose 50% Injectable 25 Gram(s) IV Push once  dextrose 50% Injectable 12.5 Gram(s) IV Push once  dextrose 50% Injectable 25 Gram(s) IV Push once  dronabinol 5 milliGRAM(s) Oral daily  enoxaparin Injectable 40 milliGRAM(s) SubCutaneous every 24 hours  furosemide    Tablet 40 milliGRAM(s) Oral daily  glucagon  Injectable 1 milliGRAM(s) IntraMuscular once  hydrochlorothiazide 12.5 milliGRAM(s) Oral daily  insulin lispro (ADMELOG) corrective regimen sliding scale   SubCutaneous three times a day before meals  NIFEdipine XL 90 milliGRAM(s) Oral daily  piperacillin/tazobactam IVPB.. 3.375 Gram(s) IV Intermittent every 8 hours  polyethylene glycol 3350 17 Gram(s) Oral two times a day  senna 2 Tablet(s) Oral at bedtime    PRN MEDICATIONS  acetaminophen     Tablet .. 650 milliGRAM(s) Oral every 6 hours PRN  benzonatate 100 milliGRAM(s) Oral every 8 hours PRN  dextrose Oral Gel 15 Gram(s) Oral once PRN    VITALS  T(F): 98.4 (10-04-24 @ 07:00), Max: 98.4 (10-04-24 @ 07:00)  HR: 82 (10-04-24 @ 07:00) (82 - 98)  BP: 122/77 (10-04-24 @ 07:00) (109/72 - 143/75)  RR: 18 (10-04-24 @ 07:00) (18 - 19)  SpO2: 96% (10-04-24 @ 07:00) (96% - 97%)    PHYSICAL EXAM  GENERAL  (  x) NAD, lying in bed comfortably     (  ) obtunded     (  ) lethargic     (  ) somnolent    HEAD  (  ) Atraumatic     (  ) hematoma     (  ) laceration (specify location:       )     NECK  (  ) Supple     (  ) neck stiffness     (  ) nuchal rigidity     (  )  no JVD     (  ) JVD present ( -- cm)    HEART  Rate -->  ( x ) normal rate    (  ) bradycardic    (  ) tachycardic  Rhythm -->  ( x ) regular    (  ) regularly irregular    (  ) irregularly irregular  Murmurs -->  (  ) normal s1/s2    (  ) systolic murmur    (  ) diastolic murmur    (  ) continuous murmur     (  ) S3 present    (  ) S4 present    LUNGS  ( x )Unlabored respirations     (  ) tachypnea  ( x ) B/L air entry     (  ) decreased breath sounds in:  (location     )    (  ) no adventitious sound     (  ) crackles     (  ) wheezing      (  ) rhonchi      (specify location:       )  (  ) chest wall tenderness (specify location:       )    ABDOMEN  (  ) Soft     (x  ) tense   |   (  ) nondistended     (  ) distended   |   (  ) +BS     (  ) hypoactive bowel sounds     (  ) hyperactive bowel sounds  (  ) nontender     (  ) RUQ tenderness     (  ) RLQ tenderness     (  ) LLQ tenderness     (  ) epigastric tenderness     (  ) diffuse tenderness  (  ) Splenomegaly      (  ) Hepatomegaly      (  ) Jaundice     (  ) ecchymosis   - distended    EXTREMITIES  (  ) Normal     (  ) Rash     (  ) ecchymosis     (  ) varicose veins      (  ) pitting edema     (  ) non-pitting edema   (  ) ulceration     (  ) gangrene:     (location:     )  - edema better than yday    NERVOUS SYSTEM  (  ) A&Ox3     (  ) confused     (  ) lethargic  CN II-XII:     (  ) Intact     (  ) focal deficits  (Specify:     )   Upper extremities:     (  ) strength X/5     (  ) focal deficit (specify:    )  Lower extremities:     (  ) strength  X/5    (  ) focal deficit (specify:    )    SKIN  (  ) No rashes or lesions     (  ) maculopapular rash     (  ) pustules     (  ) vesicles     (  ) ulcer     (  ) ecchymosis     (specify location:     )    (  ) Indwelling Rabago Catheter   Date insterted:    Reason (  ) Critical illness     (  ) urinary retention    (  ) Accurate Ins/Outs Monitoring     (  ) CMO patient    (  ) Central Line  Date inserted:  Location: (  ) Right IJ   (  ) Left IJ   (  ) Right Fem   (  ) Left Fem    (  ) SPC  (  ) pigtail  (  ) PEG tube  (  ) colostomy  (  ) jejunostomy  (  ) U-Dall    LABS             9.7    9.90  )-----------( 453      ( 10-03-24 @ 06:26 )             30.0     140  |  98  |  9   -------------------------<  82   10-03-24 @ 06:26  3.7  |  29  |  0.9    Ca      8.8     10-03-24 @ 06:26  Mg     1.9     10-03-24 @ 06:26    TPro  7.1  /  Alb  3.6  /  TBili  0.5  /  DBili  x   /  AST  64  /  ALT  26  /  AlkPhos  361  /  GGT  x     10-03-24 @ 06:26        Urinalysis Basic - ( 03 Oct 2024 06:26 )    Color: x / Appearance: x / SG: x / pH: x  Gluc: 82 mg/dL / Ketone: x  / Bili: x / Urobili: x   Blood: x / Protein: x / Nitrite: x   Leuk Esterase: x / RBC: x / WBC x   Sq Epi: x / Non Sq Epi: x / Bacteria: x          IMAGING
  JADA QUIROZ  64y  [facility] [id number] A      Patient is a 64y old  Male who presents with a chief complaint of fever (03 Oct 2024 11:18)      My note supersedes the resident's note      INTERVAL HPI/OVERNIGHT EVENTS:  no acute events overnight       REVIEW OF SYSTEMS:  CONSTITUTIONAL: No fever, weight loss, or fatigue  EYES: No eye pain, visual disturbances, or discharge  ENMT:  No difficulty hearing, tinnitus, vertigo; No sinus or throat pain  NECK: No pain or stiffness  BREASTS: No pain, masses, or nipple discharge  RESPIRATORY: No cough, wheezing, chills or hemoptysis; No shortness of breath  CARDIOVASCULAR: No chest pain, palpitations, dizziness, or leg swelling  GASTROINTESTINAL: No abdominal or epigastric pain. No nausea, vomiting, or hematemesis; No diarrhea or constipation. No melena or hematochezia.  GENITOURINARY: No dysuria, frequency, hematuria, or incontinence  NEUROLOGICAL: No headaches, memory loss, loss of strength, numbness, or tremors  SKIN: No itching, burning, rashes, or lesions   LYMPH NODES: No enlarged glands  ENDOCRINE: No heat or cold intolerance; No hair loss  MUSCULOSKELETAL: No joint pain or swelling; No muscle, back, or extremity pain  PSYCHIATRIC: No depression, anxiety, mood swings, or difficulty sleeping  HEME/LYMPH: No easy bruising, or bleeding gums  ALLERY AND IMMUNOLOGIC: No hives or eczema  FAMILY HISTORY:    T(C): 36.7 (10-03-24 @ 23:48), Max: 36.7 (10-03-24 @ 15:00)  HR: 85 (10-04-24 @ 06:00) (85 - 98)  BP: 118/70 (10-04-24 @ 06:00) (109/72 - 143/75)  RR: 19 (10-03-24 @ 23:48) (18 - 19)  SpO2: 96% (10-03-24 @ 23:48) (96% - 98%)  Wt(kg): --Vital Signs Last 24 Hrs  T(C): 36.7 (03 Oct 2024 23:48), Max: 36.7 (03 Oct 2024 15:00)  T(F): 98.1 (03 Oct 2024 23:48), Max: 98.1 (03 Oct 2024 15:00)  HR: 85 (04 Oct 2024 06:00) (85 - 98)  BP: 118/70 (04 Oct 2024 06:00) (109/72 - 143/75)  BP(mean): --  RR: 19 (03 Oct 2024 23:48) (18 - 19)  SpO2: 96% (03 Oct 2024 23:48) (96% - 98%)    Parameters below as of 03 Oct 2024 23:48  Patient On (Oxygen Delivery Method): room air        PHYSICAL EXAM:  GENERAL: NAD,   HEAD:  Atraumatic, Normocephalic  EYES: EOMI, PERRLA, conjunctiva and sclera clear  ENMT: No tonsillar erythema, exudates, or enlargement; Moist mucous membranes, Good dentition, No lesions  NECK: Supple, No JVD, Normal thyroid  NERVOUS SYSTEM:  Alert & Oriented X3, Good concentration; Motor Strength 5/5 B/L upper and lower extremities; DTRs 2+ intact and symmetric  PULM: Clear to auscultation bilaterally  CARDIAC: Regular rate and rhythm; No murmurs, rubs, or gallops  GI: Soft , distended   EXTREMITIES+ 1 edema   LYMPH: No lymphadenopathy noted  SKIN: No rashes or lesions    Consultant(s) Notes Reviewed:  [x ] YES  [ ] NO  Care Discussed with Consultants/Other Providers [ x] YES  [ ] NO    LABS:                            9.7    9.90  )-----------( 453      ( 03 Oct 2024 06:26 )             30.0   10-03    140  |  98  |  9[L]  ----------------------------<  82  3.7   |  29  |  0.9    Ca    8.8      03 Oct 2024 06:26  Mg     1.9     10-03    TPro  7.1  /  Alb  3.6  /  TBili  0.5  /  DBili  x   /  AST  64[H]  /  ALT  26  /  AlkPhos  361[H]  10-03            Culture - Urine (collected 01 Oct 2024 09:55)  Source: Clean Catch Clean Catch (Midstream)  Final Report (02 Oct 2024 16:17):    No growth      acetaminophen     Tablet .. 650 milliGRAM(s) Oral every 6 hours PRN  atorvastatin 40 milliGRAM(s) Oral at bedtime  benzonatate 100 milliGRAM(s) Oral every 8 hours PRN  capecitabine 2000 milliGRAM(s) Oral two times a day  chlorhexidine 2% Cloths 1 Application(s) Topical <User Schedule>  dextrose 5%. 1000 milliLiter(s) IV Continuous <Continuous>  dextrose 5%. 1000 milliLiter(s) IV Continuous <Continuous>  dextrose 50% Injectable 12.5 Gram(s) IV Push once  dextrose 50% Injectable 25 Gram(s) IV Push once  dextrose 50% Injectable 25 Gram(s) IV Push once  dextrose Oral Gel 15 Gram(s) Oral once PRN  dronabinol 5 milliGRAM(s) Oral daily  enoxaparin Injectable 40 milliGRAM(s) SubCutaneous every 24 hours  furosemide    Tablet 40 milliGRAM(s) Oral daily  glucagon  Injectable 1 milliGRAM(s) IntraMuscular once  hydrochlorothiazide 12.5 milliGRAM(s) Oral daily  insulin lispro (ADMELOG) corrective regimen sliding scale   SubCutaneous three times a day before meals  NIFEdipine XL 90 milliGRAM(s) Oral daily  piperacillin/tazobactam IVPB.. 3.375 Gram(s) IV Intermittent every 8 hours  polyethylene glycol 3350 17 Gram(s) Oral two times a day  senna 2 Tablet(s) Oral at bedtime      HEALTH ISSUES - PROBLEM Dx:          Case Discussed with House Staff   Spectra [phone]  
  JADA QUIROZ  64y, Male    All available historical data reviewed    OVERNIGHT EVENTS:  feels well and has no new complaints  No fevers  no diarrhea  no  issues    ROS:  General: Denies rigors, nightsweats  HEENT: Denies headache, rhinorrhea, sore throat, eye pain  CV: Denies CP, palpitations  PULM: Denies wheezing, hemoptysis  GI: Denies hematemesis, hematochezia, melena  : Denies discharge, hematuria  MSK: Denies arthralgias, myalgias  SKIN: Denies rash, lesions  NEURO:  weakness  PSYCH: Denies depression, anxiety    VITALS:  T(F): 97.9, Max: 99.5 (10-02-24 @ 15:00)  HR: 90  BP: 115/78  RR: 18Vital Signs Last 24 Hrs  T(C): 36.6 (03 Oct 2024 08:13), Max: 37.5 (02 Oct 2024 15:00)  T(F): 97.9 (03 Oct 2024 08:13), Max: 99.5 (02 Oct 2024 15:00)  HR: 90 (03 Oct 2024 08:13) (89 - 103)  BP: 115/78 (03 Oct 2024 08:13) (108/71 - 134/72)  BP(mean): --  RR: 18 (03 Oct 2024 08:13) (18 - 18)  SpO2: 98% (03 Oct 2024 08:13) (96% - 99%)    Parameters below as of 03 Oct 2024 08:13  Patient On (Oxygen Delivery Method): room air        TESTS & MEASUREMENTS:                        9.7    9.90  )-----------( 453      ( 03 Oct 2024 06:26 )             30.0     10-03    140  |  98  |  9[L]  ----------------------------<  82  3.7   |  29  |  0.9    Ca    8.8      03 Oct 2024 06:26  Phos  2.9     10-02  Mg     1.9     10-03    TPro  7.1  /  Alb  3.6  /  TBili  0.5  /  DBili  x   /  AST  64[H]  /  ALT  26  /  AlkPhos  361[H]  10-03    LIVER FUNCTIONS - ( 03 Oct 2024 06:26 )  Alb: 3.6 g/dL / Pro: 7.1 g/dL / ALK PHOS: 361 U/L / ALT: 26 U/L / AST: 64 U/L / GGT: x             Culture - Urine (collected 10-01-24 @ 09:55)  Source: Clean Catch Clean Catch (Midstream)  Final Report (10-02-24 @ 16:17):    No growth    Urinalysis with Rflx Culture (collected 09-30-24 @ 00:05)    Culture - Blood (collected 09-29-24 @ 22:50)  Source: .Blood BLOOD  Preliminary Report (10-03-24 @ 09:01):    No growth at 72 Hours    Culture - Blood (collected 09-29-24 @ 22:50)  Source: .Blood BLOOD  Preliminary Report (10-03-24 @ 09:01):    No growth at 72 Hours      Urinalysis Basic - ( 03 Oct 2024 06:26 )    Color: x / Appearance: x / SG: x / pH: x  Gluc: 82 mg/dL / Ketone: x  / Bili: x / Urobili: x   Blood: x / Protein: x / Nitrite: x   Leuk Esterase: x / RBC: x / WBC x   Sq Epi: x / Non Sq Epi: x / Bacteria: x          Social History:  Tobacco Use: No  Alcohol Use: No  Drug Use: No    RADIOLOGY & ADDITIONAL TESTS:  Personal review of radiological diagnostics performed  Echo and EKG results noted when applicable.     MEDICATIONS:  acetaminophen     Tablet .. 650 milliGRAM(s) Oral every 6 hours PRN  atorvastatin 40 milliGRAM(s) Oral at bedtime  benzonatate 100 milliGRAM(s) Oral every 8 hours PRN  capecitabine 2000 milliGRAM(s) Oral two times a day  chlorhexidine 2% Cloths 1 Application(s) Topical <User Schedule>  dextrose 5%. 1000 milliLiter(s) IV Continuous <Continuous>  dextrose 5%. 1000 milliLiter(s) IV Continuous <Continuous>  dextrose 50% Injectable 25 Gram(s) IV Push once  dextrose 50% Injectable 25 Gram(s) IV Push once  dextrose 50% Injectable 12.5 Gram(s) IV Push once  dextrose Oral Gel 15 Gram(s) Oral once PRN  dronabinol 5 milliGRAM(s) Oral daily  enoxaparin Injectable 40 milliGRAM(s) SubCutaneous every 24 hours  furosemide    Tablet 40 milliGRAM(s) Oral daily  glucagon  Injectable 1 milliGRAM(s) IntraMuscular once  hydrochlorothiazide 12.5 milliGRAM(s) Oral daily  insulin lispro (ADMELOG) corrective regimen sliding scale   SubCutaneous three times a day before meals  NIFEdipine XL 90 milliGRAM(s) Oral daily  piperacillin/tazobactam IVPB.. 3.375 Gram(s) IV Intermittent every 8 hours  polyethylene glycol 3350 17 Gram(s) Oral two times a day  senna 2 Tablet(s) Oral at bedtime      ANTIBIOTICS:  piperacillin/tazobactam IVPB.. 3.375 Gram(s) IV Intermittent every 8 hours    
  JADA QUIROZ  64y, Male    All available historical data reviewed    OVERNIGHT EVENTS:  feels well and has no new complaints  No fevers   no cough  no abd pain    ROS:  General: Denies rigors, nightsweats  HEENT: Denies headache, rhinorrhea, sore throat, eye pain  CV: Denies CP, palpitations  PULM: Denies wheezing, hemoptysis  GI: Denies hematemesis, hematochezia, melena  : Denies discharge, hematuria  MSK: Denies arthralgias, myalgias  SKIN: Denies rash, lesions  NEURO: weakness  PSYCH: Denies depression, anxiety    VITALS:  T(F): 98.3, Max: 98.4 (10-01-24 @ 23:32)  HR: 108  BP: 130/82  RR: 19Vital Signs Last 24 Hrs  T(C): 36.8 (02 Oct 2024 08:04), Max: 36.9 (01 Oct 2024 23:32)  T(F): 98.3 (02 Oct 2024 08:04), Max: 98.4 (01 Oct 2024 23:32)  HR: 108 (02 Oct 2024 08:04) (62 - 111)  BP: 130/82 (02 Oct 2024 08:04) (105/71 - 137/75)  BP(mean): --  RR: 19 (02 Oct 2024 08:04) (18 - 20)  SpO2: 97% (02 Oct 2024 08:04) (96% - 98%)    Parameters below as of 02 Oct 2024 08:04  Patient On (Oxygen Delivery Method): room air        TESTS & MEASUREMENTS:                        9.5    8.86  )-----------( 445      ( 01 Oct 2024 07:45 )             28.6     10-01    136  |  95[L]  |  9[L]  ----------------------------<  106[H]  3.3[L]   |  29  |  0.7    Ca    8.6      01 Oct 2024 07:45  Mg     1.9     10-01    TPro  6.8  /  Alb  3.4[L]  /  TBili  0.5  /  DBili  x   /  AST  58[H]  /  ALT  25  /  AlkPhos  324[H]  10-01    LIVER FUNCTIONS - ( 01 Oct 2024 07:45 )  Alb: 3.4 g/dL / Pro: 6.8 g/dL / ALK PHOS: 324 U/L / ALT: 25 U/L / AST: 58 U/L / GGT: x             Urinalysis with Rflx Culture (collected 09-30-24 @ 00:05)    Culture - Blood (collected 09-29-24 @ 22:50)  Source: .Blood BLOOD  Preliminary Report (10-01-24 @ 09:02):    No growth at 24 hours    Culture - Blood (collected 09-29-24 @ 22:50)  Source: .Blood BLOOD  Preliminary Report (10-01-24 @ 09:02):    No growth at 24 hours      Urinalysis Basic - ( 01 Oct 2024 07:45 )    Color: x / Appearance: x / SG: x / pH: x  Gluc: 106 mg/dL / Ketone: x  / Bili: x / Urobili: x   Blood: x / Protein: x / Nitrite: x   Leuk Esterase: x / RBC: x / WBC x   Sq Epi: x / Non Sq Epi: x / Bacteria: x          Social History:  Tobacco Use: No  Alcohol Use: No  Drug Use: No    RADIOLOGY & ADDITIONAL TESTS:  Personal review of radiological diagnostics performed  Echo and EKG results noted when applicable.     MEDICATIONS:  acetaminophen     Tablet .. 650 milliGRAM(s) Oral every 6 hours PRN  atorvastatin 40 milliGRAM(s) Oral at bedtime  benzonatate 100 milliGRAM(s) Oral every 8 hours PRN  chlorhexidine 2% Cloths 1 Application(s) Topical <User Schedule>  dextrose 5%. 1000 milliLiter(s) IV Continuous <Continuous>  dextrose 5%. 1000 milliLiter(s) IV Continuous <Continuous>  dextrose 50% Injectable 25 Gram(s) IV Push once  dextrose 50% Injectable 25 Gram(s) IV Push once  dextrose 50% Injectable 12.5 Gram(s) IV Push once  dextrose Oral Gel 15 Gram(s) Oral once PRN  dronabinol 5 milliGRAM(s) Oral daily  enoxaparin Injectable 40 milliGRAM(s) SubCutaneous every 24 hours  furosemide    Tablet 40 milliGRAM(s) Oral daily  glucagon  Injectable 1 milliGRAM(s) IntraMuscular once  hydrochlorothiazide 12.5 milliGRAM(s) Oral daily  insulin lispro (ADMELOG) corrective regimen sliding scale   SubCutaneous three times a day before meals  NIFEdipine XL 90 milliGRAM(s) Oral daily  polyethylene glycol 3350 17 Gram(s) Oral two times a day  senna 2 Tablet(s) Oral at bedtime      ANTIBIOTICS:

## 2024-10-04 NOTE — PROGRESS NOTE ADULT - ASSESSMENT
64 year old man with a past medical history of Stage IV colon cancer (follows with Dr. Solo), HTN , DM, presented for fever of 1 day duration    #Fever  zosyn  per ID until paracentesis   need to continue post paracentesis ? will ask ID     #Colon cancer Stage IV withAdenopathy. Carcinomatosis with    mesenteric nodularity throughout.   on  Capecitabine   paracentesis planned for today     #HTN  BP: 118/70 (04 Oct 2024 06:00) (109/72 - 143/75)  controlled     #DM  POCT Blood Glucose.: 140 mg/dL (03 Oct 2024 11:12)  POCT Blood Glucose.: 89 mg/dL (03 Oct 2024 08:35)  controlled       #Full code     #DVT prophylaxis: lovenox    PROGRESS NOTE HANDOFF    Pending: zosyn until Friday with paracentesis , then dc planning     Family discussion: patient verbalized understanding and agreeable to plan of care , discussed with daughter via telephone at bedside     Disposition: Home

## 2024-10-04 NOTE — PROGRESS NOTE ADULT - ASSESSMENT
64 year old man with a past medical history of Stage IV colon cancer (follows with Dr. Solo), HTN , DM, presented for fever of 1 day duration    #Fever-no clear source- No neutropenia   #Colon cancer Stage IV  #Patient recently started on  Capecitabine last week  - Patient reported that he developed fever od 38.2 today and was having severe chills all over his body. PE unrevealing. Has a PICC line on the left arm with clean surrounding skin  - Patient started last week on Capecitabine, part of his scheduled CAPOX plus GENET regimen. Follows with Dr. Patel  - Patient had a paracentesis for ascites back in early September that did not grow bacteria.  -EKG: sinus tachycardia  -CXR: No infiltrates   -UA negative    - s/p Vancomycin and cefepime  -on zosyn-> cont until dc, no abx needed after dc per ID  - CT abdomen/pelvis with IV contrast:          -No enhancing abscess         -Extensive metastatic disease within the lungs and liver. Adenopathy. Carcinomatosis with            mesenteric nodularity throughout. Moderate to large amount of ascites.          -Infiltrative soft tissue mass surrounding the cecum. Involvement of the terminal ileum.    Adjacent numerous mesenteric masses/lymph nodes.          -Moderate pleural effusions with compressive atelectasis.           -Prominence of the common femoral veins bilaterally. Question clot. DVT study recommended.  - f/u duplex-> neg  - IR consulted for possible drainage-> will do para today 10/4, holding aspirin until then per IR request  - heme onc following ->s/p chemo 10/2    #HTN  #DM  - Patient on home HCTZ 12.5, Nifedipine 60 mg ER, Januvia, and Pioglitzanoe  - ISS

## 2024-10-04 NOTE — PROGRESS NOTE ADULT - PROVIDER SPECIALTY LIST ADULT
Hospitalist
Internal Medicine
Internal Medicine
Intervent Radiology
Hospitalist
Hospitalist
Infectious Disease
Infectious Disease
Internal Medicine

## 2024-10-05 LAB
ALBUMIN FLD-MCNC: 2.5 G/DL — SIGNIFICANT CHANGE UP
CULTURE RESULTS: SIGNIFICANT CHANGE UP
CULTURE RESULTS: SIGNIFICANT CHANGE UP
GLUCOSE FLD-MCNC: 75 MG/DL — SIGNIFICANT CHANGE UP
LDH SERPL L TO P-CCNC: 456 U/L — SIGNIFICANT CHANGE UP
PROT FLD-MCNC: 5 G/DL — SIGNIFICANT CHANGE UP
SPECIMEN SOURCE: SIGNIFICANT CHANGE UP
SPECIMEN SOURCE: SIGNIFICANT CHANGE UP

## 2024-10-07 LAB — NON-GYNECOLOGICAL CYTOLOGY STUDY: SIGNIFICANT CHANGE UP

## 2024-10-09 ENCOUNTER — INPATIENT (INPATIENT)
Facility: HOSPITAL | Age: 64
LOS: 1 days | Discharge: HOME | DRG: 638 | End: 2024-10-11
Attending: INTERNAL MEDICINE | Admitting: STUDENT IN AN ORGANIZED HEALTH CARE EDUCATION/TRAINING PROGRAM
Payer: MEDICARE

## 2024-10-09 VITALS
OXYGEN SATURATION: 99 % | HEART RATE: 97 BPM | HEIGHT: 72 IN | SYSTOLIC BLOOD PRESSURE: 131 MMHG | DIASTOLIC BLOOD PRESSURE: 78 MMHG | TEMPERATURE: 98 F | RESPIRATION RATE: 18 BRPM

## 2024-10-09 DIAGNOSIS — E16.2 HYPOGLYCEMIA, UNSPECIFIED: ICD-10-CM

## 2024-10-09 LAB
ALBUMIN SERPL ELPH-MCNC: 4 G/DL — SIGNIFICANT CHANGE UP (ref 3.5–5.2)
ALP SERPL-CCNC: 292 U/L — HIGH (ref 30–115)
ALT FLD-CCNC: 23 U/L — SIGNIFICANT CHANGE UP (ref 0–41)
ANION GAP SERPL CALC-SCNC: 16 MMOL/L — HIGH (ref 7–14)
AST SERPL-CCNC: 64 U/L — HIGH (ref 0–41)
BASOPHILS # BLD AUTO: 0.03 K/UL — SIGNIFICANT CHANGE UP (ref 0–0.2)
BASOPHILS NFR BLD AUTO: 0.4 % — SIGNIFICANT CHANGE UP (ref 0–1)
BILIRUB SERPL-MCNC: 0.6 MG/DL — SIGNIFICANT CHANGE UP (ref 0.2–1.2)
BUN SERPL-MCNC: 11 MG/DL — SIGNIFICANT CHANGE UP (ref 10–20)
CALCIUM SERPL-MCNC: 9.5 MG/DL — SIGNIFICANT CHANGE UP (ref 8.4–10.5)
CHLORIDE SERPL-SCNC: 94 MMOL/L — LOW (ref 98–110)
CO2 SERPL-SCNC: 29 MMOL/L — SIGNIFICANT CHANGE UP (ref 17–32)
CREAT SERPL-MCNC: 0.7 MG/DL — SIGNIFICANT CHANGE UP (ref 0.7–1.5)
CULTURE RESULTS: SIGNIFICANT CHANGE UP
EGFR: 102 ML/MIN/1.73M2 — SIGNIFICANT CHANGE UP
EOSINOPHIL # BLD AUTO: 0.02 K/UL — SIGNIFICANT CHANGE UP (ref 0–0.7)
EOSINOPHIL NFR BLD AUTO: 0.3 % — SIGNIFICANT CHANGE UP (ref 0–8)
GLUCOSE BLDC GLUCOMTR-MCNC: 72 MG/DL — SIGNIFICANT CHANGE UP (ref 70–99)
GLUCOSE SERPL-MCNC: 61 MG/DL — LOW (ref 70–99)
HCT VFR BLD CALC: 30.7 % — LOW (ref 42–52)
HGB BLD-MCNC: 10.2 G/DL — LOW (ref 14–18)
IMM GRANULOCYTES NFR BLD AUTO: 0.3 % — SIGNIFICANT CHANGE UP (ref 0.1–0.3)
LYMPHOCYTES # BLD AUTO: 0.98 K/UL — LOW (ref 1.2–3.4)
LYMPHOCYTES # BLD AUTO: 12.4 % — LOW (ref 20.5–51.1)
MAGNESIUM SERPL-MCNC: 1.8 MG/DL — SIGNIFICANT CHANGE UP (ref 1.8–2.4)
MCHC RBC-ENTMCNC: 27.3 PG — SIGNIFICANT CHANGE UP (ref 27–31)
MCHC RBC-ENTMCNC: 33.2 G/DL — SIGNIFICANT CHANGE UP (ref 32–37)
MCV RBC AUTO: 82.1 FL — SIGNIFICANT CHANGE UP (ref 80–94)
MONOCYTES # BLD AUTO: 0.97 K/UL — HIGH (ref 0.1–0.6)
MONOCYTES NFR BLD AUTO: 12.3 % — HIGH (ref 1.7–9.3)
NEUTROPHILS # BLD AUTO: 5.86 K/UL — SIGNIFICANT CHANGE UP (ref 1.4–6.5)
NEUTROPHILS NFR BLD AUTO: 74.3 % — SIGNIFICANT CHANGE UP (ref 42.2–75.2)
NRBC # BLD: 0 /100 WBCS — SIGNIFICANT CHANGE UP (ref 0–0)
PLATELET # BLD AUTO: 394 K/UL — SIGNIFICANT CHANGE UP (ref 130–400)
PMV BLD: 10.2 FL — SIGNIFICANT CHANGE UP (ref 7.4–10.4)
POTASSIUM SERPL-MCNC: 2.7 MMOL/L — CRITICAL LOW (ref 3.5–5)
POTASSIUM SERPL-SCNC: 2.7 MMOL/L — CRITICAL LOW (ref 3.5–5)
PROT SERPL-MCNC: 7.6 G/DL — SIGNIFICANT CHANGE UP (ref 6–8)
RBC # BLD: 3.74 M/UL — LOW (ref 4.7–6.1)
RBC # FLD: 19.3 % — HIGH (ref 11.5–14.5)
SODIUM SERPL-SCNC: 139 MMOL/L — SIGNIFICANT CHANGE UP (ref 135–146)
SPECIMEN SOURCE: SIGNIFICANT CHANGE UP
WBC # BLD: 7.88 K/UL — SIGNIFICANT CHANGE UP (ref 4.8–10.8)
WBC # FLD AUTO: 7.88 K/UL — SIGNIFICANT CHANGE UP (ref 4.8–10.8)

## 2024-10-09 PROCEDURE — G0378: CPT

## 2024-10-09 PROCEDURE — 82533 TOTAL CORTISOL: CPT

## 2024-10-09 PROCEDURE — 82010 KETONE BODYS QUAN: CPT

## 2024-10-09 PROCEDURE — 82962 GLUCOSE BLOOD TEST: CPT

## 2024-10-09 PROCEDURE — 36415 COLL VENOUS BLD VENIPUNCTURE: CPT

## 2024-10-09 PROCEDURE — 84681 ASSAY OF C-PEPTIDE: CPT

## 2024-10-09 PROCEDURE — 84206 ASSAY OF PROINSULIN: CPT

## 2024-10-09 PROCEDURE — 80053 COMPREHEN METABOLIC PANEL: CPT

## 2024-10-09 PROCEDURE — 80377 DRUG/SUBSTANCE NOS 7/MORE: CPT

## 2024-10-09 PROCEDURE — 93010 ELECTROCARDIOGRAM REPORT: CPT

## 2024-10-09 PROCEDURE — 85025 COMPLETE CBC W/AUTO DIFF WBC: CPT

## 2024-10-09 PROCEDURE — 70450 CT HEAD/BRAIN W/O DYE: CPT | Mod: 26,MC

## 2024-10-09 PROCEDURE — 84443 ASSAY THYROID STIM HORMONE: CPT

## 2024-10-09 PROCEDURE — 80048 BASIC METABOLIC PNL TOTAL CA: CPT

## 2024-10-09 PROCEDURE — 99291 CRITICAL CARE FIRST HOUR: CPT

## 2024-10-09 PROCEDURE — 83735 ASSAY OF MAGNESIUM: CPT

## 2024-10-09 RX ADMIN — Medication 40 MILLIEQUIVALENT(S): at 22:30

## 2024-10-09 RX ADMIN — Medication 50 MILLIEQUIVALENT(S): at 22:30

## 2024-10-09 NOTE — ED PROVIDER NOTE - CADM POA CENTRAL LINE
Elevate extremity/No tub baths/No douching/Nothing per vagina/No intercourse/No tampons/Nothing per rectum/Exercise/No heavy lifting/Weight bearing as tolerated No

## 2024-10-09 NOTE — ED ADULT TRIAGE NOTE - CHIEF COMPLAINT QUOTE
BIBEMS for low blood sugar. Pt had R side weakness & R side facial droop @ 5pm with bgl 42. Pt BGL 80 @ this time w/ resolved symptoms. Cleared by Crit. Pt takes metformin.

## 2024-10-09 NOTE — ED PROVIDER NOTE - PHYSICAL EXAMINATION
Physical Exam    Vital Signs: I have reviewed the initial vital signs.  Constitutional: well-nourished, appears stated age, no acute distress  Eyes: Conjunctiva pink, Sclera clear, PERRLA, EOMI without pain. no nystagmus.   Cardiovascular: regular rate, regular rhythm, well-perfused extremities, radial pulses equal and 2+ b/l.   Respiratory: unlabored respiratory effort, clear to auscultation bilaterally no wheezing, rales and rhonchi. pt is speaking full sentences. no accessory muscle use.   Gastrointestinal: soft, non-tender, nondistended abdomen, no pulsatile mass, no rebound, no guarding  Musculoskeletal: FROM of b/l upper and lower extremities, no lower extremity edema, no calf tenderness  Integumentary: warm, dry, no rash  Neurologic: awake, alert, cranial nerves II-XII grossly intact, extremities’ motor and sensory functions grossly intact. finger to nose intact. negative pronator drift. 5/5 strength in the b/l upper and lower extremities.   Psychiatric: appropriate mood, appropriate affect

## 2024-10-09 NOTE — ED PROVIDER NOTE - OBJECTIVE STATEMENT
64-year-old male with a past medical history of stage IV colon cancer with mets to the liver followed by oncologist Dr. Patel on CAPOX plus GENET regimen and Capecitabine, hypertension, hyperlipidemia, and diabetes on metformin-glipizide presents to the ED for evaluation of low blood sugar associated with right-sided facial droop, and right-sided upper and lower extremity weakness that began around 4 PM.  Patient reports he was sitting at home and developed weakness in the right upper and lower extremity, and his wife noticed that he was not able to talk and had a right-sided facial droop.  EMS was called and on the scene patient's fingerstick was 42.  As per patient's wife patient was given "2 glucose" by EMS and his fingerstick went up to 46 so that she gave him some Sprite.  Patient's wife reports after patient was given glucose and his sugar started to improve his symptoms started to resolve.  Patient reports now his symptoms have resolved and is speaking full sentences and moving the bilateral upper and lower extremities.  Patient was recently admitted from September 30 to October 2 for fever. Patient denies headache, dizziness, visual changes, recent head trauma, use of blood thinners, neck pain, back pain, chest pain, shortness of breath, abdominal pain, nausea, vomiting, diarrhea, constipation, urinary or bowel retention or incontinence, or weakness.

## 2024-10-09 NOTE — ED PROVIDER NOTE - CLINICAL SUMMARY MEDICAL DECISION MAKING FREE TEXT BOX
64-year-old male presented today for evaluation of of low blood sugar and facial droop.  Symptoms had resolved after administration of dextrose.  Patient is on a sulfonylurea medication and blood glucose was stable however patient noted to have severe hypokalemia and will require admission for electrolyte and glucose monitoring.  Patient was admitted for further evaluation and care.    Attending Statement: I have personally provided the amount of critical care time documented below excluding time spent on separate procedures.     Critical Care Time Spent (min) Must be 30 or more minutes to qualify: 35.

## 2024-10-09 NOTE — ED ADULT TRIAGE NOTE - NS ED TRIAGE AVPU SCALE
Alert-The patient is alert, awake and responds to voice. The patient is oriented to time, place, and person. The triage nurse is able to obtain subjective information. [id number]

## 2024-10-10 PROBLEM — C18.9 MALIGNANT NEOPLASM OF COLON, UNSPECIFIED: Chronic | Status: ACTIVE | Noted: 2024-09-29

## 2024-10-10 PROBLEM — R18.8 OTHER ASCITES: Chronic | Status: ACTIVE | Noted: 2024-09-29

## 2024-10-10 LAB
ALBUMIN SERPL ELPH-MCNC: 3.6 G/DL — SIGNIFICANT CHANGE UP (ref 3.5–5.2)
ALP SERPL-CCNC: 262 U/L — HIGH (ref 30–115)
ALT FLD-CCNC: 20 U/L — SIGNIFICANT CHANGE UP (ref 0–41)
ANION GAP SERPL CALC-SCNC: 13 MMOL/L — SIGNIFICANT CHANGE UP (ref 7–14)
ANION GAP SERPL CALC-SCNC: 8 MMOL/L — SIGNIFICANT CHANGE UP (ref 7–14)
AST SERPL-CCNC: 52 U/L — HIGH (ref 0–41)
B-OH-BUTYR SERPL-SCNC: <0.2 MMOL/L — SIGNIFICANT CHANGE UP
BASOPHILS # BLD AUTO: 0.05 K/UL — SIGNIFICANT CHANGE UP (ref 0–0.2)
BASOPHILS NFR BLD AUTO: 0.6 % — SIGNIFICANT CHANGE UP (ref 0–1)
BILIRUB SERPL-MCNC: 0.6 MG/DL — SIGNIFICANT CHANGE UP (ref 0.2–1.2)
BUN SERPL-MCNC: 8 MG/DL — LOW (ref 10–20)
BUN SERPL-MCNC: 9 MG/DL — LOW (ref 10–20)
CALCIUM SERPL-MCNC: 8.9 MG/DL — SIGNIFICANT CHANGE UP (ref 8.4–10.5)
CALCIUM SERPL-MCNC: 9 MG/DL — SIGNIFICANT CHANGE UP (ref 8.4–10.4)
CHLORIDE SERPL-SCNC: 88 MMOL/L — LOW (ref 98–110)
CHLORIDE SERPL-SCNC: 90 MMOL/L — LOW (ref 98–110)
CO2 SERPL-SCNC: 31 MMOL/L — SIGNIFICANT CHANGE UP (ref 17–32)
CO2 SERPL-SCNC: 32 MMOL/L — SIGNIFICANT CHANGE UP (ref 17–32)
CREAT SERPL-MCNC: 0.7 MG/DL — SIGNIFICANT CHANGE UP (ref 0.7–1.5)
CREAT SERPL-MCNC: 0.8 MG/DL — SIGNIFICANT CHANGE UP (ref 0.7–1.5)
EGFR: 102 ML/MIN/1.73M2 — SIGNIFICANT CHANGE UP
EGFR: 98 ML/MIN/1.73M2 — SIGNIFICANT CHANGE UP
EOSINOPHIL # BLD AUTO: 0.05 K/UL — SIGNIFICANT CHANGE UP (ref 0–0.7)
EOSINOPHIL NFR BLD AUTO: 0.6 % — SIGNIFICANT CHANGE UP (ref 0–8)
GLUCOSE BLDC GLUCOMTR-MCNC: 105 MG/DL — HIGH (ref 70–99)
GLUCOSE BLDC GLUCOMTR-MCNC: 118 MG/DL — HIGH (ref 70–99)
GLUCOSE BLDC GLUCOMTR-MCNC: 127 MG/DL — HIGH (ref 70–99)
GLUCOSE BLDC GLUCOMTR-MCNC: 130 MG/DL — HIGH (ref 70–99)
GLUCOSE BLDC GLUCOMTR-MCNC: 131 MG/DL — HIGH (ref 70–99)
GLUCOSE BLDC GLUCOMTR-MCNC: 144 MG/DL — HIGH (ref 70–99)
GLUCOSE BLDC GLUCOMTR-MCNC: 55 MG/DL — LOW (ref 70–99)
GLUCOSE BLDC GLUCOMTR-MCNC: 57 MG/DL — LOW (ref 70–99)
GLUCOSE BLDC GLUCOMTR-MCNC: 84 MG/DL — SIGNIFICANT CHANGE UP (ref 70–99)
GLUCOSE BLDC GLUCOMTR-MCNC: 95 MG/DL — SIGNIFICANT CHANGE UP (ref 70–99)
GLUCOSE BLDC GLUCOMTR-MCNC: 96 MG/DL — SIGNIFICANT CHANGE UP (ref 70–99)
GLUCOSE BLDC GLUCOMTR-MCNC: 98 MG/DL — SIGNIFICANT CHANGE UP (ref 70–99)
GLUCOSE SERPL-MCNC: 120 MG/DL — HIGH (ref 70–99)
GLUCOSE SERPL-MCNC: 132 MG/DL — HIGH (ref 70–99)
HCT VFR BLD CALC: 24.6 % — LOW (ref 42–52)
HGB BLD-MCNC: 8.1 G/DL — LOW (ref 14–18)
IMM GRANULOCYTES NFR BLD AUTO: 0.4 % — HIGH (ref 0.1–0.3)
LYMPHOCYTES # BLD AUTO: 1.03 K/UL — LOW (ref 1.2–3.4)
LYMPHOCYTES # BLD AUTO: 13.1 % — LOW (ref 20.5–51.1)
MAGNESIUM SERPL-MCNC: 2.1 MG/DL — SIGNIFICANT CHANGE UP (ref 1.8–2.4)
MAGNESIUM SERPL-MCNC: SIGNIFICANT CHANGE UP MG/DL (ref 1.8–2.4)
MCHC RBC-ENTMCNC: 27.6 PG — SIGNIFICANT CHANGE UP (ref 27–31)
MCHC RBC-ENTMCNC: 32.9 G/DL — SIGNIFICANT CHANGE UP (ref 32–37)
MCV RBC AUTO: 83.7 FL — SIGNIFICANT CHANGE UP (ref 80–94)
MONOCYTES # BLD AUTO: 0.92 K/UL — HIGH (ref 0.1–0.6)
MONOCYTES NFR BLD AUTO: 11.7 % — HIGH (ref 1.7–9.3)
NEUTROPHILS # BLD AUTO: 5.77 K/UL — SIGNIFICANT CHANGE UP (ref 1.4–6.5)
NEUTROPHILS NFR BLD AUTO: 73.6 % — SIGNIFICANT CHANGE UP (ref 42.2–75.2)
NRBC # BLD: 0 /100 WBCS — SIGNIFICANT CHANGE UP (ref 0–0)
PLATELET # BLD AUTO: 446 K/UL — HIGH (ref 130–400)
PMV BLD: 10.4 FL — SIGNIFICANT CHANGE UP (ref 7.4–10.4)
POTASSIUM SERPL-MCNC: 2.9 MMOL/L — LOW (ref 3.5–5)
POTASSIUM SERPL-MCNC: SIGNIFICANT CHANGE UP MMOL/L (ref 3.5–5)
POTASSIUM SERPL-SCNC: 2.9 MMOL/L — LOW (ref 3.5–5)
POTASSIUM SERPL-SCNC: SIGNIFICANT CHANGE UP MMOL/L (ref 3.5–5)
PROT SERPL-MCNC: 7.3 G/DL — SIGNIFICANT CHANGE UP (ref 6–8)
RBC # BLD: 2.94 M/UL — LOW (ref 4.7–6.1)
RBC # FLD: 19.6 % — HIGH (ref 11.5–14.5)
SODIUM SERPL-SCNC: 128 MMOL/L — LOW (ref 135–146)
SODIUM SERPL-SCNC: 134 MMOL/L — LOW (ref 135–146)
TSH SERPL-MCNC: 2.89 UIU/ML — SIGNIFICANT CHANGE UP (ref 0.27–4.2)
TSH SERPL-MCNC: 3.38 UIU/ML — SIGNIFICANT CHANGE UP (ref 0.27–4.2)
WBC # BLD: 7.85 K/UL — SIGNIFICANT CHANGE UP (ref 4.8–10.8)
WBC # FLD AUTO: 7.85 K/UL — SIGNIFICANT CHANGE UP (ref 4.8–10.8)

## 2024-10-10 PROCEDURE — 99233 SBSQ HOSP IP/OBS HIGH 50: CPT

## 2024-10-10 RX ORDER — ALCOHOL ANTISEPTIC PADS
15 PADS, MEDICATED (EA) TOPICAL ONCE
Refills: 0 | Status: DISCONTINUED | OUTPATIENT
Start: 2024-10-10 | End: 2024-10-11

## 2024-10-10 RX ORDER — ALCOHOL ANTISEPTIC PADS
25 PADS, MEDICATED (EA) TOPICAL ONCE
Refills: 0 | Status: DISCONTINUED | OUTPATIENT
Start: 2024-10-10 | End: 2024-10-11

## 2024-10-10 RX ORDER — ENOXAPARIN SODIUM 150 MG/ML
40 INJECTION SUBCUTANEOUS EVERY 24 HOURS
Refills: 0 | Status: DISCONTINUED | OUTPATIENT
Start: 2024-10-10 | End: 2024-10-11

## 2024-10-10 RX ORDER — ALCOHOL ANTISEPTIC PADS
50 PADS, MEDICATED (EA) TOPICAL ONCE
Refills: 0 | Status: DISCONTINUED | OUTPATIENT
Start: 2024-10-10 | End: 2024-10-11

## 2024-10-10 RX ORDER — FUROSEMIDE 10 MG/ML
40 INJECTION INTRAVENOUS DAILY
Refills: 0 | Status: DISCONTINUED | OUTPATIENT
Start: 2024-10-10 | End: 2024-10-11

## 2024-10-10 RX ORDER — GLUCAGON INJECTION, SOLUTION 0.5 MG/.1ML
1 INJECTION, SOLUTION SUBCUTANEOUS ONCE
Refills: 0 | Status: DISCONTINUED | OUTPATIENT
Start: 2024-10-10 | End: 2024-10-11

## 2024-10-10 RX ORDER — ISOLEUCINE, LEUCINE, LYSINE ACETATE, METHIONINE, PHENYLALANINE, THREONINE, TRYPTOPHAN, VALINE, ALANINE, ARGININE, ASPARTIC ACID, GLUTAMIC ACID, HISTIDINE, PROLINE, SERINE, N-ACETYLTYROSINE, AND GLYCINE 660; 1000; 1050; 172; 298; 400; 200; 500; 993; 1018; 700; 738; 300; 722; 530; 270; 500 MG/100ML; MG/100ML; MG/100ML; MG/100ML; MG/100ML; MG/100ML; MG/100ML; MG/100ML; MG/100ML; MG/100ML; MG/100ML; MG/100ML; MG/100ML; MG/100ML; MG/100ML; MG/100ML; MG/100ML
1000 INJECTION, SOLUTION INTRAVENOUS
Refills: 0 | Status: DISCONTINUED | OUTPATIENT
Start: 2024-10-10 | End: 2024-10-10

## 2024-10-10 RX ORDER — BENZONATATE 150 MG/1
100 CAPSULE ORAL EVERY 8 HOURS
Refills: 0 | Status: DISCONTINUED | OUTPATIENT
Start: 2024-10-10 | End: 2024-10-11

## 2024-10-10 RX ORDER — ALCOHOL ANTISEPTIC PADS
12.5 PADS, MEDICATED (EA) TOPICAL ONCE
Refills: 0 | Status: DISCONTINUED | OUTPATIENT
Start: 2024-10-10 | End: 2024-10-11

## 2024-10-10 RX ORDER — DRONABINOL 5 MG/1
5 CAPSULE ORAL DAILY
Refills: 0 | Status: DISCONTINUED | OUTPATIENT
Start: 2024-10-10 | End: 2024-10-11

## 2024-10-10 RX ORDER — ALCOHOL ANTISEPTIC PADS
50 PADS, MEDICATED (EA) TOPICAL ONCE
Refills: 0 | Status: COMPLETED | OUTPATIENT
Start: 2024-10-10 | End: 2024-10-10

## 2024-10-10 RX ORDER — ASPIRIN 325 MG
81 TABLET ORAL DAILY
Refills: 0 | Status: DISCONTINUED | OUTPATIENT
Start: 2024-10-10 | End: 2024-10-11

## 2024-10-10 RX ORDER — ATORVASTATIN CALCIUM 10 MG/1
20 TABLET, FILM COATED ORAL AT BEDTIME
Refills: 0 | Status: DISCONTINUED | OUTPATIENT
Start: 2024-10-10 | End: 2024-10-11

## 2024-10-10 RX ORDER — SODIUM CHLORIDE IRRIG SOLUTION 0.9 %
1000 SOLUTION, IRRIGATION IRRIGATION
Refills: 0 | Status: DISCONTINUED | OUTPATIENT
Start: 2024-10-10 | End: 2024-10-11

## 2024-10-10 RX ORDER — PANTOPRAZOLE SODIUM 40 MG/1
40 TABLET, DELAYED RELEASE ORAL
Refills: 0 | Status: DISCONTINUED | OUTPATIENT
Start: 2024-10-10 | End: 2024-10-11

## 2024-10-10 RX ORDER — MAGNESIUM SULFATE 500 MG/ML
2 VIAL (ML) INJECTION ONCE
Refills: 0 | Status: COMPLETED | OUTPATIENT
Start: 2024-10-10 | End: 2024-10-10

## 2024-10-10 RX ADMIN — Medication 25 GRAM(S): at 11:19

## 2024-10-10 RX ADMIN — Medication 50 MILLIEQUIVALENT(S): at 21:56

## 2024-10-10 RX ADMIN — FUROSEMIDE 40 MILLIGRAM(S): 10 INJECTION INTRAVENOUS at 05:10

## 2024-10-10 RX ADMIN — Medication 50 MILLIEQUIVALENT(S): at 08:41

## 2024-10-10 RX ADMIN — PANTOPRAZOLE SODIUM 40 MILLIGRAM(S): 40 TABLET, DELAYED RELEASE ORAL at 08:40

## 2024-10-10 RX ADMIN — Medication 50 MILLIEQUIVALENT(S): at 17:09

## 2024-10-10 RX ADMIN — ISOLEUCINE, LEUCINE, LYSINE ACETATE, METHIONINE, PHENYLALANINE, THREONINE, TRYPTOPHAN, VALINE, ALANINE, ARGININE, ASPARTIC ACID, GLUTAMIC ACID, HISTIDINE, PROLINE, SERINE, N-ACETYLTYROSINE, AND GLYCINE 50 MILLILITER(S)
660; 1000; 1050; 172; 298; 400; 200; 500; 993; 1018; 700; 738; 300; 722; 530; 270; 500 INJECTION, SOLUTION INTRAVENOUS at 04:51

## 2024-10-10 RX ADMIN — ENOXAPARIN SODIUM 40 MILLIGRAM(S): 150 INJECTION SUBCUTANEOUS at 08:41

## 2024-10-10 RX ADMIN — Medication 50 MILLILITER(S): at 04:39

## 2024-10-10 RX ADMIN — Medication 81 MILLIGRAM(S): at 11:20

## 2024-10-10 RX ADMIN — Medication 50 MILLIEQUIVALENT(S): at 11:19

## 2024-10-10 RX ADMIN — Medication 50 MILLILITER(S): at 02:20

## 2024-10-10 RX ADMIN — Medication 40 MILLIEQUIVALENT(S): at 18:47

## 2024-10-10 RX ADMIN — Medication 20 MILLIEQUIVALENT(S): at 18:08

## 2024-10-10 RX ADMIN — ATORVASTATIN CALCIUM 20 MILLIGRAM(S): 10 TABLET, FILM COATED ORAL at 21:57

## 2024-10-10 RX ADMIN — DRONABINOL 5 MILLIGRAM(S): 5 CAPSULE ORAL at 17:09

## 2024-10-10 RX ADMIN — Medication 50 MILLIEQUIVALENT(S): at 23:00

## 2024-10-10 NOTE — H&P ADULT - ASSESSMENT
IMPRESSION:    #Hypoglycemia   #Colon cancer Stage IV with mets to liver, peritoneum and lungs  #HTN   #DM      PLAN:    CNS: avoid sedatives, CT head neg, Consult neuro if changes recur.     HEENT: Oral care    PULMONARY:  HOB @ 45 degrees.  Aspiration precautions, pt is on RA    CARDIOVASCULAR: Keep MAP~65,     GI: GI prophylaxis N/A.  Feeding.  Bowel regimen     RENAL:  Follow up lytes.  Correct as needed    INFECTIOUS DISEASE: No infectious work up needed    HEMATOLOGICAL:  trend CBC for Hb and WBC count, lovenox for DVT prophylaxis,     ENDOCRINE: C/W D10 drip, Q1 FS until hypoglycemia is resolved, F/U hypoglycemia screen, F/U C-peptide levels    MUSCULOSKELETAL: IAT, Offloading.        IMPRESSION:  #Hypoglycemia   #Facial weakness resolved   #Colon cancer Stage IV with mets to liver, peritoneum and lungs on   #HTN   #DM      PLAN:    CNS: avoid sedatives, CT head neg, Consult neuro if changes recur.     HEENT: Oral care    PULMONARY:  HOB @ 45 degrees.  Aspiration precautions, pt is on RA    CARDIOVASCULAR: Keep MAP~65,     GI: GI prophylaxis N/A. Diet - reg,  Bowel regimen if needed.     RENAL: F/U urine drug screen, Follow up lytes.  Correct as needed    INFECTIOUS DISEASE: Monitor for signs of infection, No infectious work up needed for now.    HEMATOLOGICAL:  trend CBC for Hb and WBC count, lovenox for DVT prophylaxis,     ENDOCRINE: C/W D10 drip, Q1 FS until hypoglycemia is resolved, F/U hypoglycemia screen, F/U C-peptide, pro insulin levels, Pt is on sulfonylureas at home    MUSCULOSKELETAL: IAT, Offloading.        IMPRESSION:  #Hypoglycemia   #Facial weakness and leg weakness resolved   #Colon cancer Stage IV with mets to liver, peritoneum and lungs   #HTN   #DM      PLAN:    CNS: avoid sedatives, CT head neg, Weakness resolved, Consult neuro if changes recur.     HEENT: Oral care    PULMONARY:  HOB @ 45 degrees.  Aspiration precautions, pt is on RA    CARDIOVASCULAR: Keep MAP~65, avoid overload.     GI: GI prophylaxis N/A. Diet - reg,  Bowel regimen if needed.     RENAL: F/U urine drug screen, Follow up lytes.  Correct as needed    INFECTIOUS DISEASE: Monitor for signs of infection, No infectious work up needed for now.    HEMATOLOGICAL:  trend CBC for Hb and WBC count, lovenox for DVT prophylaxis,     ENDOCRINE: C/W D10 drip, Q1 FS until hypoglycemia is resolved, F/U hypoglycemia screen, F/U C-peptide, pro insulin levels.     MUSCULOSKELETAL: IAT, Offloading.

## 2024-10-10 NOTE — CONSULT NOTE ADULT - ASSESSMENT
IMPRESSION:    #Hypoglycemia   #Facial weakness and leg weakness resolved   #Colon cancer Stage IV with mets to liver, peritoneum and lungs   #HTN   #DM      PLAN:    CNS: avoid sedatives, CT head neg, Weakness resolved.     HEENT: Oral care    PULMONARY:  HOB @ 45 degrees.  Aspiration precautions, pt is on RA.     CARDIOVASCULAR: Keep MAP~65, avoid overload. Not on pressors.     GI: GI prophylaxis. Diet order,  Bowel regimen if needed.     RENAL: F/U urine drug screen, Follow up lytes.  Replete K and Mg. CMP at 11 H am.    INFECTIOUS DISEASE: Monitor for signs of infection, No infectious work up needed for now.    HEMATOLOGICAL:  Lovenox for DVT ppx.     ENDOCRINE: Oral diet; Insulin and C peptide level; Cortisol level. TSH. If FS ok then DC D10.     MUSCULOSKELETAL: Out of bed.     Possible medical floor today.

## 2024-10-10 NOTE — CONSULT NOTE ADULT - SUBJECTIVE AND OBJECTIVE BOX
Patient is a 64y old  Male who presents with a chief complaint of Hypoglycemia (10 Oct 2024 03:31)        Over Night Events:        ROS:  See HPI    PHYSICAL EXAM    ICU Vital Signs Last 24 Hrs  T(C): 36.4 (10 Oct 2024 05:24), Max: 36.9 (09 Oct 2024 23:47)  T(F): 97.6 (10 Oct 2024 05:24), Max: 98.4 (09 Oct 2024 23:47)  HR: 87 (10 Oct 2024 06:24) (87 - 99)  BP: 103/65 (10 Oct 2024 06:24) (103/65 - 135/69)  BP(mean): 79 (10 Oct 2024 06:24) (79 - 87)  ABP: --  ABP(mean): --  RR: 17 (10 Oct 2024 06:24) (17 - 23)  SpO2: 95% (10 Oct 2024 06:24) (95% - 99%)    O2 Parameters below as of 10 Oct 2024 06:24  Patient On (Oxygen Delivery Method): room air            General: NAD   HEENT: FLORENTINO             Lymphatic system: No cervical LN   Lungs: Bilateral BS  Cardiovascular: Regular   Gastrointestinal: Soft, Positive BS  Extremities: No clubbing.  Moves extremities.  Full Range of motion. Edema.   Skin: Warm, intact  Neurological: No motor or sensory deficit       10-09-24 @ 07:01  -  10-10-24 @ 07:00  --------------------------------------------------------  IN:    dextrose 10%: 150 mL  Total IN: 150 mL    OUT:    Voided (mL): 400 mL  Total OUT: 400 mL    Total NET: -250 mL          LABS:                            10.2   7.88  )-----------( 394      ( 09 Oct 2024 20:15 )             30.7                                               10-09    139  |  94[L]  |  11  ----------------------------<  61[L]  2.7[LL]   |  29  |  0.7    Ca    9.5      09 Oct 2024 20:15  Mg     1.8     10-09    TPro  7.6  /  Alb  4.0  /  TBili  0.6  /  DBili  x   /  AST  64[H]  /  ALT  23  /  AlkPhos  292[H]  10-09                                             Urinalysis Basic - ( 09 Oct 2024 20:15 )    Color: x / Appearance: x / SG: x / pH: x  Gluc: 61 mg/dL / Ketone: x  / Bili: x / Urobili: x   Blood: x / Protein: x / Nitrite: x   Leuk Esterase: x / RBC: x / WBC x   Sq Epi: x / Non Sq Epi: x / Bacteria: x                                                  LIVER FUNCTIONS - ( 09 Oct 2024 20:15 )  Alb: 4.0 g/dL / Pro: 7.6 g/dL / ALK PHOS: 292 U/L / ALT: 23 U/L / AST: 64 U/L / GGT: x                                                                                                                                       MEDICATIONS  (STANDING):  aspirin enteric coated 81 milliGRAM(s) Oral daily  atorvastatin 20 milliGRAM(s) Oral at bedtime  dextrose 10%. 1000 milliLiter(s) (50 mL/Hr) IV Continuous <Continuous>  dextrose 5%. 1000 milliLiter(s) (50 mL/Hr) IV Continuous <Continuous>  dextrose 50% Injectable 25 Gram(s) IV Push once  dextrose 50% Injectable 12.5 Gram(s) IV Push once  dextrose 50% Injectable 25 Gram(s) IV Push once  dextrose Oral Gel 15 Gram(s) Oral once  enoxaparin Injectable 40 milliGRAM(s) SubCutaneous every 24 hours  furosemide    Tablet 40 milliGRAM(s) Oral daily  glucagon  Injectable 1 milliGRAM(s) IntraMuscular once  hydrochlorothiazide 12.5 milliGRAM(s) Oral daily  NIFEdipine XL 90 milliGRAM(s) Oral daily  pantoprazole    Tablet 40 milliGRAM(s) Oral before breakfast  potassium chloride  20 mEq/100 mL IVPB 20 milliEquivalent(s) IV Intermittent every 2 hours    MEDICATIONS  (PRN):  benzonatate 100 milliGRAM(s) Oral every 8 hours PRN for cough  dextrose 50% Injectable 50 milliLiter(s) IV Push once PRN Hypoglycemia      Xrays:                                                                                     ECHO

## 2024-10-10 NOTE — PROGRESS NOTE ADULT - SUBJECTIVE AND OBJECTIVE BOX
SUBJECTIVE/OVERNIGHT EVENTS  Today is hospital day 1d. This morning patient was seen and examined at bedside, resting comfortably in bed. No acute or major events overnight.    MEDICATIONS  STANDING MEDICATIONS  aspirin enteric coated 81 milliGRAM(s) Oral daily  atorvastatin 20 milliGRAM(s) Oral at bedtime  dextrose 10%. 1000 milliLiter(s) IV Continuous <Continuous>  dextrose 5%. 1000 milliLiter(s) IV Continuous <Continuous>  dextrose 50% Injectable 25 Gram(s) IV Push once  dextrose 50% Injectable 12.5 Gram(s) IV Push once  dextrose 50% Injectable 25 Gram(s) IV Push once  dextrose Oral Gel 15 Gram(s) Oral once  enoxaparin Injectable 40 milliGRAM(s) SubCutaneous every 24 hours  furosemide    Tablet 40 milliGRAM(s) Oral daily  glucagon  Injectable 1 milliGRAM(s) IntraMuscular once  hydrochlorothiazide 12.5 milliGRAM(s) Oral daily  magnesium sulfate  IVPB 2 Gram(s) IV Intermittent once  NIFEdipine XL 90 milliGRAM(s) Oral daily  pantoprazole    Tablet 40 milliGRAM(s) Oral before breakfast  potassium chloride  20 mEq/100 mL IVPB 20 milliEquivalent(s) IV Intermittent every 2 hours    PRN MEDICATIONS  benzonatate 100 milliGRAM(s) Oral every 8 hours PRN  dextrose 50% Injectable 50 milliLiter(s) IV Push once PRN    VITALS  T(F): 97.6 (10-10-24 @ 05:24), Max: 98.4 (10-09-24 @ 23:47)  HR: 87 (10-10-24 @ 06:24) (87 - 99)  BP: 103/65 (10-10-24 @ 06:24) (103/65 - 135/69)  RR: 17 (10-10-24 @ 06:24) (17 - 23)  SpO2: 95% (10-10-24 @ 06:24) (95% - 99%)  POCT Blood Glucose.: 95 mg/dL (10-10-24 @ 07:28)  POCT Blood Glucose.: 98 mg/dL (10-10-24 @ 06:24)  POCT Blood Glucose.: 96 mg/dL (10-10-24 @ 05:23)  POCT Blood Glucose.: 57 mg/dL (10-10-24 @ 04:29)  POCT Blood Glucose.: 131 mg/dL (10-10-24 @ 02:50)  POCT Blood Glucose.: 55 mg/dL (10-10-24 @ 02:00)  POCT Blood Glucose.: 84 mg/dL (10-10-24 @ 00:22)  POCT Blood Glucose.: 72 mg/dL (10-09-24 @ 22:37)  POCT Blood Glucose.: 122 mg/dL (10-09-24 @ 21:02)  POCT Blood Glucose.: 70 mg/dL (10-09-24 @ 20:09)  POCT Blood Glucose.: 80 mg/dL (10-09-24 @ 18:44)    PHYSICAL EXAM  PHYSICAL EXAM:  GEN: NAD, Resting comfortably  PULM: Clear to auscultation bilaterally, No wheezing, rales, rhonchi  CVS: Regular rate and rhythm, S1-S2, no murmurs, heaves, thrills  ABD: Soft, non-tender, non-distended, no guarding, BS +  EXT: b/l LE edema, extremities warm/dry   NEURO: A&Ox3, no focal deficits      LABS             10.2   7.88  )-----------( 394      ( 10-09-24 @ 20:15 )             30.7     139  |  94  |  11  -------------------------<  61   10-09-24 @ 20:15  2.7  |  29  |  0.7    Ca      9.5     10-09-24 @ 20:15  Mg     1.8     10-09-24 @ 20:15    TPro  7.6  /  Alb  4.0  /  TBili  0.6  /  DBili  x   /  AST  64  /  ALT  23  /  AlkPhos  292  /  GGT  x     10-09-24 @ 20:15        Urinalysis Basic - ( 09 Oct 2024 20:15 )    Color: x / Appearance: x / SG: x / pH: x  Gluc: 61 mg/dL / Ketone: x  / Bili: x / Urobili: x   Blood: x / Protein: x / Nitrite: x   Leuk Esterase: x / RBC: x / WBC x   Sq Epi: x / Non Sq Epi: x / Bacteria: x

## 2024-10-10 NOTE — CHART NOTE - NSCHARTNOTEFT_GEN_A_CORE
Transfer Note    Transfer from: CCU    Transfer to: ( x ) Medicine    (  ) Telemetry    (  ) RCU                               (  ) Palliative    (  ) Stroke Unit    (  ) MICU    (  ) STEPDOWN      HOSPITAL COURSE:  HPI:  Patient is a 64-year-old male with a past medical history of stage IV colon cancer with mets to the liver follows with oncologist Dr. Patel on CAPOX plus GENET regimen and Capecitabine (started recently), hypertension, hyperlipidemia, and diabetes presents to the ED for evaluation of low blood sugar associated with right-sided facial droop, and right-sided upper and lower extremity weakness that began around 4 PM today. Pt was recently admitted for fever of unknown origin, finished a course of zosyn and had a paracentesis before DC.     Today patient reports he was sitting at home and developed weakness in the right upper and lower extremity, and his wife noticed that he was not able to talk and had a right-sided facial droop.  EMS was called and on the scene patient's fingerstick was 42. Patient's wife reports after patient was given glucose by EMS and his sugar started to improve his symptoms started to resolve.      On arrival to the ED, Patient reports now his symptoms have resolved and is speaking full sentences and moving the bilateral upper and lower extremities. Patient denies headache, dizziness, visual changes, recent head trauma, use of blood thinners, neck pain, back pain, chest pain, shortness of breath, abdominal pain, nausea, vomiting, diarrhea, constipation, urinary or bowel retention or incontinence, or weakness.    At triage,   · Temp at ED Arrival (C)	36.6 Degrees C  · SpO2 (%)	99 %  · Temp (C)	36.6 Degrees C  · Temp (F)	97.8 Degrees F  · Respiration Rate (breaths/min)	18 /min  · Heart Rate	97 /min  · BP Diastolic	78 mm Hg  · BP Systolic	131 mm Hg    Labs; no neutropenia, FS ranging from 50s to 130, K 2.7.     CT head neg     In the ED, he received 1 D50 push and was started on D10 drip & K repleted.     Admitted to ICU for closer monitoring and q1 FS checks.  (10 Oct 2024 03:31)    In the ICU: Pt's Mg repleted, finger sticks normalized on D50. Diet was started. D10 drip weaning as tolerated. Sending c-peptide, pro-insulin, random cortisol, TSH.     Vital Signs Last 24 Hrs  T(C): 36.4 (10 Oct 2024 05:24), Max: 36.9 (09 Oct 2024 23:47)  T(F): 97.6 (10 Oct 2024 05:24), Max: 98.4 (09 Oct 2024 23:47)  HR: 87 (10 Oct 2024 06:24) (87 - 99)  BP: 103/65 (10 Oct 2024 06:24) (103/65 - 135/69)  BP(mean): 79 (10 Oct 2024 06:24) (79 - 87)  RR: 17 (10 Oct 2024 06:24) (17 - 23)  SpO2: 95% (10 Oct 2024 06:24) (95% - 99%)    Parameters below as of 10 Oct 2024 06:24  Patient On (Oxygen Delivery Method): room air        I&O's Summary    09 Oct 2024 07:01  -  10 Oct 2024 07:00  --------------------------------------------------------  IN: 150 mL / OUT: 400 mL / NET: -250 mL        Physical Exam    PHYSICAL EXAM:  GEN: NAD, Resting comfortably  PULM: Clear to auscultation bilaterally, No wheezing, rales, rhonchi  CVS: Regular rate and rhythm, S1-S2, no murmurs, heaves, thrills  ABD: Soft, non-tender, non-distended, no guarding, BS +  EXT: b/l LE edema, extremities warm/dry   NEURO: A&Ox3, no focal deficits        LABS:                               10.2   7.88  )-----------( 394      ( 09 Oct 2024 20:15 )             30.7       10-09    139  |  94[L]  |  11  ----------------------------<  61[L]  2.7[LL]   |  29  |  0.7    Ca    9.5      09 Oct 2024 20:15  Mg     1.8     10-09    TPro  7.6  /  Alb  4.0  /  TBili  0.6  /  DBili  x   /  AST  64[H]  /  ALT  23  /  AlkPhos  292[H]  10-09              ASSESSMENT & PLAN:   64-year-old male with a past medical history of stage IV colon cancer with mets to the liver follows with oncologist Dr. Patel on CAPOX plus GENET regimen and Capecitabine (started recently), hypertension, hyperlipidemia, and diabetes presents to the ED for evaluation of low blood sugar associated with right-sided facial droop, and right-sided upper and lower extremity weakness that began around 4 PM today. Pt was recently admitted for fever of unknown origin, finished a course of zosyn and had a paracentesis before DC.       #Hypoglycemia   #Facial weakness and leg weakness resolved   #DM  - Hypoglycemic to 42 with EMS  - Pt received 2 pushes of D50 by EMS  - D10 drip started: DC if FS continue to be ok  - Diet started  - f/u Insulin and C peptide level; Cortisol level. TSH  - Home meds: Janumet, pioglitazone   - Fingersticks q1h: Fingersticks will be at meals and bedtime    #HTN  - Patient on home HCTZ 12.5, Nifedipine 60 mg ER    #Colon cancer Stage IV with mets to liver, peritoneum and lungs   - follows with oncologist Dr. Patel on CAPOX plus GENET regimen and Capecitabine (started recently)  - Recent admission for fever of unknown origin, finished a course of zosyn and had a paracentesis before DC    Diet: Regular   DVT ppx: Lovenox  GI ppx: protonix  Dispo: Floors    Pending:   - Monitor FS  - 11 am CMP, Mg  - f/u TSH, cortisol, proinsulin, C-peptide Transfer Note    Transfer from: CCU    Transfer to: ( x ) Medicine    (  ) Telemetry    (  ) RCU                               (  ) Palliative    (  ) Stroke Unit    (  ) MICU    (  ) STEPDOWN      HOSPITAL COURSE:  HPI:  Patient is a 64-year-old male with a past medical history of stage IV colon cancer with mets to the liver follows with oncologist Dr. Patel on CAPOX plus GENET regimen and Capecitabine (started recently), hypertension, hyperlipidemia, and diabetes presents to the ED for evaluation of low blood sugar associated with right-sided facial droop, and right-sided upper and lower extremity weakness that began around 4 PM today. Pt was recently admitted for fever of unknown origin, finished a course of zosyn and had a paracentesis before DC.     Today patient reports he was sitting at home and developed weakness in the right upper and lower extremity, and his wife noticed that he was not able to talk and had a right-sided facial droop.  EMS was called and on the scene patient's fingerstick was 42. Patient's wife reports after patient was given glucose by EMS and his sugar started to improve his symptoms started to resolve.      On arrival to the ED, Patient reports now his symptoms have resolved and is speaking full sentences and moving the bilateral upper and lower extremities. Patient denies headache, dizziness, visual changes, recent head trauma, use of blood thinners, neck pain, back pain, chest pain, shortness of breath, abdominal pain, nausea, vomiting, diarrhea, constipation, urinary or bowel retention or incontinence, or weakness.    At triage,   · Temp at ED Arrival (C)	36.6 Degrees C  · SpO2 (%)	99 %  · Temp (C)	36.6 Degrees C  · Temp (F)	97.8 Degrees F  · Respiration Rate (breaths/min)	18 /min  · Heart Rate	97 /min  · BP Diastolic	78 mm Hg  · BP Systolic	131 mm Hg    Labs; no neutropenia, FS ranging from 50s to 130, K 2.7.     CT head neg     In the ED, he received 1 D50 push and was started on D10 drip & K repleted.     Admitted to ICU for closer monitoring and q1 FS checks.  (10 Oct 2024 03:31)    In the ICU: Pt's Mg repleted, finger sticks normalized on D50. Diet was started. D10 drip weaning as tolerated. Sending c-peptide, pro-insulin, random cortisol, TSH.     Vital Signs Last 24 Hrs  T(C): 36.4 (10 Oct 2024 05:24), Max: 36.9 (09 Oct 2024 23:47)  T(F): 97.6 (10 Oct 2024 05:24), Max: 98.4 (09 Oct 2024 23:47)  HR: 87 (10 Oct 2024 06:24) (87 - 99)  BP: 103/65 (10 Oct 2024 06:24) (103/65 - 135/69)  BP(mean): 79 (10 Oct 2024 06:24) (79 - 87)  RR: 17 (10 Oct 2024 06:24) (17 - 23)  SpO2: 95% (10 Oct 2024 06:24) (95% - 99%)    Parameters below as of 10 Oct 2024 06:24  Patient On (Oxygen Delivery Method): room air        I&O's Summary    09 Oct 2024 07:01  -  10 Oct 2024 07:00  --------------------------------------------------------  IN: 150 mL / OUT: 400 mL / NET: -250 mL        Physical Exam    PHYSICAL EXAM:  GEN: NAD, Resting comfortably  PULM: Clear to auscultation bilaterally, No wheezing, rales, rhonchi  CVS: Regular rate and rhythm, S1-S2, no murmurs, heaves, thrills  ABD: Soft, non-tender, non-distended, no guarding, BS +  EXT: b/l LE edema, extremities warm/dry   NEURO: A&Ox3, no focal deficits        LABS:                               10.2   7.88  )-----------( 394      ( 09 Oct 2024 20:15 )             30.7       10-09    139  |  94[L]  |  11  ----------------------------<  61[L]  2.7[LL]   |  29  |  0.7    Ca    9.5      09 Oct 2024 20:15  Mg     1.8     10-09    TPro  7.6  /  Alb  4.0  /  TBili  0.6  /  DBili  x   /  AST  64[H]  /  ALT  23  /  AlkPhos  292[H]  10-09              ASSESSMENT & PLAN:   64-year-old male with a past medical history of stage IV colon cancer with mets to the liver follows with oncologist Dr. Patel on CAPOX plus GENET regimen and Capecitabine (started recently), hypertension, hyperlipidemia, and diabetes presents to the ED for evaluation of low blood sugar associated with right-sided facial droop, and right-sided upper and lower extremity weakness that began around 4 PM today. Pt was recently admitted for fever of unknown origin, finished a course of zosyn and had a paracentesis before DC.       #Hypoglycemia   #Facial weakness and leg weakness resolved   #DM  - Hypoglycemic to 42 with EMS  - Pt received 2 pushes of D50 by EMS  - D10 drip started: DC if FS continue to be ok  - Diet started  - f/u Insulin and C peptide level; Cortisol level. TSH  - Home meds: Janumet, pioglitazone   - Fingersticks q1h: Fingersticks will be at meals and bedtime    #HTN  - Patient on home HCTZ 12.5, Nifedipine 60 mg ER  - On lasix 40 at home    #Colon cancer Stage IV with mets to liver, peritoneum and lungs   - follows with oncologist Dr. Patel on CAPOX plus GENET regimen and Capecitabine (started recently)  - Recent admission for fever of unknown origin, finished a course of zosyn and had a paracentesis before DC    Diet: Regular   DVT ppx: Lovenox  GI ppx: protonix  Dispo: Floors    Pending:   - Monitor FS  - 11 am CMP, Mg  - f/u TSH, cortisol, proinsulin, C-peptide Transfer Note    Transfer from: CCU    Transfer to: ( x ) Medicine    (  ) Telemetry    (  ) RCU                               (  ) Palliative    (  ) Stroke Unit    (  ) MICU    (  ) STEPDOWN      HOSPITAL COURSE:  HPI:  Patient is a 64-year-old male with a past medical history of stage IV colon cancer with mets to the liver follows with oncologist Dr. Patel on CAPOX plus GENET regimen and Capecitabine (started recently), hypertension, hyperlipidemia, and diabetes presents to the ED for evaluation of low blood sugar associated with right-sided facial droop, and right-sided upper and lower extremity weakness that began around 4 PM today. Pt was recently admitted for fever of unknown origin, finished a course of zosyn and had a paracentesis before DC.     Today patient reports he was sitting at home and developed weakness in the right upper and lower extremity, and his wife noticed that he was not able to talk and had a right-sided facial droop.  EMS was called and on the scene patient's fingerstick was 42. Patient's wife reports after patient was given glucose by EMS and his sugar started to improve his symptoms started to resolve.      On arrival to the ED, Patient reports now his symptoms have resolved and is speaking full sentences and moving the bilateral upper and lower extremities. Patient denies headache, dizziness, visual changes, recent head trauma, use of blood thinners, neck pain, back pain, chest pain, shortness of breath, abdominal pain, nausea, vomiting, diarrhea, constipation, urinary or bowel retention or incontinence, or weakness.    At triage,   · Temp at ED Arrival (C)	36.6 Degrees C  · SpO2 (%)	99 %  · Temp (C)	36.6 Degrees C  · Temp (F)	97.8 Degrees F  · Respiration Rate (breaths/min)	18 /min  · Heart Rate	97 /min  · BP Diastolic	78 mm Hg  · BP Systolic	131 mm Hg    Labs; no neutropenia, FS ranging from 50s to 130, K 2.7.     CT head neg     In the ED, he received 1 D50 push and was started on D10 drip & K repleted.     Admitted to ICU for closer monitoring and q1 FS checks.  (10 Oct 2024 03:31)    In the ICU: Pt's Mg repleted, finger sticks normalized on D50. Diet was started. D10 drip weaning as tolerated. Sending c-peptide, pro-insulin, random cortisol, TSH.     Vital Signs Last 24 Hrs  T(C): 36.4 (10 Oct 2024 05:24), Max: 36.9 (09 Oct 2024 23:47)  T(F): 97.6 (10 Oct 2024 05:24), Max: 98.4 (09 Oct 2024 23:47)  HR: 87 (10 Oct 2024 06:24) (87 - 99)  BP: 103/65 (10 Oct 2024 06:24) (103/65 - 135/69)  BP(mean): 79 (10 Oct 2024 06:24) (79 - 87)  RR: 17 (10 Oct 2024 06:24) (17 - 23)  SpO2: 95% (10 Oct 2024 06:24) (95% - 99%)    Parameters below as of 10 Oct 2024 06:24  Patient On (Oxygen Delivery Method): room air        I&O's Summary    09 Oct 2024 07:01  -  10 Oct 2024 07:00  --------------------------------------------------------  IN: 150 mL / OUT: 400 mL / NET: -250 mL        Physical Exam    PHYSICAL EXAM:  GEN: NAD, Resting comfortably  PULM: Clear to auscultation bilaterally, No wheezing, rales, rhonchi  CVS: Regular rate and rhythm, S1-S2, no murmurs, heaves, thrills  ABD: Soft, non-tender, non-distended, no guarding, BS +  EXT: b/l LE edema, extremities warm/dry   NEURO: A&Ox3, no focal deficits        LABS:                               10.2   7.88  )-----------( 394      ( 09 Oct 2024 20:15 )             30.7       10-09    139  |  94[L]  |  11  ----------------------------<  61[L]  2.7[LL]   |  29  |  0.7    Ca    9.5      09 Oct 2024 20:15  Mg     1.8     10-09    TPro  7.6  /  Alb  4.0  /  TBili  0.6  /  DBili  x   /  AST  64[H]  /  ALT  23  /  AlkPhos  292[H]  10-09              ASSESSMENT & PLAN:   64-year-old male with a past medical history of stage IV colon cancer with mets to the liver follows with oncologist Dr. Patel on CAPOX plus GENET regimen and Capecitabine (started recently), hypertension, hyperlipidemia, and diabetes presents to the ED for evaluation of low blood sugar associated with right-sided facial droop, and right-sided upper and lower extremity weakness that began around 4 PM today. Pt was recently admitted for fever of unknown origin, finished a course of zosyn and had a paracentesis before DC.       #Hypoglycemia   #Facial weakness and leg weakness resolved   #DM  - Hypoglycemic to 42 with EMS  - Pt received 2 pushes of D50 by EMS  - D10 drip started: DCed as fs wnl and pt tolerating diet  - Diet started  - f/u Insulin and C peptide level; Cortisol level. TSH  - Home meds: Janumet, pioglitazone   - Fingersticks q1h: Fingersticks will be changed to at meals and bedtime    #HTN  - Patient on home HCTZ 12.5, Nifedipine 60 mg ER  - On lasix 40 at home    #Colon cancer Stage IV with mets to liver, peritoneum and lungs   - follows with oncologist Dr. Patel on CAPOX plus GENET regimen and Capecitabine (started recently)  - Recent admission for fever of unknown origin, finished a course of zosyn and had a paracentesis before DC    Diet: Regular   DVT ppx: Lovenox  GI ppx: protonix  Dispo: Floors    Pending:   - Monitor FS  - 11 am CMP, Mg  - f/u TSH, cortisol, proinsulin, C-peptide Transfer Note    Transfer from: CCU    Transfer to: ( x ) Medicine    (  ) Telemetry    (  ) RCU                               (  ) Palliative    (  ) Stroke Unit    (  ) MICU    (  ) STEPDOWN      HOSPITAL COURSE:  HPI:  Patient is a 64-year-old male with a past medical history of stage IV colon cancer with mets to the liver follows with oncologist Dr. Patel on CAPOX plus GENET regimen and Capecitabine (started recently), hypertension, hyperlipidemia, and diabetes presents to the ED for evaluation of low blood sugar associated with right-sided facial droop, and right-sided upper and lower extremity weakness that began around 4 PM today. Pt was recently admitted for fever of unknown origin, finished a course of zosyn and had a paracentesis before DC.     Today patient reports he was sitting at home and developed weakness in the right upper and lower extremity, and his wife noticed that he was not able to talk and had a right-sided facial droop.  EMS was called and on the scene patient's fingerstick was 42. Patient's wife reports after patient was given glucose by EMS and his sugar started to improve his symptoms started to resolve.      On arrival to the ED, Patient reports now his symptoms have resolved and is speaking full sentences and moving the bilateral upper and lower extremities. Patient denies headache, dizziness, visual changes, recent head trauma, use of blood thinners, neck pain, back pain, chest pain, shortness of breath, abdominal pain, nausea, vomiting, diarrhea, constipation, urinary or bowel retention or incontinence, or weakness.    At triage,   · Temp at ED Arrival (C)	36.6 Degrees C  · SpO2 (%)	99 %  · Temp (C)	36.6 Degrees C  · Temp (F)	97.8 Degrees F  · Respiration Rate (breaths/min)	18 /min  · Heart Rate	97 /min  · BP Diastolic	78 mm Hg  · BP Systolic	131 mm Hg    Labs; no neutropenia, FS ranging from 50s to 130, K 2.7.     CT head neg     In the ED, he received 1 D50 push and was started on D10 drip & K repleted.     Admitted to ICU for closer monitoring and q1 FS checks.  (10 Oct 2024 03:31)    In the ICU: Pt's Mg repleted, finger sticks normalized on D50. Diet was started. D10 drip weaning as tolerated. Sending c-peptide, pro-insulin, random cortisol, TSH.     Vital Signs Last 24 Hrs  T(C): 36.4 (10 Oct 2024 05:24), Max: 36.9 (09 Oct 2024 23:47)  T(F): 97.6 (10 Oct 2024 05:24), Max: 98.4 (09 Oct 2024 23:47)  HR: 87 (10 Oct 2024 06:24) (87 - 99)  BP: 103/65 (10 Oct 2024 06:24) (103/65 - 135/69)  BP(mean): 79 (10 Oct 2024 06:24) (79 - 87)  RR: 17 (10 Oct 2024 06:24) (17 - 23)  SpO2: 95% (10 Oct 2024 06:24) (95% - 99%)    Parameters below as of 10 Oct 2024 06:24  Patient On (Oxygen Delivery Method): room air        I&O's Summary    09 Oct 2024 07:01  -  10 Oct 2024 07:00  --------------------------------------------------------  IN: 150 mL / OUT: 400 mL / NET: -250 mL        Physical Exam    PHYSICAL EXAM:  GEN: NAD, Resting comfortably  PULM: Clear to auscultation bilaterally, No wheezing, rales, rhonchi  CVS: Regular rate and rhythm, S1-S2, no murmurs, heaves, thrills  ABD: Soft, non-tender, non-distended, no guarding, BS +  EXT: b/l LE edema, extremities warm/dry   NEURO: A&Ox3, no focal deficits        LABS:                               10.2   7.88  )-----------( 394      ( 09 Oct 2024 20:15 )             30.7       10-09    139  |  94[L]  |  11  ----------------------------<  61[L]  2.7[LL]   |  29  |  0.7    Ca    9.5      09 Oct 2024 20:15  Mg     1.8     10-09    TPro  7.6  /  Alb  4.0  /  TBili  0.6  /  DBili  x   /  AST  64[H]  /  ALT  23  /  AlkPhos  292[H]  10-09              ASSESSMENT & PLAN:   64-year-old male with a past medical history of stage IV colon cancer with mets to the liver follows with oncologist Dr. Patel on CAPOX plus GENET regimen and Capecitabine (started recently), hypertension, hyperlipidemia, and diabetes presents to the ED for evaluation of low blood sugar associated with right-sided facial droop, and right-sided upper and lower extremity weakness that began around 4 PM today. Pt was recently admitted for fever of unknown origin, finished a course of zosyn and had a paracentesis before DC.       #Hypoglycemia   #Facial weakness and leg weakness resolved   #DM  - Hypoglycemic to 42 with EMS  - Pt received 2 pushes of D50 by EMS  - D10 drip started: DCed as fs wnl and pt tolerating diet  - Diet started  - f/u Insulin and C peptide level; Cortisol level. TSH  - Home meds: Janumet, pioglitazone   - Fingersticks q1h: Fingersticks will be changed to at meals and bedtime    #HTN  - Patient on home HCTZ 12.5, Nifedipine 60 mg ER  - On lasix 40 at home    #Colon cancer Stage IV with mets to liver, peritoneum and lungs   - follows with oncologist Dr. Patel on CAPOX plus GENET regimen and Capecitabine (started recently)  - Recent admission for fever of unknown origin, finished a course of zosyn and had a paracentesis before DC    Diet: Regular   DVT ppx: Lovenox  GI ppx: protonix  Dispo: Floors    Pending:   - Monitor FS  - Monitor K, Mg  - f/u TSH, cortisol, proinsulin, C-peptide

## 2024-10-10 NOTE — H&P ADULT - HISTORY OF PRESENT ILLNESS
Patient is a 64-year-old male with a past medical history of stage IV colon cancer with mets to the liver followed by oncologist Dr. Patel on CAPOX plus GENET regimen and Capecitabine, hypertension, hyperlipidemia, and diabetes on metformin-glipizide presents to the ED for evaluation of low blood sugar associated with right-sided facial droop, and right-sided upper and lower extremity weakness that began around 4 PM. Pt was recently admitted for fever of unknown origin, finished a course of zosyn and had a paracentesis before DC.     Today patient reports he was sitting at home and developed weakness in the right upper and lower extremity, and his wife noticed that he was not able to talk and had a right-sided facial droop.  EMS was called and on the scene patient's fingerstick was 42.  As per patient's wife patient was given "2 glucose" by EMS and his fingerstick went up to 46 so that she gave him some Sprite.  Patient's wife reports after patient was given glucose and his sugar started to improve his symptoms started to resolve.      On arrival to the ED, Patient reports now his symptoms have resolved and is speaking full sentences and moving the bilateral upper and lower extremities. Patient denies headache, dizziness, visual changes, recent head trauma, use of blood thinners, neck pain, back pain, chest pain, shortness of breath, abdominal pain, nausea, vomiting, diarrhea, constipation, urinary or bowel retention or incontinence, or weakness.    At triage,   · Temp at ED Arrival (C)	36.6 Degrees C  · SpO2 (%)	99 %  · Temp (C)	36.6 Degrees C  · Temp (F)	97.8 Degrees F  · Respiration Rate (breaths/min)	18 /min  · Heart Rate	97 /min  · BP Diastolic	78 mm Hg  · BP Systolic	131 mm Hg    Labs; no neutropenia, FS ranging from 50s to 130, K 2.7.     CT head neg     In the ED, he received 1 D50 push and was started on D10 drip.     Admitted to ICU for closer monitoring and q1 FS checks.  Patient is a 64-year-old male with a past medical history of stage IV colon cancer with mets to the liver follows with oncologist Dr. Patel on CAPOX plus GENET regimen and Capecitabine (started recently), hypertension, hyperlipidemia, and diabetes presents to the ED for evaluation of low blood sugar associated with right-sided facial droop, and right-sided upper and lower extremity weakness that began around 4 PM today. Pt was recently admitted for fever of unknown origin, finished a course of zosyn and had a paracentesis before DC.     Today patient reports he was sitting at home and developed weakness in the right upper and lower extremity, and his wife noticed that he was not able to talk and had a right-sided facial droop.  EMS was called and on the scene patient's fingerstick was 42. Patient's wife reports after patient was given glucose by EMS and his sugar started to improve his symptoms started to resolve.      On arrival to the ED, Patient reports now his symptoms have resolved and is speaking full sentences and moving the bilateral upper and lower extremities. Patient denies headache, dizziness, visual changes, recent head trauma, use of blood thinners, neck pain, back pain, chest pain, shortness of breath, abdominal pain, nausea, vomiting, diarrhea, constipation, urinary or bowel retention or incontinence, or weakness.    At triage,   · Temp at ED Arrival (C)	36.6 Degrees C  · SpO2 (%)	99 %  · Temp (C)	36.6 Degrees C  · Temp (F)	97.8 Degrees F  · Respiration Rate (breaths/min)	18 /min  · Heart Rate	97 /min  · BP Diastolic	78 mm Hg  · BP Systolic	131 mm Hg    Labs; no neutropenia, FS ranging from 50s to 130, K 2.7.     CT head neg     In the ED, he received 1 D50 push and was started on D10 drip & K repleted.     Admitted to ICU for closer monitoring and q1 FS checks.

## 2024-10-10 NOTE — ED ADULT NURSE NOTE - NSFALLRISKINTERV_ED_ALL_ED

## 2024-10-10 NOTE — H&P ADULT - NSHPLABSRESULTS_GEN_ALL_CORE
10.2   7.88  )-----------( 394      ( 09 Oct 2024 20:15 )             30.7     10-09    139  |  94[L]  |  11  ----------------------------<  61[L]  2.7[LL]   |  29  |  0.7    Ca    9.5      09 Oct 2024 20:15  Mg     1.8     10-09    TPro  7.6  /  Alb  4.0  /  TBili  0.6  /  DBili  x   /  AST  64[H]  /  ALT  23  /  AlkPhos  292[H]  10-09      Urinalysis Basic - ( 09 Oct 2024 20:15 )    Color: x / Appearance: x / SG: x / pH: x  Gluc: 61 mg/dL / Ketone: x  / Bili: x / Urobili: x   Blood: x / Protein: x / Nitrite: x   Leuk Esterase: x / RBC: x / WBC x   Sq Epi: x / Non Sq Epi: x / Bacteria: x        Culture - Fungal, Body Fluid (collected 10-04-24 @ 12:48)  Source: Peritoneal  Preliminary Report (10-06-24 @ 14:27):    No growth

## 2024-10-10 NOTE — PATIENT PROFILE ADULT - FALL HARM RISK - HARM RISK INTERVENTIONS

## 2024-10-10 NOTE — PATIENT PROFILE ADULT - DO YOU FEEL LIKE HURTING YOURSELF OR OTHERS?
Metabolic Encephalopathy  N/V and Fever R/O Infection unsure of source ?UTI  -Admit to medical floors  -CT Head NAD   -CT abd and pelvis no active infection  -Gentle IVF  -ID consult Patient has a right IJ  -FU UA and blood cultures  -Hold off ABX till we know the source of the infection    RADHA on CKD  -Nephrology consulted  -I/Os  -Avoid nephrotoxic agents and/or adjust dose accordingly  -Continue to monitor Cr/Electrolytes     CAD s/p CABG (10/2022) without angina  Paroxysmal Atrial Fibrillation (on Amiodarone; not AC)  HTN  HLD  -[id number]=1. Not on AC since 7/2022.   -ASA/Plavix   -Continue BB/Statin  -Continue Amiodarone    Normocytic Anemia  -Likely component of CKD  -Stable and at baseline  -No overt signs of bleeding.     DVT Prophylaxis: heparin subq    Advanced Care Planning 35 minutes.  Discussion with patient regarding advance directives. We discussed diagnosis, prognosis and goals of care. At this time they would like to be FULL Code  Primary is daughter, Zaida Sandhu ([phone]) and secondary is son, Isidro Nova ([phone])       Metabolic Encephalopathy  N/V and Fever R/O Infection unsure of source ?UTI  -Admit to medical floors  -CT Head NAD   -CT abd and pelvis no active infection ? UTI  -Gentle IVF  -ID consult Patient has a right IJ  -FU UA and blood cultures  -Hold off ABX till we know the source of the infection    RADHA on CKD  -Nephrology consulted  -I/Os  -Avoid nephrotoxic agents and/or adjust dose accordingly  -Continue to monitor Cr/Electrolytes     CAD s/p CABG (10/2022)   Paroxysmal Atrial Fibrillation (on Amiodarone; not AC)  HTN  HLD  -[id number]=0. Not on AC since 7/2022.   -ASA/Plavix   -Continue BB/Statin  -Continue Amiodarone    Normocytic Anemia  -Likely component of CKD  -Stable and at baseline  -No overt signs of bleeding.     DVT Prophylaxis: heparin subq    Advanced Care Planning 35 minutes.  Discussion with patient regarding advance directives. We discussed diagnosis, prognosis and goals of care. At this time they would like to be FULL Code  Primary is daughter, Zaida Sandhu ([phone]) and secondary is son, Isidro Nova ([phone])       no

## 2024-10-10 NOTE — H&P ADULT - NSHPPHYSICALEXAM_GEN_ALL_CORE
GENERAL: NAD, lying in bed comfortably  HEART: Regular rate and rhythm, no murmurs, rubs, or gallops  LUNGS: Unlabored respirations.  Clear to auscultation bilaterally, no crackles, wheezing, or rhonchi  ABDOMEN: Soft, nontender, distended, +BS  EXTREMITIES: 2+ peripheral pulses bilaterally. 1+ peripheral edema  NERVOUS SYSTEM:  A&Ox3, moving all extremities, no focal deficits

## 2024-10-10 NOTE — ED ADULT NURSE NOTE - OBJECTIVE STATEMENT
64y male BIBEMS c/o hypoglycemia, 42 fs in field EMS gave d50. pt alert and awake speaking in full sentences.

## 2024-10-10 NOTE — PROGRESS NOTE ADULT - ASSESSMENT
64-year-old male with a past medical history of stage IV colon cancer with mets to the liver follows with oncologist Dr. Patel on CAPOX plus GENET regimen and Capecitabine (started recently), hypertension, hyperlipidemia, and diabetes presents to the ED for evaluation of low blood sugar associated with right-sided facial droop, and right-sided upper and lower extremity weakness that began around 4 PM today. Pt was recently admitted for fever of unknown origin, finished a course of zosyn and had a paracentesis before DC.       #Hypoglycemia   #Facial weakness and leg weakness resolved   #DM  - Hypoglycemic to 42 with EMS  - Pt received 2 pushes of D50 by EMS  - D10 drip started: DC if FS continue to be ok  - Diet started  - f/u Insulin and C peptide level; Cortisol level. TSH  - Home meds: Janumet, pioglitazone   - Fingersticks q1h: Fingersticks will be at meals and bedtime    #HTN  - Patient on home HCTZ 12.5, Nifedipine 60 mg ER    #Colon cancer Stage IV with mets to liver, peritoneum and lungs   - follows with oncologist Dr. Patel on CAPOX plus GENET regimen and Capecitabine (started recently)  - Recent admission for fever of unknown origin, finished a course of zosyn and had a paracentesis before DC    Diet: Regular   DVT ppx: Lovenox  GI ppx: protonix  Dispo: Floors    Pending:   - Monitor FS  - 11 am CMP, Mg  - f/u TSH, cortisol, proinsulin, C-peptide.   64-year-old male with a past medical history of stage IV colon cancer with mets to the liver follows with oncologist Dr. Patel on CAPOX plus GENET regimen and Capecitabine (started recently), hypertension, hyperlipidemia, and diabetes presents to the ED for evaluation of low blood sugar associated with right-sided facial droop, and right-sided upper and lower extremity weakness that began around 4 PM today. Pt was recently admitted for fever of unknown origin, finished a course of zosyn and had a paracentesis before DC.       #Hypoglycemia   #Facial weakness and leg weakness resolved   #DM  - Hypoglycemic to 42 with EMS  - Pt received 2 pushes of D50 by EMS  - D10 drip started: DC if FS continue to be ok  - Diet started  - f/u Insulin and C peptide level; Cortisol level. TSH  - Home meds: Janumet, pioglitazone   - Fingersticks q1h: Fingersticks will be at meals and bedtime    #HTN  - Patient on home HCTZ 12.5, Nifedipine 60 mg ER  - On lasix 40 at home    #Colon cancer Stage IV with mets to liver, peritoneum and lungs   - follows with oncologist Dr. Patel on CAPOX plus GENET regimen and Capecitabine (started recently)  - Recent admission for fever of unknown origin, finished a course of zosyn and had a paracentesis before DC    Diet: Regular   DVT ppx: Lovenox  GI ppx: protonix  Dispo: Floors    Pending:   - Monitor FS  - 11 am CMP, Mg  - f/u TSH, cortisol, proinsulin, C-peptide.

## 2024-10-11 ENCOUNTER — TRANSCRIPTION ENCOUNTER (OUTPATIENT)
Age: 64
End: 2024-10-11

## 2024-10-11 VITALS
SYSTOLIC BLOOD PRESSURE: 104 MMHG | TEMPERATURE: 98 F | RESPIRATION RATE: 19 BRPM | DIASTOLIC BLOOD PRESSURE: 68 MMHG | HEART RATE: 97 BPM | OXYGEN SATURATION: 97 %

## 2024-10-11 LAB
ALBUMIN SERPL ELPH-MCNC: 3.2 G/DL — LOW (ref 3.5–5.2)
ALP SERPL-CCNC: 234 U/L — HIGH (ref 30–115)
ALT FLD-CCNC: 18 U/L — SIGNIFICANT CHANGE UP (ref 0–41)
ANION GAP SERPL CALC-SCNC: 10 MMOL/L — SIGNIFICANT CHANGE UP (ref 7–14)
AST SERPL-CCNC: 45 U/L — HIGH (ref 0–41)
BASOPHILS # BLD AUTO: 0 K/UL — SIGNIFICANT CHANGE UP (ref 0–0.2)
BASOPHILS NFR BLD AUTO: 0 % — SIGNIFICANT CHANGE UP (ref 0–1)
BILIRUB SERPL-MCNC: 0.4 MG/DL — SIGNIFICANT CHANGE UP (ref 0.2–1.2)
BUN SERPL-MCNC: 10 MG/DL — SIGNIFICANT CHANGE UP (ref 10–20)
C PEPTIDE SERPL-MCNC: 3.1 NG/ML — SIGNIFICANT CHANGE UP (ref 1.1–4.4)
C PEPTIDE SERPL-MCNC: 3.5 NG/ML — SIGNIFICANT CHANGE UP (ref 1.1–4.4)
CALCIUM SERPL-MCNC: 8.4 MG/DL — SIGNIFICANT CHANGE UP (ref 8.4–10.5)
CHLORIDE SERPL-SCNC: 97 MMOL/L — LOW (ref 98–110)
CO2 SERPL-SCNC: 31 MMOL/L — SIGNIFICANT CHANGE UP (ref 17–32)
CORTIS SERPL-MCNC: 12.5 UG/DL — SIGNIFICANT CHANGE UP
CORTIS SERPL-MCNC: 15.3 UG/DL — SIGNIFICANT CHANGE UP
CREAT SERPL-MCNC: 0.8 MG/DL — SIGNIFICANT CHANGE UP (ref 0.7–1.5)
EGFR: 98 ML/MIN/1.73M2 — SIGNIFICANT CHANGE UP
EOSINOPHIL # BLD AUTO: 0 K/UL — SIGNIFICANT CHANGE UP (ref 0–0.7)
EOSINOPHIL NFR BLD AUTO: 0 % — SIGNIFICANT CHANGE UP (ref 0–8)
GLUCOSE BLDC GLUCOMTR-MCNC: 115 MG/DL — HIGH (ref 70–99)
GLUCOSE BLDC GLUCOMTR-MCNC: 142 MG/DL — HIGH (ref 70–99)
GLUCOSE SERPL-MCNC: 104 MG/DL — HIGH (ref 70–99)
HCT VFR BLD CALC: 28.1 % — LOW (ref 42–52)
HGB BLD-MCNC: 9.5 G/DL — LOW (ref 14–18)
LYMPHOCYTES # BLD AUTO: 0.65 K/UL — LOW (ref 1.2–3.4)
LYMPHOCYTES # BLD AUTO: 9.8 % — LOW (ref 20.5–51.1)
MAGNESIUM SERPL-MCNC: 1.9 MG/DL — SIGNIFICANT CHANGE UP (ref 1.8–2.4)
MCHC RBC-ENTMCNC: 28.4 PG — SIGNIFICANT CHANGE UP (ref 27–31)
MCHC RBC-ENTMCNC: 33.8 G/DL — SIGNIFICANT CHANGE UP (ref 32–37)
MCV RBC AUTO: 84.1 FL — SIGNIFICANT CHANGE UP (ref 80–94)
MONOCYTES # BLD AUTO: 0.82 K/UL — HIGH (ref 0.1–0.6)
MONOCYTES NFR BLD AUTO: 12.5 % — HIGH (ref 1.7–9.3)
NEUTROPHILS # BLD AUTO: 5.12 K/UL — SIGNIFICANT CHANGE UP (ref 1.4–6.5)
NEUTROPHILS NFR BLD AUTO: 77.7 % — HIGH (ref 42.2–75.2)
PLATELET # BLD AUTO: 397 K/UL — SIGNIFICANT CHANGE UP (ref 130–400)
PMV BLD: 10.9 FL — HIGH (ref 7.4–10.4)
POTASSIUM SERPL-MCNC: 3.7 MMOL/L — SIGNIFICANT CHANGE UP (ref 3.5–5)
POTASSIUM SERPL-SCNC: 3.7 MMOL/L — SIGNIFICANT CHANGE UP (ref 3.5–5)
PROT SERPL-MCNC: 6.2 G/DL — SIGNIFICANT CHANGE UP (ref 6–8)
RBC # BLD: 3.34 M/UL — LOW (ref 4.7–6.1)
RBC # FLD: 20.2 % — HIGH (ref 11.5–14.5)
SODIUM SERPL-SCNC: 138 MMOL/L — SIGNIFICANT CHANGE UP (ref 135–146)
WBC # BLD: 6.59 K/UL — SIGNIFICANT CHANGE UP (ref 4.8–10.8)
WBC # FLD AUTO: 6.59 K/UL — SIGNIFICANT CHANGE UP (ref 4.8–10.8)

## 2024-10-11 PROCEDURE — 99239 HOSP IP/OBS DSCHRG MGMT >30: CPT

## 2024-10-11 RX ORDER — POTASSIUM CITRATE MONOHYDRATE 100 %
1 POWDER (GRAM) MISCELLANEOUS
Qty: 30 | Refills: 0
Start: 2024-10-11 | End: 2024-11-09

## 2024-10-11 RX ORDER — METFORMIN HCL 500 MG
1 TABLET ORAL
Qty: 60 | Refills: 0
Start: 2024-10-11 | End: 2024-11-09

## 2024-10-11 RX ORDER — POTASSIUM BICARBONATE 100 %
1 POWDER (GRAM) MISCELLANEOUS
Qty: 30 | Refills: 0
Start: 2024-10-11 | End: 2024-11-09

## 2024-10-11 RX ORDER — CHLORHEXIDINE GLUCONATE ORAL RINSE 1.2 MG/ML
1 SOLUTION DENTAL
Refills: 0 | Status: DISCONTINUED | OUTPATIENT
Start: 2024-10-11 | End: 2024-10-11

## 2024-10-11 RX ADMIN — ENOXAPARIN SODIUM 40 MILLIGRAM(S): 150 INJECTION SUBCUTANEOUS at 08:42

## 2024-10-11 RX ADMIN — FUROSEMIDE 40 MILLIGRAM(S): 10 INJECTION INTRAVENOUS at 06:34

## 2024-10-11 RX ADMIN — DRONABINOL 5 MILLIGRAM(S): 5 CAPSULE ORAL at 11:06

## 2024-10-11 RX ADMIN — PANTOPRAZOLE SODIUM 40 MILLIGRAM(S): 40 TABLET, DELAYED RELEASE ORAL at 06:34

## 2024-10-11 RX ADMIN — Medication 90 MILLIGRAM(S): at 06:34

## 2024-10-11 RX ADMIN — Medication 81 MILLIGRAM(S): at 11:06

## 2024-10-11 NOTE — DISCHARGE NOTE PROVIDER - NSDCCPCAREPLAN_GEN_ALL_CORE_FT
PRINCIPAL DISCHARGE DIAGNOSIS  Diagnosis: Hypoglycemia  Assessment and Plan of Treatment: You were recently seen and treated at the hospital for hypoglyemia. Please continue to check your blood sugar regularly and make sure you are eating throughout the day. Continue to take your medications as directed.   If you are experiencing severe symptoms, please call your doctor and return to the Emergency Department.      SECONDARY DISCHARGE DIAGNOSES  Diagnosis: Hypokalemia  Assessment and Plan of Treatment: Eat 3-4 servings of fruits and vegetables every day. Take your medications as prescribed.     PRINCIPAL DISCHARGE DIAGNOSIS  Diagnosis: Hypoglycemia  Assessment and Plan of Treatment: Hypoglycemia  Hypoglycemia occurs when the glucose (sugar) level in your blood is too low. Symptoms include confusion, weakness, or fainting. You may even appear to be having a stroke. Take medications exactly as prescribed by your health care professional. Maintain a healthy lifestyle and follow up with your primary care physician.  SEEK IMMEDIATE MEDICAL CARE IF YOU HAVE ANY OF THE FOLLOWING SYMPTOMS: weakness, fainting, change in mental status, nausea or vomiting, fruity smell to your breath, or any signs of dehydration.  You were recently seen and treated at the hospital for hypoglyemia. Please continue to check your blood sugar regularly and make sure you are eating throughout the day. Continue to take your medications as directed.   If you are experiencing severe symptoms, please call your doctor and return to the Emergency Department.      SECONDARY DISCHARGE DIAGNOSES  Diagnosis: Hypokalemia  Assessment and Plan of Treatment: You were found to be hypokalemic on presentation, likely secondary to poor oral intake as well as medication side effect. You were repleted while in the hospital with normalizatio of your values. Please eat 3-4 servings of fruits and vegetables every day. Please take your medications as prescribed.     PRINCIPAL DISCHARGE DIAGNOSIS  Diagnosis: Hypoglycemia  Assessment and Plan of Treatment: MEDICATION INDUCED HYPOGLYCEMIA  CHEMOMOTHERAPY INDUCED SATIETY AND LACK OF APPETITE  STAGE 4 COLON CANCER   CARE AS DISCUSSED WITH DAUGHTER   FOLLOW WITH ONC/ MONITOR POTASSIUM AND BLOOD SUGAR  TAKE ENSURE 3 TIMES A DAY  STOP JANUMET/METFORMIN COMBINATION   ONLY TAKE NEW PRESCRIPTION OF METFORMIN WHEN YOU ARE EATING   Hypoglycemia  Hypoglycemia occurs when the glucose (sugar) level in your blood is too low. Symptoms include confusion, weakness, or fainting. You may even appear to be having a stroke. Take medications exactly as prescribed by your health care professional. Maintain a healthy lifestyle and follow up with your primary care physician.  SEEK IMMEDIATE MEDICAL CARE IF YOU HAVE ANY OF THE FOLLOWING SYMPTOMS: weakness, fainting, change in mental status, nausea or vomiting, fruity smell to your breath, or any signs of dehydration.  You were recently seen and treated at the hospital for hypoglyemia. Please continue to check your blood sugar regularly and make sure you are eating throughout the day. Continue to take your medications as directed.   If you are experiencing severe symptoms, please call your doctor and return to the Emergency Department.      SECONDARY DISCHARGE DIAGNOSES  Diagnosis: Hypokalemia  Assessment and Plan of Treatment: PLEASE REMEMBER YOUR BLOOD POTASSIUM LEVEL MUST BE MONITORED WHILE ON POTASSIUM SUPPLEMENTATION PILL. GOAL POTASSIUM LEVEL 4.0 IF ABOVE THIS THIS LEVEL CONSIDER STOPPING POTASSIUM OR TAKING IT EVERY OTHER DAY. STOP TAKING POTASSIUM IF LEVELS ARE ABOVE 5.0 GOAL IS 4.0  HYPOMAGNESEMIA - CORRECTED   You were found to be hypokalemic on presentation, likely secondary to poor oral intake as well as medication side effect. You were repleted while in the hospital with normalizatio of your values. Please eat 3-4 servings of fruits and vegetables every day. Please take your medications as prescribed.

## 2024-10-11 NOTE — DISCHARGE NOTE PROVIDER - ATTENDING DISCHARGE PHYSICAL EXAMINATION:
Vital Signs Last 24 Hrs  T(C): 36.7 (11 Oct 2024 04:51), Max: 36.7 (11 Oct 2024 04:51)  T(F): 98.1 (11 Oct 2024 04:51), Max: 98.1 (11 Oct 2024 04:51)  HR: 97 (11 Oct 2024 04:51) (80 - 97)  BP: 104/68 (11 Oct 2024 04:51) (92/59 - 117/68)  BP(mean): 97 (11 Oct 2024 04:51) (70 - 97)  RR: 19 (11 Oct 2024 04:51) (15 - 19)  SpO2: 97% (11 Oct 2024 04:51) (97% - 100%)    Parameters below as of 11 Oct 2024 04:51  Patient On (Oxygen Delivery Method): room air    GEN: NAD / VERY PLEASANT MALE  CV: NL S1/2   RESP: CTA RIGHT LUNG AND DECREASED BS LEFT LUNG FIELD  GI: +BS SOFT NT ND  EXT: NO EDEMA  MS: AOX3     LABS REVIEWED

## 2024-10-11 NOTE — DISCHARGE NOTE PROVIDER - CARE PROVIDER_API CALL
Kody Zhong  Internal Medicine  [address] [place] [address], Admin - Room [address] [place] [address]  Phone: [phone]  Fax: [phone]  Follow Up Time: 2 weeks

## 2024-10-11 NOTE — DISCHARGE NOTE PROVIDER - NSDCFUSCHEDAPPT_GEN_ALL_CORE_FT
Oleksandr Patel  [facility]  Scheduled Appointment: 10/15/2024    Oleksandr Patel  [facility] Physician [facility]  HEMONC  [address]  Scheduled Appointment: 10/15/2024    [facility] Physician [facility]  AMBC[facility]MO  [address]  Scheduled Appointment: 10/16/2024    Oleksandr Patel  [facility]  Scheduled Appointment: 10/16/2024

## 2024-10-11 NOTE — DISCHARGE NOTE NURSING/CASE MANAGEMENT/SOCIAL WORK - PATIENT PORTAL LINK FT
You can access the FollowMyHealth Patient Portal offered by [facility] by registering at the following website: [email or web address]. By joining [facility]’s FollowMyHealth portal, you will also be able to view your health information using other applications (apps) compatible with our system.

## 2024-10-11 NOTE — DISCHARGE NOTE PROVIDER - NSDCMRMEDTOKEN_GEN_ALL_CORE_FT
aspirin 81 mg oral tablet: orally once a day  benzonatate 100 mg oral capsule: 1 cap(s) orally every 8 hours as needed for  cough take as needed, can take once every 8 hours as needed  droNABinol 5 mg oral capsule: 1 cap(s) orally once a day MDD: 1 daily  furosemide 40 mg oral tablet: 1 tab(s) orally once a day can stop taking med if feel dehydrated  hydroCHLOROthiazide 12.5 mg oral tablet: 1 tab(s) orally once a day  metFORMIN 500 mg oral tablet: 1 tab(s) orally 2 times a day  naproxen 500 mg oral tablet: 1 tab(s) orally as needed for  severe pain  NIFEdipine 90 mg oral tablet, extended release: 1 tab(s) orally once a day  potassium bicarbonate 10 mEq oral tablet, effervescent: 1 tab(s) orally once a day  simvastatin 40 mg oral tablet: 1 tab(s) orally once a day (at bedtime)  Xeloda 500 mg oral tablet: 4 tab(s) orally once a week PLEASE FOLLOW UP WITH YOUR ONCOLOGIST ABOUT THIS MEDICATION. LAST DOSE 10/02/2024   aspirin 81 mg oral tablet: orally once a day  benzonatate 100 mg oral capsule: 1 cap(s) orally every 8 hours as needed for  cough take as needed, can take once every 8 hours as needed  droNABinol 5 mg oral capsule: 1 cap(s) orally once a day MDD: 1 daily  furosemide 40 mg oral tablet: 1 tab(s) orally once a day can stop taking med if feel dehydrated  hydroCHLOROthiazide 12.5 mg oral tablet: 1 tab(s) orally once a day  metFORMIN 500 mg oral tablet: 1 tab(s) orally 2 times a day  naproxen 500 mg oral tablet: 1 tab(s) orally as needed for  severe pain  NIFEdipine 90 mg oral tablet, extended release: 1 tab(s) orally once a day  potassium citrate 10 mEq oral tablet, extended release: 1 tab(s) orally once a day  simvastatin 40 mg oral tablet: 1 tab(s) orally once a day (at bedtime)  Xeloda 500 mg oral tablet: 4 tab(s) orally once a week PLEASE FOLLOW UP WITH YOUR ONCOLOGIST ABOUT THIS MEDICATION. LAST DOSE 10/02/2024

## 2024-10-14 LAB
PROINSULIN SERPL-MCNC: 10.3 PMOL/L — HIGH (ref 0–10)
PROINSULIN SERPL-MCNC: 11 PMOL/L — HIGH (ref 0–10)

## 2024-10-15 ENCOUNTER — APPOINTMENT (OUTPATIENT)
Age: 64
End: 2024-10-15
Payer: MEDICARE

## 2024-10-15 ENCOUNTER — LABORATORY RESULT (OUTPATIENT)
Age: 64
End: 2024-10-15

## 2024-10-15 ENCOUNTER — OUTPATIENT (OUTPATIENT)
Dept: OUTPATIENT SERVICES | Facility: HOSPITAL | Age: 64
LOS: 1 days | End: 2024-10-15
Payer: MEDICARE

## 2024-10-15 VITALS
TEMPERATURE: 98.2 F | OXYGEN SATURATION: 98 % | HEIGHT: 72 IN | WEIGHT: 242.56 LBS | BODY MASS INDEX: 32.85 KG/M2 | DIASTOLIC BLOOD PRESSURE: 75 MMHG | HEART RATE: 111 BPM | SYSTOLIC BLOOD PRESSURE: 109 MMHG

## 2024-10-15 DIAGNOSIS — C18.9 MALIGNANT NEOPLASM OF COLON, UNSPECIFIED: ICD-10-CM

## 2024-10-15 LAB
ALBUMIN SERPL ELPH-MCNC: 3.6 G/DL
ALP BLD-CCNC: 257 U/L
ALT SERPL-CCNC: 20 U/L
ANION GAP SERPL CALC-SCNC: 11 MMOL/L
APPEARANCE: CLEAR
AST SERPL-CCNC: 50 U/L
BILIRUB SERPL-MCNC: 0.5 MG/DL
BILIRUBIN URINE: NEGATIVE
BLOOD URINE: NEGATIVE
BUN SERPL-MCNC: 13 MG/DL
CALCIUM SERPL-MCNC: 9.3 MG/DL
CHLORIDE SERPL-SCNC: 97 MMOL/L
CO2 SERPL-SCNC: 32 MMOL/L
COLOR: NORMAL
CREAT SERPL-MCNC: 0.8 MG/DL
EGFR: 98 ML/MIN/1.73M2
GLUCOSE QUALITATIVE U: NEGATIVE MG/DL
GLUCOSE SERPL-MCNC: 104 MG/DL
HCT VFR BLD CALC: 29.5 %
HGB BLD-MCNC: 10 G/DL
KETONES URINE: ABNORMAL MG/DL
LEUKOCYTE ESTERASE URINE: ABNORMAL
MAGNESIUM SERPL-MCNC: 1.9 MG/DL
MCHC RBC-ENTMCNC: 27.9 PG
MCHC RBC-ENTMCNC: 33.9 G/DL
MCV RBC AUTO: 82.4 FL
NITRITE URINE: NEGATIVE
PH URINE: 6
PLATELET # BLD AUTO: 391 K/UL
PMV BLD: 9.6 FL
POTASSIUM SERPL-SCNC: 3.1 MMOL/L
PROT SERPL-MCNC: 7.1 G/DL
PROTEIN URINE: NORMAL MG/DL
RBC # BLD: 3.58 M/UL
RBC # FLD: 21.2 %
SODIUM SERPL-SCNC: 140 MMOL/L
SPECIFIC GRAVITY URINE: 1.01
UROBILINOGEN URINE: 1 MG/DL
WBC # FLD AUTO: 6.64 K/UL

## 2024-10-15 PROCEDURE — 83735 ASSAY OF MAGNESIUM: CPT

## 2024-10-15 PROCEDURE — 80053 COMPREHEN METABOLIC PANEL: CPT

## 2024-10-15 PROCEDURE — 85027 COMPLETE CBC AUTOMATED: CPT

## 2024-10-15 PROCEDURE — 99214 OFFICE O/P EST MOD 30 MIN: CPT

## 2024-10-15 PROCEDURE — 82378 CARCINOEMBRYONIC ANTIGEN: CPT

## 2024-10-15 PROCEDURE — 81001 URINALYSIS AUTO W/SCOPE: CPT

## 2024-10-15 RX ORDER — DEXAMETHASONE 4 MG/1
4 TABLET ORAL
Qty: 15 | Refills: 0 | Status: ACTIVE | COMMUNITY
Start: 2024-10-15 | End: 1900-01-01

## 2024-10-15 RX ORDER — POTASSIUM CHLORIDE 1500 MG/1
20 TABLET, FILM COATED, EXTENDED RELEASE ORAL DAILY
Qty: 30 | Refills: 1 | Status: ACTIVE | COMMUNITY
Start: 2024-10-15 | End: 1900-01-01

## 2024-10-16 ENCOUNTER — APPOINTMENT (OUTPATIENT)
Age: 64
End: 2024-10-16

## 2024-10-16 ENCOUNTER — OUTPATIENT (OUTPATIENT)
Dept: OUTPATIENT SERVICES | Facility: HOSPITAL | Age: 64
LOS: 1 days | End: 2024-10-16
Payer: MEDICARE

## 2024-10-16 VITALS — TEMPERATURE: 98 F | DIASTOLIC BLOOD PRESSURE: 75 MMHG | SYSTOLIC BLOOD PRESSURE: 116 MMHG | HEART RATE: 91 BPM

## 2024-10-16 DIAGNOSIS — C18.9 MALIGNANT NEOPLASM OF COLON, UNSPECIFIED: ICD-10-CM

## 2024-10-16 LAB
CEA SERPL-MCNC: 488 NG/ML
CHLORPROPAMIDE RESULT: SIGNIFICANT CHANGE UP MCG/ML
GLIMEPIRIDE RESULT: SIGNIFICANT CHANGE UP NG/ML
GLIPIZIDE RESULT: 100 NG/ML — SIGNIFICANT CHANGE UP
GLYBURIDE RESULT: SIGNIFICANT CHANGE UP NG/ML
NATEGLINIDE RESULT: SIGNIFICANT CHANGE UP MCG/ML
PIOGLITAZONE RESULT: SIGNIFICANT CHANGE UP NG/ML
REPAGLINIDE RESULT: SIGNIFICANT CHANGE UP NG/ML
ROSIGLITAZONE RESULT: SIGNIFICANT CHANGE UP NG/ML
TOLAZAMIDE RESULT: SIGNIFICANT CHANGE UP MCG/ML
TOLBUTAMIDE RESULT: SIGNIFICANT CHANGE UP MCG/ML

## 2024-10-16 PROCEDURE — 96415 CHEMO IV INFUSION ADDL HR: CPT

## 2024-10-16 PROCEDURE — 96375 TX/PRO/DX INJ NEW DRUG ADDON: CPT

## 2024-10-16 PROCEDURE — 96413 CHEMO IV INFUSION 1 HR: CPT

## 2024-10-16 PROCEDURE — 96417 CHEMO IV INFUS EACH ADDL SEQ: CPT

## 2024-10-16 RX ORDER — OXALIPLATIN 5 MG/ML
250 INJECTION, SOLUTION INTRAVENOUS ONCE
Refills: 0 | Status: COMPLETED | OUTPATIENT
Start: 2024-10-16 | End: 2024-10-16

## 2024-10-16 RX ORDER — DEXAMETHASONE SODIUM PHOSPHATE 4 MG/ML
12 VIAL (ML) INJECTION ONCE
Refills: 0 | Status: COMPLETED | OUTPATIENT
Start: 2024-10-16 | End: 2024-10-16

## 2024-10-16 RX ORDER — DIPHENHYDRAMINE HCL 25 MG
25 TABLET ORAL ONCE
Refills: 0 | Status: COMPLETED | OUTPATIENT
Start: 2024-10-16 | End: 2024-10-16

## 2024-10-16 RX ORDER — BEVACIZUMAB 400 MG/16ML
900 INJECTION, SOLUTION INTRAVENOUS ONCE
Refills: 0 | Status: COMPLETED | OUTPATIENT
Start: 2024-10-16 | End: 2024-10-16

## 2024-10-16 RX ORDER — ONDANSETRON 4 MG/1
16 TABLET ORAL ONCE
Refills: 0 | Status: COMPLETED | OUTPATIENT
Start: 2024-10-16 | End: 2024-10-16

## 2024-10-16 RX ORDER — ACETAMINOPHEN 80 MG/.8ML
650 SOLUTION/ DROPS ORAL ONCE
Refills: 0 | Status: COMPLETED | OUTPATIENT
Start: 2024-10-16 | End: 2024-10-16

## 2024-10-16 RX ADMIN — ONDANSETRON 116 MILLIGRAM(S): 4 TABLET ORAL at 10:33

## 2024-10-16 RX ADMIN — BEVACIZUMAB 900 MILLIGRAM(S): 400 INJECTION, SOLUTION INTRAVENOUS at 11:13

## 2024-10-16 RX ADMIN — Medication 101 MILLIGRAM(S): at 10:33

## 2024-10-16 RX ADMIN — Medication 106 MILLIGRAM(S): at 10:32

## 2024-10-16 RX ADMIN — OXALIPLATIN 250 MILLIGRAM(S): 5 INJECTION, SOLUTION INTRAVENOUS at 11:14

## 2024-10-16 RX ADMIN — ACETAMINOPHEN 650 MILLIGRAM(S): 80 SOLUTION/ DROPS ORAL at 10:32

## 2024-10-18 NOTE — CDI QUERY NOTE - NSCDIOTHERTXTBX_GEN_ALL_CORE_HH
Clinical documentation and/or evidence of the patient’s presentation, evaluation, and medical management, as evidenced below, may support a diagnosis that is not documented in the medical record.  In order to ensure accurate coding and accuracy of the clinical record, the documentation in this patient’s medical record requires additional clarification.    If you think the supporting documentation and/or clinical evidence supports a more specific diagnosis, please include more specific documentation of a diagnosis associated with these findings in your progress note and/or discharge summary.                                  ====================================  Please clarify if the patient was found to have:    •	Sepsis ruled in   •	Sepsis ruled out after study    •	Other (specify)    Supporting documentation and/or clinical evidence:  H/P: IMPRESSION:     64M from home with PMH: Stage 4 Colon CA with Mets to Liver/Lung sees Dr Murphy, HTN, DM2, on active chemoRx via LUE PICC Line who presents with fevers Tmax 101 and HR > 110 meeting criteria for SIRS and suspected Infection POA (Sepsis) vs SIRS due to Chemotherapy and Malignancy however it's hard to differentiate etiology at this time.     Immunocompromised State due to Stage 4 Colon CA and Active ChemoRx   SIRS w/ suspected infection = Sepsis POA unless infection is ruled out - cover with abx / Rule out CLABSI (PICC Line in place)   Stage 4 Colon CA (hold chemo for now till onc recs)   HypoKalemia      Consult Note Adult-Heme/Onc Physician Assistant/Attending [  #Fever-no clear source- No neutropenia/blood cxs neg at 24hr    ID IMPRESSION  # SIRS on admission, # Fever of unknown origin likely secondary to chemotherapy regimen    Doubt infectious etiology of SIRS  Heavy tumor burden with extensive liver mets  SIRS secondary to recent CT and tumor burden  No evidence of cholangitis  RLL with atelectatic changes  9/30 CT with b/l pleural effusions and atelectasis RLL  WBC 11.1  COVID 19/ Influenza/ RSV NG.     10-04 Last attending note: #Fever,  zosyn  per ID until paracentesis , need to continue post paracentesis ? will ask ID       d/c note: No source of fever found. Likely due to chemo.  PRINCIPAL DISCHARGE DIAGNOSIS.  Diagnosis: Sepsis  Assessment and Plan of Treatment: You came into the ED because you noted you were having fevers. You were admitted for further management of this. You were seen by IR, heme/ onc, and ID to help with management of you condition.  You were given antibiotics during your stay. It is not needed to take any abx once discharged.  Today you got a para from IR . You were cleared to resume your chemo regimen per the heme/onc team.

## 2024-10-21 DIAGNOSIS — C18.9 MALIGNANT NEOPLASM OF COLON, UNSPECIFIED: ICD-10-CM

## 2024-10-21 DIAGNOSIS — Z79.84 LONG TERM (CURRENT) USE OF ORAL HYPOGLYCEMIC DRUGS: ICD-10-CM

## 2024-10-21 DIAGNOSIS — C78.7 SECONDARY MALIGNANT NEOPLASM OF LIVER AND INTRAHEPATIC BILE DUCT: ICD-10-CM

## 2024-10-21 DIAGNOSIS — Z79.82 LONG TERM (CURRENT) USE OF ASPIRIN: ICD-10-CM

## 2024-10-21 DIAGNOSIS — Z79.899 OTHER LONG TERM (CURRENT) DRUG THERAPY: ICD-10-CM

## 2024-10-21 DIAGNOSIS — E11.649 TYPE 2 DIABETES MELLITUS WITH HYPOGLYCEMIA WITHOUT COMA: ICD-10-CM

## 2024-10-21 DIAGNOSIS — E83.42 HYPOMAGNESEMIA: ICD-10-CM

## 2024-10-21 DIAGNOSIS — C78.6 SECONDARY MALIGNANT NEOPLASM OF RETROPERITONEUM AND PERITONEUM: ICD-10-CM

## 2024-10-21 DIAGNOSIS — C78.02 SECONDARY MALIGNANT NEOPLASM OF LEFT LUNG: ICD-10-CM

## 2024-10-21 DIAGNOSIS — C78.01 SECONDARY MALIGNANT NEOPLASM OF RIGHT LUNG: ICD-10-CM

## 2024-10-21 DIAGNOSIS — E87.6 HYPOKALEMIA: ICD-10-CM

## 2024-10-21 DIAGNOSIS — I10 ESSENTIAL (PRIMARY) HYPERTENSION: ICD-10-CM

## 2024-10-21 DIAGNOSIS — E78.5 HYPERLIPIDEMIA, UNSPECIFIED: ICD-10-CM

## 2024-11-02 ENCOUNTER — EMERGENCY (EMERGENCY)
Facility: HOSPITAL | Age: 64
LOS: 0 days | Discharge: HOME | End: 2024-11-02
Attending: EMERGENCY MEDICINE
Payer: MEDICARE

## 2024-11-02 VITALS
TEMPERATURE: 98 F | RESPIRATION RATE: 20 BRPM | OXYGEN SATURATION: 98 % | HEART RATE: 80 BPM | SYSTOLIC BLOOD PRESSURE: 140 MMHG | HEIGHT: 72 IN | DIASTOLIC BLOOD PRESSURE: 74 MMHG

## 2024-11-02 DIAGNOSIS — E11.9 TYPE 2 DIABETES MELLITUS WITHOUT COMPLICATIONS: ICD-10-CM

## 2024-11-02 DIAGNOSIS — C78.6 SECONDARY MALIGNANT NEOPLASM OF RETROPERITONEUM AND PERITONEUM: ICD-10-CM

## 2024-11-02 DIAGNOSIS — I10 ESSENTIAL (PRIMARY) HYPERTENSION: ICD-10-CM

## 2024-11-02 DIAGNOSIS — R42 DIZZINESS AND GIDDINESS: ICD-10-CM

## 2024-11-02 DIAGNOSIS — R53.1 WEAKNESS: ICD-10-CM

## 2024-11-02 DIAGNOSIS — C78.01 SECONDARY MALIGNANT NEOPLASM OF RIGHT LUNG: ICD-10-CM

## 2024-11-02 DIAGNOSIS — R55 SYNCOPE AND COLLAPSE: ICD-10-CM

## 2024-11-02 DIAGNOSIS — C78.7 SECONDARY MALIGNANT NEOPLASM OF LIVER AND INTRAHEPATIC BILE DUCT: ICD-10-CM

## 2024-11-02 DIAGNOSIS — E78.5 HYPERLIPIDEMIA, UNSPECIFIED: ICD-10-CM

## 2024-11-02 DIAGNOSIS — C78.02 SECONDARY MALIGNANT NEOPLASM OF LEFT LUNG: ICD-10-CM

## 2024-11-02 LAB
ALBUMIN SERPL ELPH-MCNC: 4.1 G/DL — SIGNIFICANT CHANGE UP (ref 3.5–5.2)
ALP SERPL-CCNC: 116 U/L — HIGH (ref 30–115)
ALT FLD-CCNC: 15 U/L — SIGNIFICANT CHANGE UP (ref 0–41)
ANION GAP SERPL CALC-SCNC: 12 MMOL/L — SIGNIFICANT CHANGE UP (ref 7–14)
AST SERPL-CCNC: 29 U/L — SIGNIFICANT CHANGE UP (ref 0–41)
BASOPHILS # BLD AUTO: 0.03 K/UL — SIGNIFICANT CHANGE UP (ref 0–0.2)
BASOPHILS NFR BLD AUTO: 0.5 % — SIGNIFICANT CHANGE UP (ref 0–1)
BILIRUB SERPL-MCNC: 0.5 MG/DL — SIGNIFICANT CHANGE UP (ref 0.2–1.2)
BUN SERPL-MCNC: 16 MG/DL — SIGNIFICANT CHANGE UP (ref 10–20)
CALCIUM SERPL-MCNC: 10 MG/DL — SIGNIFICANT CHANGE UP (ref 8.4–10.4)
CHLORIDE SERPL-SCNC: 93 MMOL/L — LOW (ref 98–110)
CO2 SERPL-SCNC: 33 MMOL/L — HIGH (ref 17–32)
CREAT SERPL-MCNC: 0.8 MG/DL — SIGNIFICANT CHANGE UP (ref 0.7–1.5)
EGFR: 98 ML/MIN/1.73M2 — SIGNIFICANT CHANGE UP
EGFR: 98 ML/MIN/1.73M2 — SIGNIFICANT CHANGE UP
EOSINOPHIL # BLD AUTO: 0.01 K/UL — SIGNIFICANT CHANGE UP (ref 0–0.7)
EOSINOPHIL NFR BLD AUTO: 0.2 % — SIGNIFICANT CHANGE UP (ref 0–8)
GLUCOSE SERPL-MCNC: 155 MG/DL — HIGH (ref 70–99)
HCT VFR BLD CALC: 28.7 % — LOW (ref 42–52)
HGB BLD-MCNC: 9.7 G/DL — LOW (ref 14–18)
IMM GRANULOCYTES NFR BLD AUTO: 0.5 % — HIGH (ref 0.1–0.3)
LYMPHOCYTES # BLD AUTO: 1.36 K/UL — SIGNIFICANT CHANGE UP (ref 1.2–3.4)
LYMPHOCYTES # BLD AUTO: 20.5 % — SIGNIFICANT CHANGE UP (ref 20.5–51.1)
MAGNESIUM SERPL-MCNC: 1.4 MG/DL — LOW (ref 1.8–2.4)
MCHC RBC-ENTMCNC: 29.1 PG — SIGNIFICANT CHANGE UP (ref 27–31)
MCHC RBC-ENTMCNC: 33.8 G/DL — SIGNIFICANT CHANGE UP (ref 32–37)
MCV RBC AUTO: 86.2 FL — SIGNIFICANT CHANGE UP (ref 80–94)
MONOCYTES # BLD AUTO: 1.04 K/UL — HIGH (ref 0.1–0.6)
MONOCYTES NFR BLD AUTO: 15.7 % — HIGH (ref 1.7–9.3)
NEUTROPHILS # BLD AUTO: 4.15 K/UL — SIGNIFICANT CHANGE UP (ref 1.4–6.5)
NEUTROPHILS NFR BLD AUTO: 62.6 % — SIGNIFICANT CHANGE UP (ref 42.2–75.2)
NRBC # BLD: 0 /100 WBCS — SIGNIFICANT CHANGE UP (ref 0–0)
NRBC BLD-RTO: 0 /100 WBCS — SIGNIFICANT CHANGE UP (ref 0–0)
PLATELET # BLD AUTO: 331 K/UL — SIGNIFICANT CHANGE UP (ref 130–400)
PMV BLD: 10.5 FL — HIGH (ref 7.4–10.4)
POTASSIUM SERPL-MCNC: 2.9 MMOL/L — LOW (ref 3.5–5)
POTASSIUM SERPL-SCNC: 2.9 MMOL/L — LOW (ref 3.5–5)
PROT SERPL-MCNC: 7.1 G/DL — SIGNIFICANT CHANGE UP (ref 6–8)
RBC # BLD: 3.33 M/UL — LOW (ref 4.7–6.1)
RBC # FLD: 24.7 % — HIGH (ref 11.5–14.5)
SODIUM SERPL-SCNC: 138 MMOL/L — SIGNIFICANT CHANGE UP (ref 135–146)
TROPONIN T, HIGH SENSITIVITY RESULT: 20 NG/L — SIGNIFICANT CHANGE UP (ref 6–21)
TROPONIN T, HIGH SENSITIVITY RESULT: 23 NG/L — HIGH (ref 6–21)
WBC # BLD: 6.62 K/UL — SIGNIFICANT CHANGE UP (ref 4.8–10.8)
WBC # FLD AUTO: 6.62 K/UL — SIGNIFICANT CHANGE UP (ref 4.8–10.8)

## 2024-11-02 PROCEDURE — 93010 ELECTROCARDIOGRAM REPORT: CPT

## 2024-11-02 PROCEDURE — 93005 ELECTROCARDIOGRAM TRACING: CPT

## 2024-11-02 PROCEDURE — 84484 ASSAY OF TROPONIN QUANT: CPT

## 2024-11-02 PROCEDURE — 83735 ASSAY OF MAGNESIUM: CPT

## 2024-11-02 PROCEDURE — 99285 EMERGENCY DEPT VISIT HI MDM: CPT | Mod: 25

## 2024-11-02 PROCEDURE — 71045 X-RAY EXAM CHEST 1 VIEW: CPT | Mod: 26

## 2024-11-02 PROCEDURE — 99285 EMERGENCY DEPT VISIT HI MDM: CPT

## 2024-11-02 PROCEDURE — 71045 X-RAY EXAM CHEST 1 VIEW: CPT

## 2024-11-02 PROCEDURE — 85025 COMPLETE CBC W/AUTO DIFF WBC: CPT

## 2024-11-02 PROCEDURE — 36415 COLL VENOUS BLD VENIPUNCTURE: CPT

## 2024-11-02 PROCEDURE — 96374 THER/PROPH/DIAG INJ IV PUSH: CPT

## 2024-11-02 PROCEDURE — 80053 COMPREHEN METABOLIC PANEL: CPT

## 2024-11-02 PROCEDURE — 96361 HYDRATE IV INFUSION ADD-ON: CPT

## 2024-11-02 PROCEDURE — 96375 TX/PRO/DX INJ NEW DRUG ADDON: CPT

## 2024-11-02 RX ORDER — MAGNESIUM SULFATE 500 MG/ML
2 SYRINGE (ML) INJECTION ONCE
Refills: 0 | Status: COMPLETED | OUTPATIENT
Start: 2024-11-02 | End: 2024-11-02

## 2024-11-02 RX ADMIN — Medication 500 MILLILITER(S): at 17:22

## 2024-11-02 RX ADMIN — Medication 40 MILLIEQUIVALENT(S): at 19:47

## 2024-11-02 RX ADMIN — Medication 500 MILLILITER(S): at 18:01

## 2024-11-02 RX ADMIN — Medication 50 MILLIEQUIVALENT(S): at 19:54

## 2024-11-02 RX ADMIN — Medication 25 GRAM(S): at 19:44

## 2024-11-05 ENCOUNTER — OUTPATIENT (OUTPATIENT)
Dept: OUTPATIENT SERVICES | Facility: HOSPITAL | Age: 64
LOS: 1 days | End: 2024-11-05
Payer: MEDICARE

## 2024-11-05 ENCOUNTER — LABORATORY RESULT (OUTPATIENT)
Age: 64
End: 2024-11-05

## 2024-11-05 ENCOUNTER — APPOINTMENT (OUTPATIENT)
Age: 64
End: 2024-11-05
Payer: MEDICARE

## 2024-11-05 VITALS
WEIGHT: 244 LBS | RESPIRATION RATE: 16 BRPM | DIASTOLIC BLOOD PRESSURE: 84 MMHG | OXYGEN SATURATION: 100 % | HEART RATE: 84 BPM | BODY MASS INDEX: 33.05 KG/M2 | SYSTOLIC BLOOD PRESSURE: 144 MMHG | TEMPERATURE: 98.2 F | HEIGHT: 72 IN

## 2024-11-05 DIAGNOSIS — C18.9 MALIGNANT NEOPLASM OF COLON, UNSPECIFIED: ICD-10-CM

## 2024-11-05 LAB
ALBUMIN SERPL ELPH-MCNC: 3.8 G/DL
ALP BLD-CCNC: 96 U/L
ALT SERPL-CCNC: 13 U/L
ANION GAP SERPL CALC-SCNC: 11 MMOL/L
APPEARANCE: CLEAR
AST SERPL-CCNC: 28 U/L
BILIRUB SERPL-MCNC: 0.4 MG/DL
BILIRUBIN URINE: NEGATIVE
BLOOD URINE: NEGATIVE
BUN SERPL-MCNC: 13 MG/DL
CALCIUM SERPL-MCNC: 9.5 MG/DL
CHLORIDE SERPL-SCNC: 98 MMOL/L
CO2 SERPL-SCNC: 30 MMOL/L
COLOR: NORMAL
CREAT SERPL-MCNC: 0.7 MG/DL
EGFR: 102 ML/MIN/1.73M2
GLUCOSE QUALITATIVE U: NEGATIVE MG/DL
GLUCOSE SERPL-MCNC: 86 MG/DL
HCT VFR BLD CALC: 25.5 %
HGB BLD-MCNC: 9.2 G/DL
KETONES URINE: ABNORMAL MG/DL
LEUKOCYTE ESTERASE URINE: ABNORMAL
MCHC RBC-ENTMCNC: 30 PG
MCHC RBC-ENTMCNC: 36.1 G/DL
MCV RBC AUTO: 83.1 FL
NITRITE URINE: NEGATIVE
PH URINE: 6.5
PLATELET # BLD AUTO: 240 K/UL
PMV BLD: 9.4 FL
POTASSIUM SERPL-SCNC: 3.4 MMOL/L
PROT SERPL-MCNC: 6.5 G/DL
PROTEIN URINE: NORMAL MG/DL
RBC # BLD: 3.07 M/UL
RBC # FLD: 24.9 %
SODIUM SERPL-SCNC: 139 MMOL/L
SPECIFIC GRAVITY URINE: 1.03
UROBILINOGEN URINE: 1 MG/DL
WBC # FLD AUTO: 4.6 K/UL

## 2024-11-05 PROCEDURE — 80053 COMPREHEN METABOLIC PANEL: CPT

## 2024-11-05 PROCEDURE — 99214 OFFICE O/P EST MOD 30 MIN: CPT

## 2024-11-05 PROCEDURE — 85027 COMPLETE CBC AUTOMATED: CPT

## 2024-11-05 PROCEDURE — 82378 CARCINOEMBRYONIC ANTIGEN: CPT

## 2024-11-05 PROCEDURE — 81001 URINALYSIS AUTO W/SCOPE: CPT

## 2024-11-05 PROCEDURE — G2211 COMPLEX E/M VISIT ADD ON: CPT

## 2024-11-06 DIAGNOSIS — C18.9 MALIGNANT NEOPLASM OF COLON, UNSPECIFIED: ICD-10-CM

## 2024-11-06 LAB — CEA SERPL-MCNC: 322 NG/ML

## 2024-11-07 ENCOUNTER — NON-APPOINTMENT (OUTPATIENT)
Age: 64
End: 2024-11-07

## 2024-11-13 ENCOUNTER — OUTPATIENT (OUTPATIENT)
Dept: OUTPATIENT SERVICES | Facility: HOSPITAL | Age: 64
LOS: 1 days | End: 2024-11-13
Payer: MEDICARE

## 2024-11-13 ENCOUNTER — LABORATORY RESULT (OUTPATIENT)
Age: 64
End: 2024-11-13

## 2024-11-13 ENCOUNTER — APPOINTMENT (OUTPATIENT)
Age: 64
End: 2024-11-13

## 2024-11-13 VITALS — TEMPERATURE: 98 F | DIASTOLIC BLOOD PRESSURE: 94 MMHG | HEART RATE: 90 BPM | SYSTOLIC BLOOD PRESSURE: 143 MMHG

## 2024-11-13 DIAGNOSIS — C18.9 MALIGNANT NEOPLASM OF COLON, UNSPECIFIED: ICD-10-CM

## 2024-11-13 LAB
HCT VFR BLD CALC: 29.2 %
HGB BLD-MCNC: 10.1 G/DL
MCHC RBC-ENTMCNC: 29.9 PG
MCHC RBC-ENTMCNC: 34.6 G/DL
MCV RBC AUTO: 86.4 FL
PLATELET # BLD AUTO: 144 K/UL
PMV BLD: 9.8 FL
RBC # BLD: 3.38 M/UL
RBC # FLD: 25.2 %
WBC # FLD AUTO: 4.78 K/UL

## 2024-11-13 PROCEDURE — 96375 TX/PRO/DX INJ NEW DRUG ADDON: CPT

## 2024-11-13 PROCEDURE — 36415 COLL VENOUS BLD VENIPUNCTURE: CPT

## 2024-11-13 PROCEDURE — 96413 CHEMO IV INFUSION 1 HR: CPT

## 2024-11-13 PROCEDURE — 80053 COMPREHEN METABOLIC PANEL: CPT

## 2024-11-13 PROCEDURE — 96417 CHEMO IV INFUS EACH ADDL SEQ: CPT

## 2024-11-13 PROCEDURE — 85027 COMPLETE CBC AUTOMATED: CPT

## 2024-11-13 PROCEDURE — 83735 ASSAY OF MAGNESIUM: CPT

## 2024-11-13 PROCEDURE — 82378 CARCINOEMBRYONIC ANTIGEN: CPT

## 2024-11-13 RX ORDER — ONDANSETRON 4 MG/1
16 TABLET, ORALLY DISINTEGRATING ORAL ONCE
Refills: 0 | Status: COMPLETED | OUTPATIENT
Start: 2024-11-13 | End: 2024-11-13

## 2024-11-13 RX ORDER — DEXAMETHASONE SODIUM PHOSPHATE 4 MG/ML
12 INJECTION, SOLUTION INTRA-ARTICULAR; INTRALESIONAL; INTRAMUSCULAR; INTRAVENOUS; SOFT TISSUE ONCE
Refills: 0 | Status: COMPLETED | OUTPATIENT
Start: 2024-11-13 | End: 2024-11-13

## 2024-11-13 RX ORDER — BEVACIZUMAB-BVZR 100 MG/4ML
550 INJECTION, SOLUTION INTRAVENOUS ONCE
Refills: 0 | Status: COMPLETED | OUTPATIENT
Start: 2024-11-13 | End: 2024-11-13

## 2024-11-13 RX ORDER — DIPHENHYDRAMINE HCL 25 MG
25 CAPSULE ORAL ONCE
Refills: 0 | Status: COMPLETED | OUTPATIENT
Start: 2024-11-13 | End: 2024-11-13

## 2024-11-13 RX ORDER — OXALIPLATIN 5 MG/ML
160 INJECTION, SOLUTION INTRAVENOUS ONCE
Refills: 0 | Status: COMPLETED | OUTPATIENT
Start: 2024-11-13 | End: 2024-11-13

## 2024-11-13 RX ORDER — ACETAMINOPHEN 160 MG/5ML
650 SUSPENSION ORAL ONCE
Refills: 0 | Status: COMPLETED | OUTPATIENT
Start: 2024-11-13 | End: 2024-11-13

## 2024-11-13 RX ADMIN — Medication 25 MILLIGRAM(S): at 15:45

## 2024-11-13 RX ADMIN — DEXAMETHASONE SODIUM PHOSPHATE 106 MILLIGRAM(S): 4 INJECTION, SOLUTION INTRA-ARTICULAR; INTRALESIONAL; INTRAMUSCULAR; INTRAVENOUS; SOFT TISSUE at 15:15

## 2024-11-13 RX ADMIN — DEXAMETHASONE SODIUM PHOSPHATE 12 MILLIGRAM(S): 4 INJECTION, SOLUTION INTRA-ARTICULAR; INTRALESIONAL; INTRAMUSCULAR; INTRAVENOUS; SOFT TISSUE at 15:30

## 2024-11-13 RX ADMIN — BEVACIZUMAB-BVZR 550 MILLIGRAM(S): 100 INJECTION, SOLUTION INTRAVENOUS at 15:50

## 2024-11-13 RX ADMIN — OXALIPLATIN 160 MILLIGRAM(S): 5 INJECTION, SOLUTION INTRAVENOUS at 15:20

## 2024-11-13 RX ADMIN — ONDANSETRON 116 MILLIGRAM(S): 4 TABLET, ORALLY DISINTEGRATING ORAL at 15:00

## 2024-11-13 RX ADMIN — BEVACIZUMAB-BVZR 550 MILLIGRAM(S): 100 INJECTION, SOLUTION INTRAVENOUS at 16:20

## 2024-11-13 RX ADMIN — ACETAMINOPHEN 650 MILLIGRAM(S): 160 SUSPENSION ORAL at 15:03

## 2024-11-13 RX ADMIN — ONDANSETRON 16 MILLIGRAM(S): 4 TABLET, ORALLY DISINTEGRATING ORAL at 15:15

## 2024-11-13 RX ADMIN — Medication 101 MILLIGRAM(S): at 15:30

## 2024-11-15 LAB
ALBUMIN SERPL ELPH-MCNC: 3.7 G/DL
ALP BLD-CCNC: 90 U/L
ALT SERPL-CCNC: 11 U/L
ANION GAP SERPL CALC-SCNC: 8 MMOL/L
AST SERPL-CCNC: 27 U/L
BILIRUB SERPL-MCNC: 0.5 MG/DL
BUN SERPL-MCNC: 10 MG/DL
CALCIUM SERPL-MCNC: 8.8 MG/DL
CEA SERPL-MCNC: 218 NG/ML
CHLORIDE SERPL-SCNC: 101 MMOL/L
CO2 SERPL-SCNC: 28 MMOL/L
CREAT SERPL-MCNC: 0.8 MG/DL
EGFR: 98 ML/MIN/1.73M2
GLUCOSE SERPL-MCNC: 96 MG/DL
MAGNESIUM SERPL-MCNC: 2 MG/DL
POTASSIUM SERPL-SCNC: 3.6 MMOL/L
PROT SERPL-MCNC: 6.5 G/DL
SODIUM SERPL-SCNC: 137 MMOL/L

## 2024-11-26 ENCOUNTER — APPOINTMENT (OUTPATIENT)
Age: 64
End: 2024-11-26
Payer: MEDICARE

## 2024-11-26 ENCOUNTER — LABORATORY RESULT (OUTPATIENT)
Age: 64
End: 2024-11-26

## 2024-11-26 ENCOUNTER — OUTPATIENT (OUTPATIENT)
Dept: OUTPATIENT SERVICES | Facility: HOSPITAL | Age: 64
LOS: 1 days | End: 2024-11-26

## 2024-11-26 ENCOUNTER — OUTPATIENT (OUTPATIENT)
Dept: OUTPATIENT SERVICES | Facility: HOSPITAL | Age: 64
LOS: 1 days | End: 2024-11-26
Payer: MEDICARE

## 2024-11-26 ENCOUNTER — APPOINTMENT (OUTPATIENT)
Age: 64
End: 2024-11-26

## 2024-11-26 VITALS — SYSTOLIC BLOOD PRESSURE: 149 MMHG | TEMPERATURE: 99 F | HEART RATE: 88 BPM | DIASTOLIC BLOOD PRESSURE: 83 MMHG

## 2024-11-26 VITALS
TEMPERATURE: 99.3 F | OXYGEN SATURATION: 100 % | RESPIRATION RATE: 16 BRPM | HEART RATE: 88 BPM | DIASTOLIC BLOOD PRESSURE: 83 MMHG | SYSTOLIC BLOOD PRESSURE: 149 MMHG | WEIGHT: 258.25 LBS | HEIGHT: 72 IN | BODY MASS INDEX: 34.98 KG/M2

## 2024-11-26 DIAGNOSIS — C18.9 MALIGNANT NEOPLASM OF COLON, UNSPECIFIED: ICD-10-CM

## 2024-11-26 LAB
HCT VFR BLD CALC: 27.5 %
HGB BLD-MCNC: 9.4 G/DL
MCHC RBC-ENTMCNC: 30.6 PG
MCHC RBC-ENTMCNC: 34.2 G/DL
MCV RBC AUTO: 89.6 FL
PLATELET # BLD AUTO: 379 K/UL
PMV BLD: 9.5 FL
RBC # BLD: 3.07 M/UL
RBC # FLD: 23.7 %
WBC # FLD AUTO: 8.23 K/UL

## 2024-11-26 PROCEDURE — 85027 COMPLETE CBC AUTOMATED: CPT

## 2024-11-26 PROCEDURE — 99214 OFFICE O/P EST MOD 30 MIN: CPT

## 2024-11-26 RX ORDER — DIPHENHYDRAMINE HCL 25 MG
25 CAPSULE ORAL ONCE
Refills: 0 | Status: COMPLETED | OUTPATIENT
Start: 2024-11-26 | End: 2024-11-26

## 2024-11-26 RX ORDER — BEVACIZUMAB-BVZR 100 MG/4ML
550 INJECTION, SOLUTION INTRAVENOUS ONCE
Refills: 0 | Status: COMPLETED | OUTPATIENT
Start: 2024-11-26 | End: 2024-11-26

## 2024-11-26 RX ORDER — ONDANSETRON 4 MG/1
16 TABLET, ORALLY DISINTEGRATING ORAL ONCE
Refills: 0 | Status: COMPLETED | OUTPATIENT
Start: 2024-11-26 | End: 2024-11-26

## 2024-11-26 RX ORDER — ACETAMINOPHEN 160 MG/5ML
650 SUSPENSION ORAL ONCE
Refills: 0 | Status: COMPLETED | OUTPATIENT
Start: 2024-11-26 | End: 2024-11-26

## 2024-11-26 RX ORDER — OXALIPLATIN 5 MG/ML
160 INJECTION, SOLUTION INTRAVENOUS ONCE
Refills: 0 | Status: COMPLETED | OUTPATIENT
Start: 2024-11-26 | End: 2024-11-26

## 2024-11-26 RX ORDER — DEXAMETHASONE SODIUM PHOSPHATE 4 MG/ML
12 INJECTION, SOLUTION INTRA-ARTICULAR; INTRALESIONAL; INTRAMUSCULAR; INTRAVENOUS; SOFT TISSUE ONCE
Refills: 0 | Status: COMPLETED | OUTPATIENT
Start: 2024-11-26 | End: 2024-11-26

## 2024-11-26 RX ADMIN — OXALIPLATIN 160 MILLIGRAM(S): 5 INJECTION, SOLUTION INTRAVENOUS at 15:55

## 2024-11-26 RX ADMIN — ONDANSETRON 116 MILLIGRAM(S): 4 TABLET, ORALLY DISINTEGRATING ORAL at 12:30

## 2024-11-26 RX ADMIN — BEVACIZUMAB-BVZR 550 MILLIGRAM(S): 100 INJECTION, SOLUTION INTRAVENOUS at 13:25

## 2024-11-26 RX ADMIN — BEVACIZUMAB-BVZR 550 MILLIGRAM(S): 100 INJECTION, SOLUTION INTRAVENOUS at 13:55

## 2024-11-26 RX ADMIN — Medication 101 MILLIGRAM(S): at 13:00

## 2024-11-26 RX ADMIN — Medication 25 MILLIGRAM(S): at 13:15

## 2024-11-26 RX ADMIN — OXALIPLATIN 160 MILLIGRAM(S): 5 INJECTION, SOLUTION INTRAVENOUS at 13:55

## 2024-11-26 RX ADMIN — ACETAMINOPHEN 650 MILLIGRAM(S): 160 SUSPENSION ORAL at 12:30

## 2024-11-26 RX ADMIN — ONDANSETRON 16 MILLIGRAM(S): 4 TABLET, ORALLY DISINTEGRATING ORAL at 12:50

## 2024-11-26 RX ADMIN — DEXAMETHASONE SODIUM PHOSPHATE 12 MILLIGRAM(S): 4 INJECTION, SOLUTION INTRA-ARTICULAR; INTRALESIONAL; INTRAMUSCULAR; INTRAVENOUS; SOFT TISSUE at 13:00

## 2024-11-26 RX ADMIN — DEXAMETHASONE SODIUM PHOSPHATE 106 MILLIGRAM(S): 4 INJECTION, SOLUTION INTRA-ARTICULAR; INTRALESIONAL; INTRAMUSCULAR; INTRAVENOUS; SOFT TISSUE at 12:50

## 2024-11-27 ENCOUNTER — APPOINTMENT (OUTPATIENT)
Age: 64
End: 2024-11-27

## 2024-11-27 LAB
ALBUMIN SERPL ELPH-MCNC: 3.9 G/DL
ALP BLD-CCNC: 87 U/L
ALT SERPL-CCNC: 10 U/L
ANION GAP SERPL CALC-SCNC: 10 MMOL/L
APPEARANCE: CLEAR
AST SERPL-CCNC: 25 U/L
BILIRUB SERPL-MCNC: 0.4 MG/DL
BILIRUBIN URINE: NEGATIVE
BLOOD URINE: NEGATIVE
BUN SERPL-MCNC: 9 MG/DL
CALCIUM SERPL-MCNC: 8.9 MG/DL
CEA SERPL-MCNC: 210 NG/ML
CHLORIDE SERPL-SCNC: 102 MMOL/L
CO2 SERPL-SCNC: 27 MMOL/L
COLOR: YELLOW
CREAT SERPL-MCNC: 0.9 MG/DL
EGFR: 95 ML/MIN/1.73M2
GLUCOSE QUALITATIVE U: NEGATIVE MG/DL
GLUCOSE SERPL-MCNC: 87 MG/DL
KETONES URINE: NEGATIVE MG/DL
LEUKOCYTE ESTERASE URINE: NEGATIVE
NITRITE URINE: NEGATIVE
PH URINE: 6
POTASSIUM SERPL-SCNC: 3.9 MMOL/L
PROT SERPL-MCNC: 7 G/DL
PROTEIN URINE: NEGATIVE MG/DL
SODIUM SERPL-SCNC: 139 MMOL/L
SPECIFIC GRAVITY URINE: 1.01
UROBILINOGEN URINE: 0.2 MG/DL

## 2024-12-10 ENCOUNTER — OUTPATIENT (OUTPATIENT)
Dept: OUTPATIENT SERVICES | Facility: HOSPITAL | Age: 64
LOS: 1 days | End: 2024-12-10
Payer: MEDICARE

## 2024-12-10 ENCOUNTER — APPOINTMENT (OUTPATIENT)
Age: 64
End: 2024-12-10
Payer: MEDICARE

## 2024-12-10 ENCOUNTER — LABORATORY RESULT (OUTPATIENT)
Age: 64
End: 2024-12-10

## 2024-12-10 VITALS
DIASTOLIC BLOOD PRESSURE: 82 MMHG | SYSTOLIC BLOOD PRESSURE: 127 MMHG | HEIGHT: 72 IN | TEMPERATURE: 98.9 F | HEART RATE: 98 BPM | WEIGHT: 256 LBS | BODY MASS INDEX: 34.67 KG/M2 | OXYGEN SATURATION: 98 % | RESPIRATION RATE: 16 BRPM

## 2024-12-10 DIAGNOSIS — C18.9 MALIGNANT NEOPLASM OF COLON, UNSPECIFIED: ICD-10-CM

## 2024-12-10 LAB
HCT VFR BLD CALC: 28.1 %
HGB BLD-MCNC: 9.9 G/DL
MCHC RBC-ENTMCNC: 31.4 PG
MCHC RBC-ENTMCNC: 35.2 G/DL
MCV RBC AUTO: 89.2 FL
PLATELET # BLD AUTO: 274 K/UL
PMV BLD: 10.5 FL
RBC # BLD: 3.15 M/UL
RBC # FLD: 21.2 %
WBC # FLD AUTO: 7.75 K/UL

## 2024-12-10 PROCEDURE — G2211 COMPLEX E/M VISIT ADD ON: CPT

## 2024-12-10 PROCEDURE — 36415 COLL VENOUS BLD VENIPUNCTURE: CPT

## 2024-12-10 PROCEDURE — 82378 CARCINOEMBRYONIC ANTIGEN: CPT

## 2024-12-10 PROCEDURE — 99213 OFFICE O/P EST LOW 20 MIN: CPT

## 2024-12-10 PROCEDURE — 96375 TX/PRO/DX INJ NEW DRUG ADDON: CPT

## 2024-12-10 PROCEDURE — 96413 CHEMO IV INFUSION 1 HR: CPT

## 2024-12-10 PROCEDURE — 96417 CHEMO IV INFUS EACH ADDL SEQ: CPT

## 2024-12-10 PROCEDURE — 96415 CHEMO IV INFUSION ADDL HR: CPT

## 2024-12-10 PROCEDURE — 85027 COMPLETE CBC AUTOMATED: CPT

## 2024-12-10 PROCEDURE — 80053 COMPREHEN METABOLIC PANEL: CPT

## 2024-12-10 RX ORDER — OXALIPLATIN 5 MG/ML
160 INJECTION, SOLUTION, CONCENTRATE INTRAVENOUS ONCE
Refills: 0 | Status: COMPLETED | OUTPATIENT
Start: 2024-12-10 | End: 2024-12-10

## 2024-12-10 RX ORDER — BEVACIZUMAB-MALY 400 MG/16ML
550 INJECTION, SOLUTION INTRAVENOUS ONCE
Refills: 0 | Status: COMPLETED | OUTPATIENT
Start: 2024-12-10 | End: 2024-12-10

## 2024-12-10 RX ORDER — DIPHENHYDRAMINE HCL 12.5MG/5ML
25 ELIXIR ORAL ONCE
Refills: 0 | Status: COMPLETED | OUTPATIENT
Start: 2024-12-10 | End: 2024-12-10

## 2024-12-10 RX ORDER — DEXAMETHASONE 0.5 MG/1
12 TABLET ORAL ONCE
Refills: 0 | Status: COMPLETED | OUTPATIENT
Start: 2024-12-10 | End: 2024-12-10

## 2024-12-10 RX ORDER — ONDANSETRON HCL/PF 4 MG/2 ML
16 VIAL (ML) INJECTION ONCE
Refills: 0 | Status: COMPLETED | OUTPATIENT
Start: 2024-12-10 | End: 2024-12-10

## 2024-12-10 RX ORDER — ACETAMINOPHEN 500 MG/5ML
650 LIQUID (ML) ORAL ONCE
Refills: 0 | Status: COMPLETED | OUTPATIENT
Start: 2024-12-10 | End: 2024-12-10

## 2024-12-10 RX ADMIN — Medication 16 MILLIGRAM(S): at 15:15

## 2024-12-10 RX ADMIN — OXALIPLATIN 160 MILLIGRAM(S): 5 INJECTION, SOLUTION, CONCENTRATE INTRAVENOUS at 18:00

## 2024-12-10 RX ADMIN — DEXAMETHASONE 12 MILLIGRAM(S): 0.5 TABLET ORAL at 15:00

## 2024-12-10 RX ADMIN — Medication 25 MILLIGRAM(S): at 15:30

## 2024-12-10 RX ADMIN — BEVACIZUMAB-MALY 550 MILLIGRAM(S): 400 INJECTION, SOLUTION INTRAVENOUS at 16:00

## 2024-12-10 RX ADMIN — Medication 650 MILLIGRAM(S): at 14:41

## 2024-12-10 RX ADMIN — BEVACIZUMAB-MALY 550 MILLIGRAM(S): 400 INJECTION, SOLUTION INTRAVENOUS at 15:30

## 2024-12-10 RX ADMIN — DEXAMETHASONE 100 MILLIGRAM(S): 0.5 TABLET ORAL at 14:45

## 2024-12-10 RX ADMIN — OXALIPLATIN 160 MILLIGRAM(S): 5 INJECTION, SOLUTION, CONCENTRATE INTRAVENOUS at 16:00

## 2024-12-10 RX ADMIN — Medication 100 MILLIGRAM(S): at 15:00

## 2024-12-10 RX ADMIN — Medication 100 MILLIGRAM(S): at 15:15

## 2024-12-11 DIAGNOSIS — C18.9 MALIGNANT NEOPLASM OF COLON, UNSPECIFIED: ICD-10-CM

## 2024-12-12 LAB
ALBUMIN SERPL ELPH-MCNC: 3.9 G/DL
ALP BLD-CCNC: 103 U/L
ALT SERPL-CCNC: 27 U/L
ANION GAP SERPL CALC-SCNC: 11 MMOL/L
APPEARANCE: CLEAR
AST SERPL-CCNC: 48 U/L
BILIRUB SERPL-MCNC: 0.2 MG/DL
BILIRUBIN URINE: NEGATIVE
BLOOD URINE: NEGATIVE
BUN SERPL-MCNC: 13 MG/DL
CALCIUM SERPL-MCNC: 9.3 MG/DL
CEA SERPL-MCNC: 147 NG/ML
CHLORIDE SERPL-SCNC: 100 MMOL/L
CO2 SERPL-SCNC: 27 MMOL/L
COLOR: NORMAL
CREAT SERPL-MCNC: 0.8 MG/DL
EGFR: 98 ML/MIN/1.73M2
GLUCOSE QUALITATIVE U: NEGATIVE MG/DL
GLUCOSE SERPL-MCNC: 124 MG/DL
KETONES URINE: ABNORMAL MG/DL
LEUKOCYTE ESTERASE URINE: NEGATIVE
NITRITE URINE: NEGATIVE
PH URINE: 5
POTASSIUM SERPL-SCNC: 4 MMOL/L
PROT SERPL-MCNC: 7 G/DL
PROTEIN URINE: NORMAL MG/DL
SODIUM SERPL-SCNC: 138 MMOL/L
SPECIFIC GRAVITY URINE: 1.03
UROBILINOGEN URINE: 1 MG/DL

## 2024-12-23 ENCOUNTER — LABORATORY RESULT (OUTPATIENT)
Age: 64
End: 2024-12-23

## 2024-12-23 ENCOUNTER — OUTPATIENT (OUTPATIENT)
Dept: OUTPATIENT SERVICES | Facility: HOSPITAL | Age: 64
LOS: 1 days | End: 2024-12-23
Payer: MEDICARE

## 2024-12-23 ENCOUNTER — APPOINTMENT (OUTPATIENT)
Age: 64
End: 2024-12-23
Payer: MEDICARE

## 2024-12-23 VITALS
OXYGEN SATURATION: 100 % | DIASTOLIC BLOOD PRESSURE: 84 MMHG | HEIGHT: 72 IN | SYSTOLIC BLOOD PRESSURE: 134 MMHG | TEMPERATURE: 98 F | HEART RATE: 94 BPM | WEIGHT: 259.8 LBS | BODY MASS INDEX: 35.19 KG/M2 | RESPIRATION RATE: 17 BRPM

## 2024-12-23 DIAGNOSIS — C18.9 MALIGNANT NEOPLASM OF COLON, UNSPECIFIED: ICD-10-CM

## 2024-12-23 LAB
APPEARANCE: CLEAR
BILIRUBIN URINE: ABNORMAL
BLOOD URINE: NEGATIVE
COLOR: NORMAL
GLUCOSE QUALITATIVE U: NEGATIVE MG/DL
HCT VFR BLD CALC: 28.7 %
HGB BLD-MCNC: 10.2 G/DL
KETONES URINE: ABNORMAL MG/DL
LEUKOCYTE ESTERASE URINE: ABNORMAL
MCHC RBC-ENTMCNC: 32.1 PG
MCHC RBC-ENTMCNC: 35.5 G/DL
MCV RBC AUTO: 90.3 FL
NITRITE URINE: NEGATIVE
PH URINE: 5
PLATELET # BLD AUTO: 230 K/UL
PMV BLD: 10.3 FL
PROTEIN URINE: 30 MG/DL
RBC # BLD: 3.18 M/UL
RBC # FLD: 19.2 %
SPECIFIC GRAVITY URINE: 1.03
UROBILINOGEN URINE: 1 MG/DL
WBC # FLD AUTO: 7.27 K/UL

## 2024-12-23 PROCEDURE — G2211 COMPLEX E/M VISIT ADD ON: CPT

## 2024-12-23 PROCEDURE — 99214 OFFICE O/P EST MOD 30 MIN: CPT

## 2024-12-23 PROCEDURE — 85027 COMPLETE CBC AUTOMATED: CPT

## 2024-12-23 PROCEDURE — 81001 URINALYSIS AUTO W/SCOPE: CPT

## 2024-12-24 ENCOUNTER — APPOINTMENT (OUTPATIENT)
Age: 64
End: 2024-12-24

## 2024-12-24 ENCOUNTER — OUTPATIENT (OUTPATIENT)
Dept: OUTPATIENT SERVICES | Facility: HOSPITAL | Age: 64
LOS: 1 days | End: 2024-12-24
Payer: MEDICARE

## 2024-12-24 VITALS — TEMPERATURE: 98 F | HEART RATE: 89 BPM | DIASTOLIC BLOOD PRESSURE: 87 MMHG | SYSTOLIC BLOOD PRESSURE: 149 MMHG

## 2024-12-24 DIAGNOSIS — C18.9 MALIGNANT NEOPLASM OF COLON, UNSPECIFIED: ICD-10-CM

## 2024-12-24 LAB
ALBUMIN SERPL ELPH-MCNC: 4.1 G/DL
ALP BLD-CCNC: 82 U/L
ALT SERPL-CCNC: 11 U/L
ANION GAP SERPL CALC-SCNC: 13 MMOL/L
AST SERPL-CCNC: 27 U/L
BILIRUB SERPL-MCNC: 0.3 MG/DL
BUN SERPL-MCNC: 9 MG/DL
CALCIUM SERPL-MCNC: 9.1 MG/DL
CHLORIDE SERPL-SCNC: 104 MMOL/L
CO2 SERPL-SCNC: 24 MMOL/L
CREAT SERPL-MCNC: 0.7 MG/DL
EGFR: 102 ML/MIN/1.73M2
GLUCOSE SERPL-MCNC: 87 MG/DL
POTASSIUM SERPL-SCNC: 3.6 MMOL/L
PROT SERPL-MCNC: 7 G/DL
SODIUM SERPL-SCNC: 141 MMOL/L

## 2024-12-24 PROCEDURE — 96417 CHEMO IV INFUS EACH ADDL SEQ: CPT

## 2024-12-24 PROCEDURE — 82378 CARCINOEMBRYONIC ANTIGEN: CPT

## 2024-12-24 PROCEDURE — 96415 CHEMO IV INFUSION ADDL HR: CPT

## 2024-12-24 PROCEDURE — 80053 COMPREHEN METABOLIC PANEL: CPT

## 2024-12-24 PROCEDURE — 96413 CHEMO IV INFUSION 1 HR: CPT

## 2024-12-24 PROCEDURE — 96375 TX/PRO/DX INJ NEW DRUG ADDON: CPT

## 2024-12-24 RX ORDER — ONDANSETRON HCL/PF 4 MG/2 ML
16 VIAL (ML) INJECTION ONCE
Refills: 0 | Status: COMPLETED | OUTPATIENT
Start: 2024-12-24 | End: 2024-12-24

## 2024-12-24 RX ORDER — BEVACIZUMAB-MALY 400 MG/16ML
550 INJECTION, SOLUTION INTRAVENOUS ONCE
Refills: 0 | Status: COMPLETED | OUTPATIENT
Start: 2024-12-24 | End: 2024-12-24

## 2024-12-24 RX ORDER — DEXAMETHASONE 0.5 MG/1
12 TABLET ORAL ONCE
Refills: 0 | Status: COMPLETED | OUTPATIENT
Start: 2024-12-24 | End: 2024-12-24

## 2024-12-24 RX ORDER — ACETAMINOPHEN 500 MG/5ML
650 LIQUID (ML) ORAL ONCE
Refills: 0 | Status: COMPLETED | OUTPATIENT
Start: 2024-12-24 | End: 2024-12-24

## 2024-12-24 RX ORDER — OXALIPLATIN 5 MG/ML
160 INJECTION, SOLUTION, CONCENTRATE INTRAVENOUS ONCE
Refills: 0 | Status: COMPLETED | OUTPATIENT
Start: 2024-12-24 | End: 2024-12-24

## 2024-12-24 RX ORDER — DIPHENHYDRAMINE HCL 12.5MG/5ML
25 ELIXIR ORAL ONCE
Refills: 0 | Status: COMPLETED | OUTPATIENT
Start: 2024-12-24 | End: 2024-12-24

## 2024-12-24 RX ADMIN — Medication 16 MILLIGRAM(S): at 13:35

## 2024-12-24 RX ADMIN — Medication 100 MILLIGRAM(S): at 13:50

## 2024-12-24 RX ADMIN — OXALIPLATIN 160 MILLIGRAM(S): 5 INJECTION, SOLUTION, CONCENTRATE INTRAVENOUS at 14:35

## 2024-12-24 RX ADMIN — Medication 650 MILLIGRAM(S): at 13:20

## 2024-12-24 RX ADMIN — DEXAMETHASONE 12 MILLIGRAM(S): 0.5 TABLET ORAL at 13:50

## 2024-12-24 RX ADMIN — BEVACIZUMAB-MALY 550 MILLIGRAM(S): 400 INJECTION, SOLUTION INTRAVENOUS at 14:35

## 2024-12-24 RX ADMIN — BEVACIZUMAB-MALY 550 MILLIGRAM(S): 400 INJECTION, SOLUTION INTRAVENOUS at 14:05

## 2024-12-24 RX ADMIN — Medication 25 MILLIGRAM(S): at 14:05

## 2024-12-24 RX ADMIN — Medication 100 MILLIGRAM(S): at 13:21

## 2024-12-24 RX ADMIN — DEXAMETHASONE 100 MILLIGRAM(S): 0.5 TABLET ORAL at 13:35

## 2024-12-29 LAB — CEA SERPL-MCNC: 114 NG/ML

## 2024-12-31 ENCOUNTER — OUTPATIENT (OUTPATIENT)
Dept: OUTPATIENT SERVICES | Facility: HOSPITAL | Age: 64
LOS: 1 days | End: 2024-12-31
Payer: MEDICARE

## 2024-12-31 ENCOUNTER — RESULT REVIEW (OUTPATIENT)
Age: 64
End: 2024-12-31

## 2024-12-31 DIAGNOSIS — Z00.8 ENCOUNTER FOR OTHER GENERAL EXAMINATION: ICD-10-CM

## 2024-12-31 DIAGNOSIS — C18.9 MALIGNANT NEOPLASM OF COLON, UNSPECIFIED: ICD-10-CM

## 2024-12-31 PROCEDURE — 74177 CT ABD & PELVIS W/CONTRAST: CPT

## 2024-12-31 PROCEDURE — 71260 CT THORAX DX C+: CPT | Mod: 26

## 2024-12-31 PROCEDURE — 71260 CT THORAX DX C+: CPT

## 2024-12-31 PROCEDURE — 74177 CT ABD & PELVIS W/CONTRAST: CPT | Mod: 26

## 2025-01-01 DIAGNOSIS — C18.9 MALIGNANT NEOPLASM OF COLON, UNSPECIFIED: ICD-10-CM

## 2025-01-07 ENCOUNTER — LABORATORY RESULT (OUTPATIENT)
Age: 65
End: 2025-01-07

## 2025-01-07 ENCOUNTER — OUTPATIENT (OUTPATIENT)
Dept: OUTPATIENT SERVICES | Facility: HOSPITAL | Age: 65
LOS: 1 days | End: 2025-01-07
Payer: MEDICARE

## 2025-01-07 ENCOUNTER — APPOINTMENT (OUTPATIENT)
Age: 65
End: 2025-01-07
Payer: MEDICARE

## 2025-01-07 VITALS
BODY MASS INDEX: 34.95 KG/M2 | TEMPERATURE: 98.5 F | HEIGHT: 72 IN | SYSTOLIC BLOOD PRESSURE: 152 MMHG | OXYGEN SATURATION: 98 % | WEIGHT: 258 LBS | HEART RATE: 93 BPM | DIASTOLIC BLOOD PRESSURE: 86 MMHG

## 2025-01-07 DIAGNOSIS — C18.9 MALIGNANT NEOPLASM OF COLON, UNSPECIFIED: ICD-10-CM

## 2025-01-07 DIAGNOSIS — Z51.11 ENCOUNTER FOR ANTINEOPLASTIC CHEMOTHERAPY: ICD-10-CM

## 2025-01-07 LAB
ALBUMIN SERPL ELPH-MCNC: 4.1 G/DL
ALP BLD-CCNC: 66 U/L
ALT SERPL-CCNC: 14 U/L
ANION GAP SERPL CALC-SCNC: 11 MMOL/L
APPEARANCE: CLEAR
AST SERPL-CCNC: 72 U/L
BILIRUB SERPL-MCNC: 0.3 MG/DL
BILIRUBIN URINE: NEGATIVE
BLOOD URINE: NEGATIVE
BUN SERPL-MCNC: 9 MG/DL
CALCIUM SERPL-MCNC: 8.8 MG/DL
CHLORIDE SERPL-SCNC: 102 MMOL/L
CO2 SERPL-SCNC: 25 MMOL/L
COLOR: NORMAL
CREAT SERPL-MCNC: 0.8 MG/DL
EGFR: 98 ML/MIN/1.73M2
GLUCOSE QUALITATIVE U: NEGATIVE MG/DL
GLUCOSE SERPL-MCNC: 109 MG/DL
HCT VFR BLD CALC: 27.3 %
HGB BLD-MCNC: 9.6 G/DL
KETONES URINE: ABNORMAL MG/DL
LEUKOCYTE ESTERASE URINE: ABNORMAL
MCHC RBC-ENTMCNC: 32.1 PG
MCHC RBC-ENTMCNC: 35.2 G/DL
MCV RBC AUTO: 91.3 FL
NITRITE URINE: NEGATIVE
PH URINE: 5
PLATELET # BLD AUTO: 208 K/UL
PMV BLD: 11.1 FL
POTASSIUM SERPL-SCNC: 5.6 MMOL/L
PROT SERPL-MCNC: 7.2 G/DL
PROTEIN URINE: NORMAL MG/DL
RBC # BLD: 2.99 M/UL
RBC # FLD: 17.6 %
SODIUM SERPL-SCNC: 138 MMOL/L
SPECIFIC GRAVITY URINE: 1.02
UROBILINOGEN URINE: 0.2 MG/DL
WBC # FLD AUTO: 6.01 K/UL

## 2025-01-07 PROCEDURE — 85027 COMPLETE CBC AUTOMATED: CPT

## 2025-01-07 PROCEDURE — 99213 OFFICE O/P EST LOW 20 MIN: CPT

## 2025-01-07 PROCEDURE — 96415 CHEMO IV INFUSION ADDL HR: CPT

## 2025-01-07 PROCEDURE — 36415 COLL VENOUS BLD VENIPUNCTURE: CPT

## 2025-01-07 PROCEDURE — 96413 CHEMO IV INFUSION 1 HR: CPT

## 2025-01-07 PROCEDURE — 96417 CHEMO IV INFUS EACH ADDL SEQ: CPT

## 2025-01-07 PROCEDURE — 81001 URINALYSIS AUTO W/SCOPE: CPT

## 2025-01-07 PROCEDURE — 82378 CARCINOEMBRYONIC ANTIGEN: CPT

## 2025-01-07 PROCEDURE — G2211 COMPLEX E/M VISIT ADD ON: CPT

## 2025-01-07 PROCEDURE — 80053 COMPREHEN METABOLIC PANEL: CPT

## 2025-01-07 PROCEDURE — 96375 TX/PRO/DX INJ NEW DRUG ADDON: CPT

## 2025-01-07 RX ORDER — DIPHENHYDRAMINE HCL 12.5MG/5ML
25 ELIXIR ORAL ONCE
Refills: 0 | Status: COMPLETED | OUTPATIENT
Start: 2025-01-07 | End: 2025-01-07

## 2025-01-07 RX ORDER — BEVACIZUMAB-MALY 400 MG/16ML
550 INJECTION, SOLUTION INTRAVENOUS ONCE
Refills: 0 | Status: COMPLETED | OUTPATIENT
Start: 2025-01-07 | End: 2025-01-07

## 2025-01-07 RX ORDER — ONDANSETRON HCL/PF 4 MG/2 ML
16 VIAL (ML) INJECTION ONCE
Refills: 0 | Status: COMPLETED | OUTPATIENT
Start: 2025-01-07 | End: 2025-01-07

## 2025-01-07 RX ORDER — OXALIPLATIN 5 MG/ML
160 INJECTION, SOLUTION, CONCENTRATE INTRAVENOUS ONCE
Refills: 0 | Status: COMPLETED | OUTPATIENT
Start: 2025-01-07 | End: 2025-01-07

## 2025-01-07 RX ORDER — ACETAMINOPHEN 500 MG/5ML
650 LIQUID (ML) ORAL ONCE
Refills: 0 | Status: COMPLETED | OUTPATIENT
Start: 2025-01-07 | End: 2025-01-07

## 2025-01-07 RX ORDER — DEXAMETHASONE 0.5 MG/1
12 TABLET ORAL ONCE
Refills: 0 | Status: COMPLETED | OUTPATIENT
Start: 2025-01-07 | End: 2025-01-07

## 2025-01-07 RX ADMIN — OXALIPLATIN 160 MILLIGRAM(S): 5 INJECTION, SOLUTION, CONCENTRATE INTRAVENOUS at 12:07

## 2025-01-07 RX ADMIN — Medication 650 MILLIGRAM(S): at 11:49

## 2025-01-07 RX ADMIN — BEVACIZUMAB-MALY 550 MILLIGRAM(S): 400 INJECTION, SOLUTION INTRAVENOUS at 12:06

## 2025-01-07 RX ADMIN — Medication 100 MILLIGRAM(S): at 11:48

## 2025-01-07 RX ADMIN — DEXAMETHASONE 100 MILLIGRAM(S): 0.5 TABLET ORAL at 11:49

## 2025-01-07 RX ADMIN — Medication 100 MILLIGRAM(S): at 11:47

## 2025-01-08 DIAGNOSIS — C18.9 MALIGNANT NEOPLASM OF COLON, UNSPECIFIED: ICD-10-CM

## 2025-01-08 LAB — CEA SERPL-MCNC: 96 NG/ML

## 2025-01-10 ENCOUNTER — RESULT REVIEW (OUTPATIENT)
Age: 65
End: 2025-01-10

## 2025-01-10 ENCOUNTER — OUTPATIENT (OUTPATIENT)
Dept: OUTPATIENT SERVICES | Facility: HOSPITAL | Age: 65
LOS: 1 days | End: 2025-01-10
Payer: MEDICARE

## 2025-01-10 DIAGNOSIS — C18.9 MALIGNANT NEOPLASM OF COLON, UNSPECIFIED: ICD-10-CM

## 2025-01-10 LAB — GLUCOSE BLDC GLUCOMTR-MCNC: 110 MG/DL — HIGH (ref 70–99)

## 2025-01-10 PROCEDURE — 78815 PET IMAGE W/CT SKULL-THIGH: CPT | Mod: 26,PS

## 2025-01-10 PROCEDURE — A9552: CPT

## 2025-01-10 PROCEDURE — 82962 GLUCOSE BLOOD TEST: CPT

## 2025-01-10 PROCEDURE — 78815 PET IMAGE W/CT SKULL-THIGH: CPT | Mod: PS

## 2025-01-11 DIAGNOSIS — C18.9 MALIGNANT NEOPLASM OF COLON, UNSPECIFIED: ICD-10-CM

## 2025-01-21 ENCOUNTER — APPOINTMENT (OUTPATIENT)
Age: 65
End: 2025-01-21
Payer: MEDICARE

## 2025-01-21 ENCOUNTER — LABORATORY RESULT (OUTPATIENT)
Age: 65
End: 2025-01-21

## 2025-01-21 ENCOUNTER — OUTPATIENT (OUTPATIENT)
Dept: OUTPATIENT SERVICES | Facility: HOSPITAL | Age: 65
LOS: 1 days | End: 2025-01-21
Payer: MEDICARE

## 2025-01-21 VITALS
HEART RATE: 103 BPM | TEMPERATURE: 98.1 F | OXYGEN SATURATION: 98 % | HEIGHT: 72 IN | SYSTOLIC BLOOD PRESSURE: 150 MMHG | RESPIRATION RATE: 16 BRPM | BODY MASS INDEX: 33.32 KG/M2 | WEIGHT: 246 LBS | DIASTOLIC BLOOD PRESSURE: 84 MMHG

## 2025-01-21 DIAGNOSIS — C18.9 MALIGNANT NEOPLASM OF COLON, UNSPECIFIED: ICD-10-CM

## 2025-01-21 DIAGNOSIS — Z51.11 ENCOUNTER FOR ANTINEOPLASTIC CHEMOTHERAPY: ICD-10-CM

## 2025-01-21 LAB
ALBUMIN SERPL ELPH-MCNC: 4.6 G/DL
ALP BLD-CCNC: 74 U/L
ALT SERPL-CCNC: 9 U/L
ANION GAP SERPL CALC-SCNC: 14 MMOL/L
APPEARANCE: CLEAR
AST SERPL-CCNC: 23 U/L
BILIRUB SERPL-MCNC: 0.4 MG/DL
BILIRUBIN URINE: NEGATIVE
BLOOD URINE: NEGATIVE
BUN SERPL-MCNC: 7 MG/DL
CALCIUM SERPL-MCNC: 9.9 MG/DL
CHLORIDE SERPL-SCNC: 103 MMOL/L
CO2 SERPL-SCNC: 25 MMOL/L
COLOR: YELLOW
CREAT SERPL-MCNC: 0.9 MG/DL
EGFR: 95 ML/MIN/1.73M2
GLUCOSE QUALITATIVE U: NEGATIVE MG/DL
GLUCOSE SERPL-MCNC: 104 MG/DL
HCT VFR BLD CALC: 29.2 %
HGB BLD-MCNC: 10.5 G/DL
KETONES URINE: NEGATIVE MG/DL
LEUKOCYTE ESTERASE URINE: NEGATIVE
MAGNESIUM SERPL-MCNC: 2.1 MG/DL
MCHC RBC-ENTMCNC: 31.7 PG
MCHC RBC-ENTMCNC: 36 G/DL
MCV RBC AUTO: 88.2 FL
NITRITE URINE: NEGATIVE
PH URINE: 7
PLATELET # BLD AUTO: 184 K/UL
PMV BLD: 11.1 FL
POTASSIUM SERPL-SCNC: 3.8 MMOL/L
PROT SERPL-MCNC: 8.1 G/DL
PROTEIN URINE: NEGATIVE MG/DL
RBC # BLD: 3.31 M/UL
RBC # FLD: 16.8 %
SODIUM SERPL-SCNC: 142 MMOL/L
SPECIFIC GRAVITY URINE: 1.01
UROBILINOGEN URINE: 0.2 MG/DL
WBC # FLD AUTO: 6.45 K/UL

## 2025-01-21 PROCEDURE — 85027 COMPLETE CBC AUTOMATED: CPT

## 2025-01-21 PROCEDURE — G2211 COMPLEX E/M VISIT ADD ON: CPT

## 2025-01-21 PROCEDURE — 99214 OFFICE O/P EST MOD 30 MIN: CPT

## 2025-01-21 PROCEDURE — 96413 CHEMO IV INFUSION 1 HR: CPT

## 2025-01-21 PROCEDURE — 96375 TX/PRO/DX INJ NEW DRUG ADDON: CPT

## 2025-01-21 PROCEDURE — 80053 COMPREHEN METABOLIC PANEL: CPT

## 2025-01-21 PROCEDURE — 96417 CHEMO IV INFUS EACH ADDL SEQ: CPT

## 2025-01-21 PROCEDURE — 82378 CARCINOEMBRYONIC ANTIGEN: CPT

## 2025-01-21 PROCEDURE — 83735 ASSAY OF MAGNESIUM: CPT

## 2025-01-21 PROCEDURE — 96367 TX/PROPH/DG ADDL SEQ IV INF: CPT

## 2025-01-21 RX ORDER — ACETAMINOPHEN 500 MG/5ML
650 LIQUID (ML) ORAL ONCE
Refills: 0 | Status: COMPLETED | OUTPATIENT
Start: 2025-01-21 | End: 2025-01-21

## 2025-01-21 RX ORDER — ONDANSETRON HCL/PF 4 MG/2 ML
16 VIAL (ML) INJECTION ONCE
Refills: 0 | Status: COMPLETED | OUTPATIENT
Start: 2025-01-21 | End: 2025-01-21

## 2025-01-21 RX ORDER — DIPHENHYDRAMINE HCL 12.5MG/5ML
25 ELIXIR ORAL ONCE
Refills: 0 | Status: COMPLETED | OUTPATIENT
Start: 2025-01-21 | End: 2025-01-21

## 2025-01-21 RX ORDER — BEVACIZUMAB-MALY 400 MG/16ML
550 INJECTION, SOLUTION INTRAVENOUS ONCE
Refills: 0 | Status: COMPLETED | OUTPATIENT
Start: 2025-01-21 | End: 2025-01-21

## 2025-01-21 RX ORDER — OXALIPLATIN 5 MG/ML
160 INJECTION, SOLUTION, CONCENTRATE INTRAVENOUS ONCE
Refills: 0 | Status: COMPLETED | OUTPATIENT
Start: 2025-01-21 | End: 2025-01-21

## 2025-01-21 RX ORDER — DEXAMETHASONE 0.5 MG/1
12 TABLET ORAL ONCE
Refills: 0 | Status: COMPLETED | OUTPATIENT
Start: 2025-01-21 | End: 2025-01-21

## 2025-01-21 RX ADMIN — Medication 100 MILLIGRAM(S): at 13:30

## 2025-01-21 RX ADMIN — OXALIPLATIN 160 MILLIGRAM(S): 5 INJECTION, SOLUTION, CONCENTRATE INTRAVENOUS at 13:35

## 2025-01-21 RX ADMIN — DEXAMETHASONE 12 MILLIGRAM(S): 0.5 TABLET ORAL at 13:30

## 2025-01-21 RX ADMIN — DEXAMETHASONE 100 MILLIGRAM(S): 0.5 TABLET ORAL at 13:20

## 2025-01-21 RX ADMIN — Medication 16 MILLIGRAM(S): at 13:20

## 2025-01-21 RX ADMIN — Medication 100 MILLIGRAM(S): at 13:10

## 2025-01-21 RX ADMIN — BEVACIZUMAB-MALY 550 MILLIGRAM(S): 400 INJECTION, SOLUTION INTRAVENOUS at 13:34

## 2025-01-21 RX ADMIN — Medication 25 MILLIGRAM(S): at 13:50

## 2025-01-21 RX ADMIN — Medication 650 MILLIGRAM(S): at 13:09

## 2025-01-22 LAB — CEA SERPL-MCNC: 83.9 NG/ML

## 2025-02-04 ENCOUNTER — LABORATORY RESULT (OUTPATIENT)
Age: 65
End: 2025-02-04

## 2025-02-04 ENCOUNTER — APPOINTMENT (OUTPATIENT)
Age: 65
End: 2025-02-04
Payer: MEDICARE

## 2025-02-04 ENCOUNTER — OUTPATIENT (OUTPATIENT)
Dept: OUTPATIENT SERVICES | Facility: HOSPITAL | Age: 65
LOS: 1 days | End: 2025-02-04
Payer: MEDICARE

## 2025-02-04 VITALS — SYSTOLIC BLOOD PRESSURE: 136 MMHG | DIASTOLIC BLOOD PRESSURE: 80 MMHG | HEART RATE: 91 BPM | TEMPERATURE: 100 F

## 2025-02-04 VITALS
TEMPERATURE: 98.7 F | OXYGEN SATURATION: 98 % | RESPIRATION RATE: 16 BRPM | BODY MASS INDEX: 32.64 KG/M2 | WEIGHT: 241 LBS | DIASTOLIC BLOOD PRESSURE: 82 MMHG | HEIGHT: 72 IN | SYSTOLIC BLOOD PRESSURE: 142 MMHG | HEART RATE: 90 BPM

## 2025-02-04 DIAGNOSIS — C18.9 MALIGNANT NEOPLASM OF COLON, UNSPECIFIED: ICD-10-CM

## 2025-02-04 DIAGNOSIS — Z51.11 ENCOUNTER FOR ANTINEOPLASTIC CHEMOTHERAPY: ICD-10-CM

## 2025-02-04 LAB
ALBUMIN SERPL ELPH-MCNC: 4.1 G/DL
ALP BLD-CCNC: 60 U/L
ALT SERPL-CCNC: 7 U/L
ANION GAP SERPL CALC-SCNC: 10 MMOL/L
APPEARANCE: CLEAR
AST SERPL-CCNC: 22 U/L
BILIRUB SERPL-MCNC: 0.4 MG/DL
BILIRUBIN URINE: NEGATIVE
BLOOD URINE: NEGATIVE
BUN SERPL-MCNC: 6 MG/DL
CALCIUM SERPL-MCNC: 9.2 MG/DL
CHLORIDE SERPL-SCNC: 104 MMOL/L
CO2 SERPL-SCNC: 26 MMOL/L
COLOR: YELLOW
CREAT SERPL-MCNC: 0.9 MG/DL
EGFR: 95 ML/MIN/1.73M2
GLUCOSE QUALITATIVE U: NEGATIVE MG/DL
GLUCOSE SERPL-MCNC: 87 MG/DL
HCT VFR BLD CALC: 28.6 %
HGB BLD-MCNC: 9.8 G/DL
KETONES URINE: NEGATIVE MG/DL
LEUKOCYTE ESTERASE URINE: NEGATIVE
MCHC RBC-ENTMCNC: 31.4 PG
MCHC RBC-ENTMCNC: 34.3 G/DL
MCV RBC AUTO: 91.7 FL
NITRITE URINE: NEGATIVE
PH URINE: 6
PLATELET # BLD AUTO: 212 K/UL
PMV BLD: 10.9 FL
POTASSIUM SERPL-SCNC: 4.3 MMOL/L
PROT SERPL-MCNC: 7.1 G/DL
PROTEIN URINE: NEGATIVE MG/DL
RBC # BLD: 3.12 M/UL
RBC # FLD: 17.1 %
SODIUM SERPL-SCNC: 140 MMOL/L
SPECIFIC GRAVITY URINE: 1.01
UROBILINOGEN URINE: 0.2 MG/DL
WBC # FLD AUTO: 5.45 K/UL

## 2025-02-04 PROCEDURE — 85027 COMPLETE CBC AUTOMATED: CPT

## 2025-02-04 PROCEDURE — 96413 CHEMO IV INFUSION 1 HR: CPT

## 2025-02-04 PROCEDURE — 99214 OFFICE O/P EST MOD 30 MIN: CPT

## 2025-02-04 PROCEDURE — 96415 CHEMO IV INFUSION ADDL HR: CPT

## 2025-02-04 PROCEDURE — 36415 COLL VENOUS BLD VENIPUNCTURE: CPT

## 2025-02-04 PROCEDURE — 82378 CARCINOEMBRYONIC ANTIGEN: CPT

## 2025-02-04 PROCEDURE — 80053 COMPREHEN METABOLIC PANEL: CPT

## 2025-02-04 PROCEDURE — 96417 CHEMO IV INFUS EACH ADDL SEQ: CPT

## 2025-02-04 PROCEDURE — 96375 TX/PRO/DX INJ NEW DRUG ADDON: CPT

## 2025-02-04 RX ORDER — DIPHENHYDRAMINE HCL 12.5MG/5ML
25 ELIXIR ORAL ONCE
Refills: 0 | Status: COMPLETED | OUTPATIENT
Start: 2025-02-04 | End: 2025-02-04

## 2025-02-04 RX ORDER — DEXAMETHASONE 0.5 MG/1
12 TABLET ORAL ONCE
Refills: 0 | Status: COMPLETED | OUTPATIENT
Start: 2025-02-04 | End: 2025-02-04

## 2025-02-04 RX ORDER — OXALIPLATIN 5 MG/ML
160 INJECTION, SOLUTION, CONCENTRATE INTRAVENOUS ONCE
Refills: 0 | Status: COMPLETED | OUTPATIENT
Start: 2025-02-04 | End: 2025-02-04

## 2025-02-04 RX ORDER — ONDANSETRON HCL/PF 4 MG/2 ML
16 VIAL (ML) INJECTION ONCE
Refills: 0 | Status: COMPLETED | OUTPATIENT
Start: 2025-02-04 | End: 2025-02-04

## 2025-02-04 RX ORDER — ACETAMINOPHEN 500 MG/5ML
650 LIQUID (ML) ORAL ONCE
Refills: 0 | Status: COMPLETED | OUTPATIENT
Start: 2025-02-04 | End: 2025-02-04

## 2025-02-04 RX ORDER — BEVACIZUMAB-MALY 400 MG/16ML
550 INJECTION, SOLUTION INTRAVENOUS ONCE
Refills: 0 | Status: COMPLETED | OUTPATIENT
Start: 2025-02-04 | End: 2025-02-04

## 2025-02-04 RX ADMIN — OXALIPLATIN 160 MILLIGRAM(S): 5 INJECTION, SOLUTION, CONCENTRATE INTRAVENOUS at 14:00

## 2025-02-04 RX ADMIN — Medication 25 MILLIGRAM(S): at 13:15

## 2025-02-04 RX ADMIN — OXALIPLATIN 160 MILLIGRAM(S): 5 INJECTION, SOLUTION, CONCENTRATE INTRAVENOUS at 16:00

## 2025-02-04 RX ADMIN — Medication 100 MILLIGRAM(S): at 12:45

## 2025-02-04 RX ADMIN — DEXAMETHASONE 100 MILLIGRAM(S): 0.5 TABLET ORAL at 12:30

## 2025-02-04 RX ADMIN — BEVACIZUMAB-MALY 550 MILLIGRAM(S): 400 INJECTION, SOLUTION INTRAVENOUS at 13:50

## 2025-02-04 RX ADMIN — BEVACIZUMAB-MALY 550 MILLIGRAM(S): 400 INJECTION, SOLUTION INTRAVENOUS at 13:20

## 2025-02-04 RX ADMIN — Medication 100 MILLIGRAM(S): at 13:00

## 2025-02-04 RX ADMIN — Medication 16 MILLIGRAM(S): at 13:00

## 2025-02-04 RX ADMIN — Medication 650 MILLIGRAM(S): at 12:30

## 2025-02-04 RX ADMIN — DEXAMETHASONE 12 MILLIGRAM(S): 0.5 TABLET ORAL at 12:45

## 2025-02-05 DIAGNOSIS — C18.9 MALIGNANT NEOPLASM OF COLON, UNSPECIFIED: ICD-10-CM

## 2025-02-05 LAB — CEA SERPL-MCNC: 78.6 NG/ML

## 2025-02-18 ENCOUNTER — LABORATORY RESULT (OUTPATIENT)
Age: 65
End: 2025-02-18

## 2025-02-18 ENCOUNTER — APPOINTMENT (OUTPATIENT)
Age: 65
End: 2025-02-18
Payer: MEDICARE

## 2025-02-18 ENCOUNTER — OUTPATIENT (OUTPATIENT)
Dept: OUTPATIENT SERVICES | Facility: HOSPITAL | Age: 65
LOS: 1 days | End: 2025-02-18
Payer: MEDICARE

## 2025-02-18 VITALS
HEIGHT: 72 IN | BODY MASS INDEX: 32.78 KG/M2 | TEMPERATURE: 98.6 F | DIASTOLIC BLOOD PRESSURE: 77 MMHG | RESPIRATION RATE: 16 BRPM | WEIGHT: 242 LBS | SYSTOLIC BLOOD PRESSURE: 147 MMHG | HEART RATE: 94 BPM | OXYGEN SATURATION: 100 %

## 2025-02-18 VITALS — HEART RATE: 94 BPM | SYSTOLIC BLOOD PRESSURE: 147 MMHG | DIASTOLIC BLOOD PRESSURE: 77 MMHG | TEMPERATURE: 99 F

## 2025-02-18 DIAGNOSIS — C18.9 MALIGNANT NEOPLASM OF COLON, UNSPECIFIED: ICD-10-CM

## 2025-02-18 DIAGNOSIS — Z51.11 ENCOUNTER FOR ANTINEOPLASTIC CHEMOTHERAPY: ICD-10-CM

## 2025-02-18 LAB
ALBUMIN SERPL ELPH-MCNC: 4.4 G/DL
ALP BLD-CCNC: 67 U/L
ALT SERPL-CCNC: 10 U/L
ANION GAP SERPL CALC-SCNC: 12 MMOL/L
AST SERPL-CCNC: 26 U/L
AUTO BASOPHILS #: 0.05 K/UL
AUTO BASOPHILS %: 0.8 %
AUTO EOSINOPHILS #: 0.07 K/UL
AUTO EOSINOPHILS %: 1.1 %
AUTO IMMATURE GRANULOCYTES #: 0.02 K/UL
AUTO LYMPHOCYTES #: 1.78 K/UL
AUTO LYMPHOCYTES %: 26.7 %
AUTO MONOCYTES #: 1.09 K/UL
AUTO MONOCYTES %: 16.4 %
AUTO NEUTROPHILS #: 3.65 K/UL
AUTO NEUTROPHILS %: 54.7 %
AUTO NRBC #: 0 K/UL
BILIRUB SERPL-MCNC: 0.4 MG/DL
BUN SERPL-MCNC: 9 MG/DL
CALCIUM SERPL-MCNC: 9.4 MG/DL
CHLORIDE SERPL-SCNC: 103 MMOL/L
CO2 SERPL-SCNC: 26 MMOL/L
CREAT SERPL-MCNC: 0.8 MG/DL
EGFR: 98 ML/MIN/1.73M2
GLUCOSE SERPL-MCNC: 114 MG/DL
HCT VFR BLD CALC: 28.8 %
HGB BLD-MCNC: 10 G/DL
IMM GRANULOCYTES NFR BLD AUTO: 0.3 %
MAN DIFF?: NORMAL
MCHC RBC-ENTMCNC: 32.2 PG
MCHC RBC-ENTMCNC: 34.7 G/DL
MCV RBC AUTO: 92.6 FL
PLATELET # BLD AUTO: 216 K/UL
PMV BLD AUTO: 0 /100 WBCS
PMV BLD: 9.9 FL
POTASSIUM SERPL-SCNC: 3.8 MMOL/L
PROT SERPL-MCNC: 7.4 G/DL
RBC # BLD: 3.11 M/UL
RBC # FLD: 17.5 %
SODIUM SERPL-SCNC: 141 MMOL/L
WBC # FLD AUTO: 6.66 K/UL

## 2025-02-18 PROCEDURE — 96417 CHEMO IV INFUS EACH ADDL SEQ: CPT

## 2025-02-18 PROCEDURE — 85025 COMPLETE CBC W/AUTO DIFF WBC: CPT

## 2025-02-18 PROCEDURE — 36415 COLL VENOUS BLD VENIPUNCTURE: CPT

## 2025-02-18 PROCEDURE — 99214 OFFICE O/P EST MOD 30 MIN: CPT

## 2025-02-18 PROCEDURE — 81001 URINALYSIS AUTO W/SCOPE: CPT

## 2025-02-18 PROCEDURE — 82378 CARCINOEMBRYONIC ANTIGEN: CPT

## 2025-02-18 PROCEDURE — 80053 COMPREHEN METABOLIC PANEL: CPT

## 2025-02-18 PROCEDURE — 96375 TX/PRO/DX INJ NEW DRUG ADDON: CPT

## 2025-02-18 PROCEDURE — 96367 TX/PROPH/DG ADDL SEQ IV INF: CPT

## 2025-02-18 PROCEDURE — 96413 CHEMO IV INFUSION 1 HR: CPT

## 2025-02-18 PROCEDURE — 96415 CHEMO IV INFUSION ADDL HR: CPT

## 2025-02-18 RX ORDER — ONDANSETRON HCL/PF 4 MG/2 ML
16 VIAL (ML) INJECTION ONCE
Refills: 0 | Status: COMPLETED | OUTPATIENT
Start: 2025-02-18 | End: 2025-02-18

## 2025-02-18 RX ORDER — DIPHENHYDRAMINE HCL 12.5MG/5ML
25 ELIXIR ORAL ONCE
Refills: 0 | Status: COMPLETED | OUTPATIENT
Start: 2025-02-18 | End: 2025-02-18

## 2025-02-18 RX ORDER — BEVACIZUMAB-MALY 400 MG/16ML
550 INJECTION, SOLUTION INTRAVENOUS ONCE
Refills: 0 | Status: COMPLETED | OUTPATIENT
Start: 2025-02-18 | End: 2025-02-18

## 2025-02-18 RX ORDER — ACETAMINOPHEN 500 MG/5ML
650 LIQUID (ML) ORAL ONCE
Refills: 0 | Status: COMPLETED | OUTPATIENT
Start: 2025-02-18 | End: 2025-02-18

## 2025-02-18 RX ORDER — OXALIPLATIN 5 MG/ML
160 INJECTION, SOLUTION, CONCENTRATE INTRAVENOUS ONCE
Refills: 0 | Status: COMPLETED | OUTPATIENT
Start: 2025-02-18 | End: 2025-02-18

## 2025-02-18 RX ORDER — DEXAMETHASONE 0.5 MG/1
12 TABLET ORAL ONCE
Refills: 0 | Status: COMPLETED | OUTPATIENT
Start: 2025-02-18 | End: 2025-02-18

## 2025-02-18 RX ADMIN — Medication 16 MILLIGRAM(S): at 15:11

## 2025-02-18 RX ADMIN — OXALIPLATIN 160 MILLIGRAM(S): 5 INJECTION, SOLUTION, CONCENTRATE INTRAVENOUS at 18:20

## 2025-02-18 RX ADMIN — BEVACIZUMAB-MALY 550 MILLIGRAM(S): 400 INJECTION, SOLUTION INTRAVENOUS at 15:50

## 2025-02-18 RX ADMIN — BEVACIZUMAB-MALY 550 MILLIGRAM(S): 400 INJECTION, SOLUTION INTRAVENOUS at 16:20

## 2025-02-18 RX ADMIN — DEXAMETHASONE 100 MILLIGRAM(S): 0.5 TABLET ORAL at 15:11

## 2025-02-18 RX ADMIN — Medication 100 MILLIGRAM(S): at 14:56

## 2025-02-18 RX ADMIN — Medication 650 MILLIGRAM(S): at 14:55

## 2025-02-18 RX ADMIN — DEXAMETHASONE 12 MILLIGRAM(S): 0.5 TABLET ORAL at 15:26

## 2025-02-18 RX ADMIN — Medication 100 MILLIGRAM(S): at 15:26

## 2025-02-18 RX ADMIN — Medication 25 MILLIGRAM(S): at 15:46

## 2025-02-18 RX ADMIN — OXALIPLATIN 160 MILLIGRAM(S): 5 INJECTION, SOLUTION, CONCENTRATE INTRAVENOUS at 16:20

## 2025-02-19 ENCOUNTER — OUTPATIENT (OUTPATIENT)
Dept: OUTPATIENT SERVICES | Facility: HOSPITAL | Age: 65
LOS: 1 days | End: 2025-02-19
Payer: MEDICARE

## 2025-02-19 ENCOUNTER — RESULT REVIEW (OUTPATIENT)
Age: 65
End: 2025-02-19

## 2025-02-19 DIAGNOSIS — Z00.8 ENCOUNTER FOR OTHER GENERAL EXAMINATION: ICD-10-CM

## 2025-02-19 DIAGNOSIS — C18.9 MALIGNANT NEOPLASM OF COLON, UNSPECIFIED: ICD-10-CM

## 2025-02-19 LAB
APPEARANCE: CLEAR
BILIRUBIN URINE: NEGATIVE
BLOOD URINE: NEGATIVE
CEA SERPL-MCNC: 88.7 NG/ML
COLOR: YELLOW
GLUCOSE QUALITATIVE U: NEGATIVE MG/DL
KETONES URINE: ABNORMAL MG/DL
LEUKOCYTE ESTERASE URINE: ABNORMAL
NITRITE URINE: NEGATIVE
PH URINE: 6
PROTEIN URINE: NORMAL MG/DL
SPECIFIC GRAVITY URINE: 1.02
UROBILINOGEN URINE: 0.2 MG/DL

## 2025-02-19 PROCEDURE — 71250 CT THORAX DX C-: CPT | Mod: 26

## 2025-02-19 PROCEDURE — 71250 CT THORAX DX C-: CPT

## 2025-02-20 DIAGNOSIS — C18.9 MALIGNANT NEOPLASM OF COLON, UNSPECIFIED: ICD-10-CM

## 2025-03-04 ENCOUNTER — APPOINTMENT (OUTPATIENT)
Age: 65
End: 2025-03-04
Payer: MEDICARE

## 2025-03-04 ENCOUNTER — LABORATORY RESULT (OUTPATIENT)
Age: 65
End: 2025-03-04

## 2025-03-04 ENCOUNTER — OUTPATIENT (OUTPATIENT)
Dept: OUTPATIENT SERVICES | Facility: HOSPITAL | Age: 65
LOS: 1 days | End: 2025-03-04
Payer: MEDICARE

## 2025-03-04 ENCOUNTER — NON-APPOINTMENT (OUTPATIENT)
Age: 65
End: 2025-03-04

## 2025-03-04 VITALS
OXYGEN SATURATION: 99 % | SYSTOLIC BLOOD PRESSURE: 150 MMHG | HEIGHT: 72 IN | TEMPERATURE: 98.6 F | DIASTOLIC BLOOD PRESSURE: 80 MMHG | BODY MASS INDEX: 32.23 KG/M2 | WEIGHT: 238 LBS | HEART RATE: 91 BPM | RESPIRATION RATE: 16 BRPM

## 2025-03-04 VITALS — SYSTOLIC BLOOD PRESSURE: 150 MMHG | HEART RATE: 91 BPM | DIASTOLIC BLOOD PRESSURE: 80 MMHG | TEMPERATURE: 99 F

## 2025-03-04 VITALS — HEIGHT: 72 IN | WEIGHT: 238.54 LBS | BODY MASS INDEX: 32.31 KG/M2

## 2025-03-04 DIAGNOSIS — C18.9 MALIGNANT NEOPLASM OF COLON, UNSPECIFIED: ICD-10-CM

## 2025-03-04 DIAGNOSIS — Z51.11 ENCOUNTER FOR ANTINEOPLASTIC CHEMOTHERAPY: ICD-10-CM

## 2025-03-04 LAB
AUTO BASOPHILS #: 0.03 K/UL
AUTO BASOPHILS %: 0.6 %
AUTO EOSINOPHILS #: 0.05 K/UL
AUTO EOSINOPHILS %: 1 %
AUTO IMMATURE GRANULOCYTES #: 0.01 K/UL
AUTO LYMPHOCYTES #: 1.26 K/UL
AUTO LYMPHOCYTES %: 26 %
AUTO MONOCYTES #: 1.01 K/UL
AUTO MONOCYTES %: 20.8 %
AUTO NEUTROPHILS #: 2.49 K/UL
AUTO NEUTROPHILS %: 51.4 %
AUTO NRBC #: 0 K/UL
HCT VFR BLD CALC: 29.4 %
HGB BLD-MCNC: 10 G/DL
IMM GRANULOCYTES NFR BLD AUTO: 0.2 %
MAN DIFF?: NORMAL
MCHC RBC-ENTMCNC: 31.7 PG
MCHC RBC-ENTMCNC: 34 G/DL
MCV RBC AUTO: 93.3 FL
PLATELET # BLD AUTO: 249 K/UL
PMV BLD AUTO: 0 /100 WBCS
PMV BLD: 10.7 FL
RBC # BLD: 3.15 M/UL
RBC # FLD: 17.5 %
WBC # FLD AUTO: 4.85 K/UL

## 2025-03-04 PROCEDURE — 85025 COMPLETE CBC W/AUTO DIFF WBC: CPT

## 2025-03-04 PROCEDURE — 81001 URINALYSIS AUTO W/SCOPE: CPT

## 2025-03-04 PROCEDURE — 96375 TX/PRO/DX INJ NEW DRUG ADDON: CPT

## 2025-03-04 PROCEDURE — G2211 COMPLEX E/M VISIT ADD ON: CPT

## 2025-03-04 PROCEDURE — 96417 CHEMO IV INFUS EACH ADDL SEQ: CPT

## 2025-03-04 PROCEDURE — 99214 OFFICE O/P EST MOD 30 MIN: CPT

## 2025-03-04 PROCEDURE — 96413 CHEMO IV INFUSION 1 HR: CPT

## 2025-03-04 PROCEDURE — 82378 CARCINOEMBRYONIC ANTIGEN: CPT

## 2025-03-04 PROCEDURE — 80053 COMPREHEN METABOLIC PANEL: CPT

## 2025-03-04 PROCEDURE — 96367 TX/PROPH/DG ADDL SEQ IV INF: CPT

## 2025-03-04 RX ORDER — ONDANSETRON 8 MG/1
8 TABLET, ORALLY DISINTEGRATING ORAL
Qty: 30 | Refills: 1 | Status: DISCONTINUED | COMMUNITY
Start: 2025-03-04 | End: 2025-03-04

## 2025-03-04 RX ORDER — FOSAPREPITANT 150 MG/5ML
150 INJECTION, POWDER, LYOPHILIZED, FOR SOLUTION INTRAVENOUS ONCE
Refills: 0 | Status: COMPLETED | OUTPATIENT
Start: 2025-03-04 | End: 2025-03-04

## 2025-03-04 RX ORDER — METOCLOPRAMIDE 5 MG/1
5 TABLET ORAL 3 TIMES DAILY
Qty: 42 | Refills: 0 | Status: ACTIVE | COMMUNITY
Start: 2025-03-04 | End: 1900-01-01

## 2025-03-04 RX ORDER — PALONOSETRON HYDROCHLORIDE 0.05 MG/ML
0.25 INJECTION, SOLUTION INTRAVENOUS ONCE
Refills: 0 | Status: COMPLETED | OUTPATIENT
Start: 2025-03-04 | End: 2025-03-04

## 2025-03-04 RX ORDER — DEXAMETHASONE 0.5 MG/1
12 TABLET ORAL ONCE
Refills: 0 | Status: COMPLETED | OUTPATIENT
Start: 2025-03-04 | End: 2025-03-04

## 2025-03-04 RX ORDER — IRINOTECAN HYDROCHLORIDE 20 MG/ML
410 INJECTION, SOLUTION INTRAVENOUS ONCE
Refills: 0 | Status: COMPLETED | OUTPATIENT
Start: 2025-03-04 | End: 2025-03-04

## 2025-03-04 RX ORDER — BEVACIZUMAB-MALY 400 MG/16ML
540 INJECTION, SOLUTION INTRAVENOUS ONCE
Refills: 0 | Status: COMPLETED | OUTPATIENT
Start: 2025-03-04 | End: 2025-03-04

## 2025-03-04 RX ADMIN — FOSAPREPITANT 500 MILLIGRAM(S): 150 INJECTION, POWDER, LYOPHILIZED, FOR SOLUTION INTRAVENOUS at 14:40

## 2025-03-04 RX ADMIN — DEXAMETHASONE 12 MILLIGRAM(S): 0.5 TABLET ORAL at 14:40

## 2025-03-04 RX ADMIN — Medication 0.5 MILLIGRAM(S): at 15:46

## 2025-03-04 RX ADMIN — BEVACIZUMAB-MALY 540 MILLIGRAM(S): 400 INJECTION, SOLUTION INTRAVENOUS at 15:45

## 2025-03-04 RX ADMIN — BEVACIZUMAB-MALY 540 MILLIGRAM(S): 400 INJECTION, SOLUTION INTRAVENOUS at 15:15

## 2025-03-04 RX ADMIN — DEXAMETHASONE 100 MILLIGRAM(S): 0.5 TABLET ORAL at 14:30

## 2025-03-04 RX ADMIN — FOSAPREPITANT 150 MILLIGRAM(S): 150 INJECTION, POWDER, LYOPHILIZED, FOR SOLUTION INTRAVENOUS at 15:10

## 2025-03-04 RX ADMIN — PALONOSETRON HYDROCHLORIDE 0.25 MILLIGRAM(S): 0.05 INJECTION, SOLUTION INTRAVENOUS at 14:22

## 2025-03-04 RX ADMIN — IRINOTECAN HYDROCHLORIDE 410 MILLIGRAM(S): 20 INJECTION, SOLUTION INTRAVENOUS at 15:50

## 2025-03-05 DIAGNOSIS — C18.9 MALIGNANT NEOPLASM OF COLON, UNSPECIFIED: ICD-10-CM

## 2025-03-05 LAB
ALBUMIN SERPL ELPH-MCNC: 4 G/DL
ALP BLD-CCNC: 67 U/L
ALT SERPL-CCNC: 11 U/L
ANION GAP SERPL CALC-SCNC: 13 MMOL/L
APPEARANCE: CLEAR
AST SERPL-CCNC: 23 U/L
BILIRUB SERPL-MCNC: 0.4 MG/DL
BILIRUBIN URINE: NEGATIVE
BLOOD URINE: NEGATIVE
BUN SERPL-MCNC: 6 MG/DL
CALCIUM SERPL-MCNC: 9.1 MG/DL
CEA SERPL-MCNC: 84.8 NG/ML
CHLORIDE SERPL-SCNC: 102 MMOL/L
CO2 SERPL-SCNC: 26 MMOL/L
COLOR: YELLOW
CREAT SERPL-MCNC: 0.8 MG/DL
EGFRCR SERPLBLD CKD-EPI 2021: 98 ML/MIN/1.73M2
GLUCOSE QUALITATIVE U: NEGATIVE MG/DL
GLUCOSE SERPL-MCNC: 104 MG/DL
KETONES URINE: ABNORMAL MG/DL
LEUKOCYTE ESTERASE URINE: ABNORMAL
NITRITE URINE: NEGATIVE
PH URINE: 6
POTASSIUM SERPL-SCNC: 3.9 MMOL/L
PROT SERPL-MCNC: 7.1 G/DL
PROTEIN URINE: NORMAL MG/DL
SODIUM SERPL-SCNC: 141 MMOL/L
SPECIFIC GRAVITY URINE: 1.02
UROBILINOGEN URINE: 0.2 MG/DL

## 2025-03-07 LAB — CEA SERPL-MCNC: 84.5 NG/ML

## 2025-03-12 ENCOUNTER — OUTPATIENT (OUTPATIENT)
Dept: OUTPATIENT SERVICES | Facility: HOSPITAL | Age: 65
LOS: 1 days | Discharge: HOME | End: 2025-03-12
Payer: MEDICARE

## 2025-03-12 ENCOUNTER — RESULT REVIEW (OUTPATIENT)
Age: 65
End: 2025-03-12

## 2025-03-12 DIAGNOSIS — T82.528A DISPLACEMENT OF OTHER CARDIAC AND VASCULAR DEVICES AND IMPLANTS, INITIAL ENCOUNTER: ICD-10-CM

## 2025-03-12 DIAGNOSIS — C18.9 MALIGNANT NEOPLASM OF COLON, UNSPECIFIED: ICD-10-CM

## 2025-03-12 PROCEDURE — C1769: CPT

## 2025-03-12 PROCEDURE — C1751: CPT

## 2025-03-12 PROCEDURE — 36580 REPLACE CVAD CATH: CPT

## 2025-03-12 PROCEDURE — 77001 FLUOROGUIDE FOR VEIN DEVICE: CPT

## 2025-03-12 PROCEDURE — 77001 FLUOROGUIDE FOR VEIN DEVICE: CPT | Mod: 26

## 2025-03-12 NOTE — H&P ADULT - HISTORY OF PRESENT ILLNESS
Patient is a 64-year-old male with a past medical history of stage IV colon cancer with mets to the liver, hypertension, hyperlipidemia, and diabetes presents   Patient denies headache, dizziness, visual changes, recent head trauma, use of blood thinners, neck pain, back pain, chest pain, shortness of breath, abdominal pain, nausea, vomiting, diarrhea, constipation, urinary or bowel retention or incontinence, or weakness. Patient is a 64-year-old male with a past medical history of stage IV colon cancer with mets to the liver, hypertension, hyperlipidemia, and diabetes presents for an ultrasound guided PICC exchange/ replacement   Patient denies headache, dizziness, visual changes, recent head trauma, use of blood thinners, neck pain, back pain, chest pain, shortness of breath, abdominal pain, nausea, vomiting, diarrhea, constipation, urinary or bowel retention or incontinence, or weakness.

## 2025-03-12 NOTE — PROCEDURE NOTE - NSPOSTPRCRAD_GEN_A_CORE
chest radiograph/depth of insertion/fluoroscopy/line adjusted to depth of insertion/line in appropriate postion/postion of catheter

## 2025-03-12 NOTE — PROCEDURE NOTE - ADDITIONAL PROCEDURE DETAILS
Original catheter was retracted and coiled with tip near the left shoulder area, with questionable scarring of the veins.  Guidewire exchange was performed with a stiff-shaft angled Glidewire to place a 5-Fr, 46 cm single lumen PICC with tip in the SVC at the caval-atrial junction:  ready to use.

## 2025-03-12 NOTE — H&P ADULT - ASSESSMENT
IMPRESSION:  #Hypoglycemia   #Facial weakness and leg weakness resolved   #Colon cancer Stage IV with mets to liver, peritoneum and lungs   #HTN   #DM      PLAN:    CNS: avoid sedatives, CT head neg, Weakness resolved, Consult neuro if changes recur.     HEENT: Oral care    PULMONARY:  HOB @ 45 degrees.  Aspiration precautions, pt is on RA    CARDIOVASCULAR: Keep MAP~65, avoid overload.     GI: GI prophylaxis N/A. Diet - reg,  Bowel regimen if needed.     RENAL: F/U urine drug screen, Follow up lytes.  Correct as needed    INFECTIOUS DISEASE: Monitor for signs of infection, No infectious work up needed for now.    HEMATOLOGICAL:  trend CBC for Hb and WBC count, lovenox for DVT prophylaxis,     ENDOCRINE: C/W D10 drip, Q1 FS until hypoglycemia is resolved, F/U hypoglycemia screen, F/U C-peptide, pro insulin levels.     MUSCULOSKELETAL: IAT, Offloading.        PLAN:  - Patient is a 64 year old male who presents for an ultrasound guided PICC exchange and or possible replacement. Patient is aware of the procedure including pre and post op instructions. Patients family member at the bedside, understood the plan of care. All questions and concerns addressed. Consent placed in the chart.

## 2025-03-18 ENCOUNTER — APPOINTMENT (OUTPATIENT)
Age: 65
End: 2025-03-18
Payer: MEDICARE

## 2025-03-18 ENCOUNTER — OUTPATIENT (OUTPATIENT)
Dept: OUTPATIENT SERVICES | Facility: HOSPITAL | Age: 65
LOS: 1 days | End: 2025-03-18
Payer: MEDICARE

## 2025-03-18 VITALS
RESPIRATION RATE: 16 BRPM | TEMPERATURE: 98.3 F | OXYGEN SATURATION: 100 % | DIASTOLIC BLOOD PRESSURE: 88 MMHG | HEART RATE: 104 BPM | SYSTOLIC BLOOD PRESSURE: 158 MMHG | WEIGHT: 240 LBS | HEIGHT: 72 IN | BODY MASS INDEX: 32.51 KG/M2

## 2025-03-18 DIAGNOSIS — C18.9 MALIGNANT NEOPLASM OF COLON, UNSPECIFIED: ICD-10-CM

## 2025-03-18 DIAGNOSIS — Z51.11 ENCOUNTER FOR ANTINEOPLASTIC CHEMOTHERAPY: ICD-10-CM

## 2025-03-18 LAB
ALBUMIN SERPL ELPH-MCNC: 4.4 G/DL
ALP BLD-CCNC: 84 U/L
ALT SERPL-CCNC: 12 U/L
ANION GAP SERPL CALC-SCNC: 13 MMOL/L
AST SERPL-CCNC: 22 U/L
AUTO BASOPHILS #: 0.01 K/UL
AUTO BASOPHILS %: 0.2 %
AUTO EOSINOPHILS #: 0.14 K/UL
AUTO EOSINOPHILS %: 3.3 %
AUTO IMMATURE GRANULOCYTES #: 0.01 K/UL
AUTO LYMPHOCYTES #: 1.3 K/UL
AUTO LYMPHOCYTES %: 31 %
AUTO MONOCYTES #: 0.64 K/UL
AUTO MONOCYTES %: 15.3 %
AUTO NEUTROPHILS #: 2.09 K/UL
AUTO NEUTROPHILS %: 50 %
AUTO NRBC #: 0 K/UL
BILIRUB SERPL-MCNC: 0.3 MG/DL
BUN SERPL-MCNC: 9 MG/DL
CALCIUM SERPL-MCNC: 9 MG/DL
CHLORIDE SERPL-SCNC: 102 MMOL/L
CO2 SERPL-SCNC: 26 MMOL/L
CREAT SERPL-MCNC: 0.7 MG/DL
EGFRCR SERPLBLD CKD-EPI 2021: 102 ML/MIN/1.73M2
GLUCOSE SERPL-MCNC: 129 MG/DL
HCT VFR BLD CALC: 28.4 %
HGB BLD-MCNC: 9.7 G/DL
IMM GRANULOCYTES NFR BLD AUTO: 0.2 %
MAN DIFF?: NORMAL
MCHC RBC-ENTMCNC: 31.3 PG
MCHC RBC-ENTMCNC: 34.2 G/DL
MCV RBC AUTO: 91.6 FL
PLATELET # BLD AUTO: 235 K/UL
PMV BLD AUTO: 0 /100 WBCS
PMV BLD: 9.5 FL
POTASSIUM SERPL-SCNC: 3.9 MMOL/L
PROT SERPL-MCNC: 7.5 G/DL
RBC # BLD: 3.1 M/UL
RBC # FLD: 17.5 %
SODIUM SERPL-SCNC: 141 MMOL/L
WBC # FLD AUTO: 4.19 K/UL

## 2025-03-18 PROCEDURE — 99214 OFFICE O/P EST MOD 30 MIN: CPT

## 2025-03-18 PROCEDURE — 81003 URINALYSIS AUTO W/O SCOPE: CPT

## 2025-03-18 PROCEDURE — G2211 COMPLEX E/M VISIT ADD ON: CPT

## 2025-03-18 PROCEDURE — 82378 CARCINOEMBRYONIC ANTIGEN: CPT

## 2025-03-18 PROCEDURE — 85025 COMPLETE CBC W/AUTO DIFF WBC: CPT

## 2025-03-18 PROCEDURE — 96375 TX/PRO/DX INJ NEW DRUG ADDON: CPT

## 2025-03-18 PROCEDURE — 96413 CHEMO IV INFUSION 1 HR: CPT

## 2025-03-18 PROCEDURE — 96417 CHEMO IV INFUS EACH ADDL SEQ: CPT

## 2025-03-18 PROCEDURE — 96367 TX/PROPH/DG ADDL SEQ IV INF: CPT

## 2025-03-18 PROCEDURE — 80053 COMPREHEN METABOLIC PANEL: CPT

## 2025-03-18 RX ORDER — DEXAMETHASONE 0.5 MG/1
12 TABLET ORAL ONCE
Refills: 0 | Status: COMPLETED | OUTPATIENT
Start: 2025-03-18 | End: 2025-03-18

## 2025-03-18 RX ORDER — BEVACIZUMAB-MALY 400 MG/16ML
540 INJECTION, SOLUTION INTRAVENOUS ONCE
Refills: 0 | Status: COMPLETED | OUTPATIENT
Start: 2025-03-18 | End: 2025-03-18

## 2025-03-18 RX ORDER — PALONOSETRON HYDROCHLORIDE 0.05 MG/ML
0.25 INJECTION, SOLUTION INTRAVENOUS ONCE
Refills: 0 | Status: COMPLETED | OUTPATIENT
Start: 2025-03-18 | End: 2025-03-18

## 2025-03-18 RX ORDER — IRINOTECAN HYDROCHLORIDE 20 MG/ML
410 INJECTION, SOLUTION INTRAVENOUS ONCE
Refills: 0 | Status: COMPLETED | OUTPATIENT
Start: 2025-03-18 | End: 2025-03-18

## 2025-03-18 RX ORDER — FOSAPREPITANT 150 MG/5ML
150 INJECTION, POWDER, LYOPHILIZED, FOR SOLUTION INTRAVENOUS ONCE
Refills: 0 | Status: COMPLETED | OUTPATIENT
Start: 2025-03-18 | End: 2025-03-18

## 2025-03-18 RX ADMIN — PALONOSETRON HYDROCHLORIDE 0.25 MILLIGRAM(S): 0.05 INJECTION, SOLUTION INTRAVENOUS at 12:41

## 2025-03-18 RX ADMIN — BEVACIZUMAB-MALY 540 MILLIGRAM(S): 400 INJECTION, SOLUTION INTRAVENOUS at 13:27

## 2025-03-18 RX ADMIN — DEXAMETHASONE 100 MILLIGRAM(S): 0.5 TABLET ORAL at 12:41

## 2025-03-18 RX ADMIN — IRINOTECAN HYDROCHLORIDE 410 MILLIGRAM(S): 20 INJECTION, SOLUTION INTRAVENOUS at 13:27

## 2025-03-18 RX ADMIN — Medication 0.5 MILLIGRAM(S): at 12:41

## 2025-03-18 RX ADMIN — FOSAPREPITANT 500 MILLIGRAM(S): 150 INJECTION, POWDER, LYOPHILIZED, FOR SOLUTION INTRAVENOUS at 12:40

## 2025-03-19 LAB
APPEARANCE: CLEAR
BILIRUBIN URINE: NEGATIVE
BLOOD URINE: NEGATIVE
CEA SERPL-MCNC: 80.7 NG/ML
COLOR: YELLOW
GLUCOSE QUALITATIVE U: NEGATIVE MG/DL
KETONES URINE: NEGATIVE MG/DL
LEUKOCYTE ESTERASE URINE: NEGATIVE
NITRITE URINE: NEGATIVE
PH URINE: 6
PROTEIN URINE: NEGATIVE MG/DL
SPECIFIC GRAVITY URINE: 1.01
UROBILINOGEN URINE: 0.2 MG/DL

## 2025-04-01 ENCOUNTER — APPOINTMENT (OUTPATIENT)
Age: 65
End: 2025-04-01
Payer: MEDICARE

## 2025-04-01 ENCOUNTER — OUTPATIENT (OUTPATIENT)
Dept: OUTPATIENT SERVICES | Facility: HOSPITAL | Age: 65
LOS: 1 days | End: 2025-04-01
Payer: MEDICARE

## 2025-04-01 ENCOUNTER — LABORATORY RESULT (OUTPATIENT)
Age: 65
End: 2025-04-01

## 2025-04-01 VITALS
HEART RATE: 100 BPM | BODY MASS INDEX: 31.42 KG/M2 | OXYGEN SATURATION: 100 % | WEIGHT: 232 LBS | TEMPERATURE: 98.7 F | SYSTOLIC BLOOD PRESSURE: 160 MMHG | RESPIRATION RATE: 16 BRPM | HEIGHT: 72 IN | DIASTOLIC BLOOD PRESSURE: 86 MMHG

## 2025-04-01 VITALS — WEIGHT: 233.91 LBS | HEIGHT: 72 IN | BODY MASS INDEX: 31.68 KG/M2

## 2025-04-01 VITALS — SYSTOLIC BLOOD PRESSURE: 160 MMHG | HEART RATE: 100 BPM | TEMPERATURE: 99 F | DIASTOLIC BLOOD PRESSURE: 86 MMHG

## 2025-04-01 DIAGNOSIS — Z51.11 ENCOUNTER FOR ANTINEOPLASTIC CHEMOTHERAPY: ICD-10-CM

## 2025-04-01 DIAGNOSIS — C18.9 MALIGNANT NEOPLASM OF COLON, UNSPECIFIED: ICD-10-CM

## 2025-04-01 LAB
ALBUMIN SERPL ELPH-MCNC: 4.4 G/DL
ALP BLD-CCNC: 74 U/L
ALT SERPL-CCNC: 11 U/L
ANION GAP SERPL CALC-SCNC: 13 MMOL/L
AST SERPL-CCNC: 24 U/L
AUTO BASOPHILS #: 0.04 K/UL
AUTO BASOPHILS %: 0.7 %
AUTO EOSINOPHILS #: 0.05 K/UL
AUTO EOSINOPHILS %: 0.8 %
AUTO IMMATURE GRANULOCYTES #: 0.02 K/UL
AUTO LYMPHOCYTES #: 1.49 K/UL
AUTO LYMPHOCYTES %: 24.5 %
AUTO MONOCYTES #: 0.96 K/UL
AUTO MONOCYTES %: 15.8 %
AUTO NEUTROPHILS #: 3.52 K/UL
AUTO NEUTROPHILS %: 57.9 %
AUTO NRBC #: 0 K/UL
BILIRUB SERPL-MCNC: 0.3 MG/DL
BUN SERPL-MCNC: 8 MG/DL
CALCIUM SERPL-MCNC: 9.6 MG/DL
CHLORIDE SERPL-SCNC: 104 MMOL/L
CO2 SERPL-SCNC: 24 MMOL/L
CREAT SERPL-MCNC: 0.8 MG/DL
EGFRCR SERPLBLD CKD-EPI 2021: 98 ML/MIN/1.73M2
GLUCOSE SERPL-MCNC: 113 MG/DL
HCT VFR BLD CALC: 30.5 %
HGB BLD-MCNC: 10.6 G/DL
IMM GRANULOCYTES NFR BLD AUTO: 0.3 %
MAN DIFF?: NORMAL
MCHC RBC-ENTMCNC: 31.2 PG
MCHC RBC-ENTMCNC: 34.8 G/DL
MCV RBC AUTO: 89.7 FL
PLATELET # BLD AUTO: 278 K/UL
PMV BLD AUTO: 0 /100 WBCS
PMV BLD: 10.6 FL
POTASSIUM SERPL-SCNC: 4.1 MMOL/L
PROT SERPL-MCNC: 7.8 G/DL
RBC # BLD: 3.4 M/UL
RBC # FLD: 17.7 %
SODIUM SERPL-SCNC: 141 MMOL/L
WBC # FLD AUTO: 6.08 K/UL

## 2025-04-01 PROCEDURE — 80053 COMPREHEN METABOLIC PANEL: CPT

## 2025-04-01 PROCEDURE — 82378 CARCINOEMBRYONIC ANTIGEN: CPT

## 2025-04-01 PROCEDURE — 81001 URINALYSIS AUTO W/SCOPE: CPT

## 2025-04-01 PROCEDURE — 96415 CHEMO IV INFUSION ADDL HR: CPT

## 2025-04-01 PROCEDURE — 96367 TX/PROPH/DG ADDL SEQ IV INF: CPT

## 2025-04-01 PROCEDURE — 99214 OFFICE O/P EST MOD 30 MIN: CPT

## 2025-04-01 PROCEDURE — 36415 COLL VENOUS BLD VENIPUNCTURE: CPT

## 2025-04-01 PROCEDURE — 96413 CHEMO IV INFUSION 1 HR: CPT

## 2025-04-01 PROCEDURE — 96417 CHEMO IV INFUS EACH ADDL SEQ: CPT

## 2025-04-01 PROCEDURE — 85025 COMPLETE CBC W/AUTO DIFF WBC: CPT

## 2025-04-01 PROCEDURE — 96375 TX/PRO/DX INJ NEW DRUG ADDON: CPT

## 2025-04-01 RX ORDER — DEXAMETHASONE 0.5 MG/1
12 TABLET ORAL ONCE
Refills: 0 | Status: COMPLETED | OUTPATIENT
Start: 2025-04-01 | End: 2025-04-01

## 2025-04-01 RX ORDER — IRINOTECAN HYDROCHLORIDE 20 MG/ML
343 INJECTION, SOLUTION INTRAVENOUS ONCE
Refills: 0 | Status: COMPLETED | OUTPATIENT
Start: 2025-04-01 | End: 2025-04-01

## 2025-04-01 RX ORDER — PALONOSETRON HYDROCHLORIDE 0.05 MG/ML
0.25 INJECTION, SOLUTION INTRAVENOUS ONCE
Refills: 0 | Status: COMPLETED | OUTPATIENT
Start: 2025-04-01 | End: 2025-04-01

## 2025-04-01 RX ORDER — FOSAPREPITANT 150 MG/5ML
150 INJECTION, POWDER, LYOPHILIZED, FOR SOLUTION INTRAVENOUS ONCE
Refills: 0 | Status: COMPLETED | OUTPATIENT
Start: 2025-04-01 | End: 2025-04-01

## 2025-04-01 RX ORDER — BEVACIZUMAB-MALY 400 MG/16ML
540 INJECTION, SOLUTION INTRAVENOUS ONCE
Refills: 0 | Status: COMPLETED | OUTPATIENT
Start: 2025-04-01 | End: 2025-04-01

## 2025-04-01 RX ADMIN — BEVACIZUMAB-MALY 540 MILLIGRAM(S): 400 INJECTION, SOLUTION INTRAVENOUS at 14:12

## 2025-04-01 RX ADMIN — DEXAMETHASONE 12 MILLIGRAM(S): 0.5 TABLET ORAL at 14:35

## 2025-04-01 RX ADMIN — FOSAPREPITANT 500 MILLIGRAM(S): 150 INJECTION, POWDER, LYOPHILIZED, FOR SOLUTION INTRAVENOUS at 14:35

## 2025-04-01 RX ADMIN — PALONOSETRON HYDROCHLORIDE 0.25 MILLIGRAM(S): 0.05 INJECTION, SOLUTION INTRAVENOUS at 14:10

## 2025-04-01 RX ADMIN — DEXAMETHASONE 100 MILLIGRAM(S): 0.5 TABLET ORAL at 14:20

## 2025-04-01 RX ADMIN — IRINOTECAN HYDROCHLORIDE 343 MILLIGRAM(S): 20 INJECTION, SOLUTION INTRAVENOUS at 14:11

## 2025-04-01 RX ADMIN — Medication 0.5 MILLIGRAM(S): at 14:09

## 2025-04-02 DIAGNOSIS — C18.9 MALIGNANT NEOPLASM OF COLON, UNSPECIFIED: ICD-10-CM

## 2025-04-02 LAB
APPEARANCE: CLEAR
BILIRUBIN URINE: NEGATIVE
BLOOD URINE: NEGATIVE
CEA SERPL-MCNC: 84.7 NG/ML
COLOR: YELLOW
GLUCOSE QUALITATIVE U: NEGATIVE MG/DL
KETONES URINE: ABNORMAL MG/DL
LEUKOCYTE ESTERASE URINE: ABNORMAL
NITRITE URINE: NEGATIVE
PH URINE: 6
PROTEIN URINE: NORMAL MG/DL
SPECIFIC GRAVITY URINE: 1.02
UROBILINOGEN URINE: 1 MG/DL

## 2025-04-04 ENCOUNTER — NON-APPOINTMENT (OUTPATIENT)
Age: 65
End: 2025-04-04

## 2025-04-15 ENCOUNTER — APPOINTMENT (OUTPATIENT)
Age: 65
End: 2025-04-15
Payer: MEDICARE

## 2025-04-15 ENCOUNTER — LABORATORY RESULT (OUTPATIENT)
Age: 65
End: 2025-04-15

## 2025-04-15 ENCOUNTER — OUTPATIENT (OUTPATIENT)
Dept: OUTPATIENT SERVICES | Facility: HOSPITAL | Age: 65
LOS: 1 days | End: 2025-04-15
Payer: MEDICARE

## 2025-04-15 VITALS
RESPIRATION RATE: 16 BRPM | HEART RATE: 90 BPM | WEIGHT: 230 LBS | DIASTOLIC BLOOD PRESSURE: 88 MMHG | BODY MASS INDEX: 31.15 KG/M2 | OXYGEN SATURATION: 98 % | HEIGHT: 72 IN | TEMPERATURE: 98.6 F | SYSTOLIC BLOOD PRESSURE: 153 MMHG

## 2025-04-15 DIAGNOSIS — Z51.11 ENCOUNTER FOR ANTINEOPLASTIC CHEMOTHERAPY: ICD-10-CM

## 2025-04-15 DIAGNOSIS — C18.9 MALIGNANT NEOPLASM OF COLON, UNSPECIFIED: ICD-10-CM

## 2025-04-15 PROCEDURE — G2211 COMPLEX E/M VISIT ADD ON: CPT

## 2025-04-15 PROCEDURE — 99214 OFFICE O/P EST MOD 30 MIN: CPT

## 2025-04-15 PROCEDURE — 80053 COMPREHEN METABOLIC PANEL: CPT

## 2025-04-15 PROCEDURE — 85025 COMPLETE CBC W/AUTO DIFF WBC: CPT

## 2025-04-15 PROCEDURE — 81001 URINALYSIS AUTO W/SCOPE: CPT

## 2025-04-15 PROCEDURE — 82378 CARCINOEMBRYONIC ANTIGEN: CPT

## 2025-04-16 LAB
ALBUMIN SERPL ELPH-MCNC: 4.2 G/DL
ALP BLD-CCNC: 71 U/L
ALT SERPL-CCNC: 13 U/L
ANION GAP SERPL CALC-SCNC: 11 MMOL/L
APPEARANCE: CLEAR
AST SERPL-CCNC: 23 U/L
AUTO BASOPHILS #: 0.03 K/UL
AUTO BASOPHILS %: 0.5 %
AUTO EOSINOPHILS #: 0.13 K/UL
AUTO EOSINOPHILS %: 2.3 %
AUTO IMMATURE GRANULOCYTES #: 0.01 K/UL
AUTO LYMPHOCYTES #: 1.35 K/UL
AUTO LYMPHOCYTES %: 23.9 %
AUTO MONOCYTES #: 0.75 K/UL
AUTO MONOCYTES %: 13.3 %
AUTO NEUTROPHILS #: 3.39 K/UL
AUTO NEUTROPHILS %: 59.8 %
AUTO NRBC #: 0 K/UL
BILIRUB SERPL-MCNC: 0.2 MG/DL
BILIRUBIN URINE: NEGATIVE
BLOOD URINE: NEGATIVE
BUN SERPL-MCNC: 15 MG/DL
CALCIUM SERPL-MCNC: 9 MG/DL
CEA SERPL-MCNC: 85.1 NG/ML
CHLORIDE SERPL-SCNC: 102 MMOL/L
CO2 SERPL-SCNC: 25 MMOL/L
COLOR: YELLOW
CREAT SERPL-MCNC: 0.9 MG/DL
EGFRCR SERPLBLD CKD-EPI 2021: 95 ML/MIN/1.73M2
GLUCOSE QUALITATIVE U: NEGATIVE MG/DL
GLUCOSE SERPL-MCNC: 158 MG/DL
HCT VFR BLD CALC: 28.6 %
HGB BLD-MCNC: 9.8 G/DL
IMM GRANULOCYTES NFR BLD AUTO: 0.2 %
KETONES URINE: NEGATIVE MG/DL
LEUKOCYTE ESTERASE URINE: ABNORMAL
MAN DIFF?: NORMAL
MCHC RBC-ENTMCNC: 31.6 PG
MCHC RBC-ENTMCNC: 34.3 G/DL
MCV RBC AUTO: 92.3 FL
NITRITE URINE: NEGATIVE
PH URINE: 5.5
PLATELET # BLD AUTO: 195 K/UL
PMV BLD AUTO: 0 /100 WBCS
PMV BLD: 11 FL
POTASSIUM SERPL-SCNC: 3.7 MMOL/L
PROT SERPL-MCNC: 7.4 G/DL
PROTEIN URINE: 30 MG/DL
RBC # BLD: 3.1 M/UL
RBC # FLD: 17.7 %
SODIUM SERPL-SCNC: 138 MMOL/L
SPECIFIC GRAVITY URINE: 1.02
UROBILINOGEN URINE: 0.2 MG/DL
WBC # FLD AUTO: 5.66 K/UL

## 2025-04-17 ENCOUNTER — OUTPATIENT (OUTPATIENT)
Dept: OUTPATIENT SERVICES | Facility: HOSPITAL | Age: 65
LOS: 1 days | End: 2025-04-17
Payer: MEDICARE

## 2025-04-17 ENCOUNTER — RESULT REVIEW (OUTPATIENT)
Age: 65
End: 2025-04-17

## 2025-04-17 DIAGNOSIS — C18.9 MALIGNANT NEOPLASM OF COLON, UNSPECIFIED: ICD-10-CM

## 2025-04-17 LAB — GLUCOSE BLDC GLUCOMTR-MCNC: 112 MG/DL — HIGH (ref 70–99)

## 2025-04-17 PROCEDURE — 78815 PET IMAGE W/CT SKULL-THIGH: CPT | Mod: PS

## 2025-04-17 PROCEDURE — A9552: CPT

## 2025-04-17 PROCEDURE — 78815 PET IMAGE W/CT SKULL-THIGH: CPT | Mod: 26,PS

## 2025-04-17 PROCEDURE — 82962 GLUCOSE BLOOD TEST: CPT

## 2025-04-18 DIAGNOSIS — C18.9 MALIGNANT NEOPLASM OF COLON, UNSPECIFIED: ICD-10-CM

## 2025-04-21 RX ORDER — TRIFLURIDINE AND TIPIRACIL 20; 8.19 MG/1; MG/1
20-8.19 TABLET, FILM COATED ORAL
Qty: 40 | Refills: 0 | Status: ACTIVE | COMMUNITY
Start: 2025-04-21 | End: 1900-01-01

## 2025-04-21 RX ORDER — TRIFLURIDINE AND TIPIRACIL 15; 6.14 MG/1; MG/1
15-6.14 TABLET, FILM COATED ORAL
Qty: 1 | Refills: 0 | Status: ACTIVE | COMMUNITY
Start: 2025-04-21 | End: 1900-01-01

## 2025-04-22 ENCOUNTER — APPOINTMENT (OUTPATIENT)
Age: 65
End: 2025-04-22

## 2025-04-22 ENCOUNTER — LABORATORY RESULT (OUTPATIENT)
Age: 65
End: 2025-04-22

## 2025-04-22 ENCOUNTER — NON-APPOINTMENT (OUTPATIENT)
Age: 65
End: 2025-04-22

## 2025-04-22 ENCOUNTER — OUTPATIENT (OUTPATIENT)
Dept: OUTPATIENT SERVICES | Facility: HOSPITAL | Age: 65
LOS: 1 days | End: 2025-04-22
Payer: MEDICARE

## 2025-04-22 VITALS
DIASTOLIC BLOOD PRESSURE: 82 MMHG | SYSTOLIC BLOOD PRESSURE: 158 MMHG | WEIGHT: 225 LBS | RESPIRATION RATE: 16 BRPM | OXYGEN SATURATION: 100 % | TEMPERATURE: 97.9 F | HEART RATE: 87 BPM | HEIGHT: 72 IN | BODY MASS INDEX: 30.48 KG/M2

## 2025-04-22 VITALS — DIASTOLIC BLOOD PRESSURE: 82 MMHG | HEART RATE: 87 BPM | SYSTOLIC BLOOD PRESSURE: 158 MMHG | TEMPERATURE: 98 F

## 2025-04-22 DIAGNOSIS — C18.9 MALIGNANT NEOPLASM OF COLON, UNSPECIFIED: ICD-10-CM

## 2025-04-22 DIAGNOSIS — Z51.11 ENCOUNTER FOR ANTINEOPLASTIC CHEMOTHERAPY: ICD-10-CM

## 2025-04-22 LAB
ALBUMIN SERPL ELPH-MCNC: 4.2 G/DL
ALP BLD-CCNC: 67 U/L
ALT SERPL-CCNC: 14 U/L
ANION GAP SERPL CALC-SCNC: 13 MMOL/L
AST SERPL-CCNC: 25 U/L
AUTO BASOPHILS #: 0.02 K/UL
AUTO BASOPHILS %: 0.4 %
AUTO EOSINOPHILS #: 0.08 K/UL
AUTO EOSINOPHILS %: 1.8 %
AUTO IMMATURE GRANULOCYTES #: 0.01 K/UL
AUTO LYMPHOCYTES #: 1.08 K/UL
AUTO LYMPHOCYTES %: 23.6 %
AUTO MONOCYTES #: 0.68 K/UL
AUTO MONOCYTES %: 14.9 %
AUTO NEUTROPHILS #: 2.7 K/UL
AUTO NEUTROPHILS %: 59.1 %
AUTO NRBC #: 0 K/UL
BILIRUB SERPL-MCNC: 0.3 MG/DL
BUN SERPL-MCNC: 14 MG/DL
CALCIUM SERPL-MCNC: 9.5 MG/DL
CHLORIDE SERPL-SCNC: 103 MMOL/L
CO2 SERPL-SCNC: 26 MMOL/L
CREAT SERPL-MCNC: 1 MG/DL
EGFRCR SERPLBLD CKD-EPI 2021: 84 ML/MIN/1.73M2
GLUCOSE SERPL-MCNC: 115 MG/DL
HCT VFR BLD CALC: 30.4 %
HGB BLD-MCNC: 10.4 G/DL
IMM GRANULOCYTES NFR BLD AUTO: 0.2 %
MAN DIFF?: NORMAL
MCHC RBC-ENTMCNC: 31.6 PG
MCHC RBC-ENTMCNC: 34.2 G/DL
MCV RBC AUTO: 92.4 FL
PLATELET # BLD AUTO: 197 K/UL
PMV BLD AUTO: 0 /100 WBCS
PMV BLD: 10.5 FL
POTASSIUM SERPL-SCNC: 3.6 MMOL/L
PROT SERPL-MCNC: 7.5 G/DL
RBC # BLD: 3.29 M/UL
RBC # FLD: 17.2 %
SODIUM SERPL-SCNC: 142 MMOL/L
WBC # FLD AUTO: 4.57 K/UL

## 2025-04-22 PROCEDURE — 80053 COMPREHEN METABOLIC PANEL: CPT

## 2025-04-22 PROCEDURE — G2211 COMPLEX E/M VISIT ADD ON: CPT

## 2025-04-22 PROCEDURE — 96413 CHEMO IV INFUSION 1 HR: CPT

## 2025-04-22 PROCEDURE — 99214 OFFICE O/P EST MOD 30 MIN: CPT

## 2025-04-22 PROCEDURE — 85025 COMPLETE CBC W/AUTO DIFF WBC: CPT

## 2025-04-22 PROCEDURE — 81001 URINALYSIS AUTO W/SCOPE: CPT

## 2025-04-22 PROCEDURE — 82378 CARCINOEMBRYONIC ANTIGEN: CPT

## 2025-04-22 RX ORDER — SAW/PYGEUM/BETA/HERB/D3/B6/ZN 30 MG-25MG
200 CAPSULE ORAL
Refills: 0 | Status: ACTIVE | COMMUNITY
Start: 2025-04-22

## 2025-04-22 RX ORDER — POTASSIUM CHLORIDE 1500 MG/1
20 TABLET, FILM COATED, EXTENDED RELEASE ORAL
Qty: 30 | Refills: 1 | Status: ACTIVE | COMMUNITY
Start: 2025-04-22

## 2025-04-22 RX ORDER — BEVACIZUMAB-MALY 400 MG/16ML
540 INJECTION, SOLUTION INTRAVENOUS ONCE
Refills: 0 | Status: COMPLETED | OUTPATIENT
Start: 2025-04-22 | End: 2025-04-22

## 2025-04-22 RX ORDER — IRON POLYSACCHARIDE COMPLEX 150 MG
150 CAPSULE ORAL DAILY
Refills: 0 | Status: ACTIVE | COMMUNITY
Start: 2025-04-22

## 2025-04-22 RX ORDER — FUROSEMIDE 20 MG/1
20 TABLET ORAL DAILY
Qty: 30 | Refills: 0 | Status: ACTIVE | COMMUNITY
Start: 2025-04-22

## 2025-04-22 RX ADMIN — BEVACIZUMAB-MALY 540 MILLIGRAM(S): 400 INJECTION, SOLUTION INTRAVENOUS at 14:00

## 2025-04-22 RX ADMIN — BEVACIZUMAB-MALY 540 MILLIGRAM(S): 400 INJECTION, SOLUTION INTRAVENOUS at 13:26

## 2025-04-23 LAB
APPEARANCE: CLEAR
BILIRUBIN URINE: NEGATIVE
BLOOD URINE: NEGATIVE
CEA SERPL-MCNC: 84.6 NG/ML
COLOR: YELLOW
GLUCOSE QUALITATIVE U: NEGATIVE MG/DL
KETONES URINE: NEGATIVE MG/DL
LEUKOCYTE ESTERASE URINE: NEGATIVE
NITRITE URINE: NEGATIVE
PH URINE: 5.5
PROTEIN URINE: 30 MG/DL
SPECIFIC GRAVITY URINE: 1.02
UROBILINOGEN URINE: 0.2 MG/DL

## 2025-05-06 ENCOUNTER — APPOINTMENT (OUTPATIENT)
Age: 65
End: 2025-05-06
Payer: MEDICARE

## 2025-05-06 ENCOUNTER — OUTPATIENT (OUTPATIENT)
Dept: OUTPATIENT SERVICES | Facility: HOSPITAL | Age: 65
LOS: 1 days | End: 2025-05-06
Payer: MEDICARE

## 2025-05-06 VITALS
SYSTOLIC BLOOD PRESSURE: 147 MMHG | HEIGHT: 72 IN | WEIGHT: 223 LBS | BODY MASS INDEX: 30.2 KG/M2 | HEART RATE: 99 BPM | OXYGEN SATURATION: 98 % | RESPIRATION RATE: 16 BRPM | TEMPERATURE: 97.8 F | DIASTOLIC BLOOD PRESSURE: 84 MMHG

## 2025-05-06 VITALS
SYSTOLIC BLOOD PRESSURE: 147 MMHG | TEMPERATURE: 98 F | HEART RATE: 99 BPM | DIASTOLIC BLOOD PRESSURE: 84 MMHG | RESPIRATION RATE: 16 BRPM

## 2025-05-06 DIAGNOSIS — C18.9 MALIGNANT NEOPLASM OF COLON, UNSPECIFIED: ICD-10-CM

## 2025-05-06 LAB
AUTO BASOPHILS #: 0.01 K/UL
AUTO BASOPHILS %: 0.2 %
AUTO EOSINOPHILS #: 0.04 K/UL
AUTO EOSINOPHILS %: 0.9 %
AUTO IMMATURE GRANULOCYTES #: 0.01 K/UL
AUTO LYMPHOCYTES #: 1.42 K/UL
AUTO LYMPHOCYTES %: 33.3 %
AUTO MONOCYTES #: 0.28 K/UL
AUTO MONOCYTES %: 6.6 %
AUTO NEUTROPHILS #: 2.5 K/UL
AUTO NEUTROPHILS %: 58.8 %
AUTO NRBC #: 0 K/UL
HCT VFR BLD CALC: 30.5 %
HGB BLD-MCNC: 10.4 G/DL
IMM GRANULOCYTES NFR BLD AUTO: 0.2 %
MAN DIFF?: NORMAL
MCHC RBC-ENTMCNC: 31.2 PG
MCHC RBC-ENTMCNC: 34.1 G/DL
MCV RBC AUTO: 91.6 FL
PLATELET # BLD AUTO: 217 K/UL
PMV BLD AUTO: 0 /100 WBCS
PMV BLD: 9.9 FL
RBC # BLD: 3.33 M/UL
RBC # FLD: 16.4 %
WBC # FLD AUTO: 4.26 K/UL

## 2025-05-06 PROCEDURE — 36415 COLL VENOUS BLD VENIPUNCTURE: CPT

## 2025-05-06 PROCEDURE — 81003 URINALYSIS AUTO W/O SCOPE: CPT

## 2025-05-06 PROCEDURE — 80053 COMPREHEN METABOLIC PANEL: CPT

## 2025-05-06 PROCEDURE — 85025 COMPLETE CBC W/AUTO DIFF WBC: CPT

## 2025-05-06 PROCEDURE — 82378 CARCINOEMBRYONIC ANTIGEN: CPT

## 2025-05-06 PROCEDURE — 99214 OFFICE O/P EST MOD 30 MIN: CPT

## 2025-05-06 PROCEDURE — 96413 CHEMO IV INFUSION 1 HR: CPT

## 2025-05-06 RX ORDER — BEVACIZUMAB-MALY 400 MG/16ML
540 INJECTION, SOLUTION INTRAVENOUS ONCE
Refills: 0 | Status: COMPLETED | OUTPATIENT
Start: 2025-05-06 | End: 2025-05-06

## 2025-05-06 RX ADMIN — BEVACIZUMAB-MALY 540 MILLIGRAM(S): 400 INJECTION, SOLUTION INTRAVENOUS at 17:05

## 2025-05-06 RX ADMIN — BEVACIZUMAB-MALY 540 MILLIGRAM(S): 400 INJECTION, SOLUTION INTRAVENOUS at 16:33

## 2025-05-07 DIAGNOSIS — C18.9 MALIGNANT NEOPLASM OF COLON, UNSPECIFIED: ICD-10-CM

## 2025-05-08 LAB
ALBUMIN SERPL ELPH-MCNC: 4.6 G/DL
ALP BLD-CCNC: 73 U/L
ALT SERPL-CCNC: 12 U/L
ANION GAP SERPL CALC-SCNC: 14 MMOL/L
APPEARANCE: CLEAR
AST SERPL-CCNC: 25 U/L
BILIRUB SERPL-MCNC: 0.6 MG/DL
BILIRUBIN URINE: NEGATIVE
BLOOD URINE: NEGATIVE
BUN SERPL-MCNC: 11 MG/DL
CALCIUM SERPL-MCNC: 9.7 MG/DL
CEA SERPL-MCNC: 111 NG/ML
CHLORIDE SERPL-SCNC: 99 MMOL/L
CO2 SERPL-SCNC: 25 MMOL/L
COLOR: YELLOW
CREAT SERPL-MCNC: 0.9 MG/DL
EGFRCR SERPLBLD CKD-EPI 2021: 95 ML/MIN/1.73M2
GLUCOSE QUALITATIVE U: NEGATIVE MG/DL
GLUCOSE SERPL-MCNC: 96 MG/DL
KETONES URINE: NEGATIVE MG/DL
LEUKOCYTE ESTERASE URINE: NEGATIVE
NITRITE URINE: NEGATIVE
PH URINE: 5
POTASSIUM SERPL-SCNC: 3.7 MMOL/L
PROT SERPL-MCNC: 8 G/DL
PROTEIN URINE: NEGATIVE MG/DL
SODIUM SERPL-SCNC: 138 MMOL/L
SPECIFIC GRAVITY URINE: 1.01
UROBILINOGEN URINE: 0.2 MG/DL

## 2025-05-15 ENCOUNTER — NON-APPOINTMENT (OUTPATIENT)
Age: 65
End: 2025-05-15

## 2025-05-19 ENCOUNTER — APPOINTMENT (OUTPATIENT)
Dept: VASCULAR SURGERY | Facility: CLINIC | Age: 65
End: 2025-05-19

## 2025-05-20 ENCOUNTER — APPOINTMENT (OUTPATIENT)
Age: 65
End: 2025-05-20
Payer: MEDICARE

## 2025-05-20 ENCOUNTER — OUTPATIENT (OUTPATIENT)
Dept: OUTPATIENT SERVICES | Facility: HOSPITAL | Age: 65
LOS: 1 days | End: 2025-05-20
Payer: MEDICARE

## 2025-05-20 ENCOUNTER — LABORATORY RESULT (OUTPATIENT)
Age: 65
End: 2025-05-20

## 2025-05-20 VITALS
HEART RATE: 98 BPM | BODY MASS INDEX: 30.92 KG/M2 | TEMPERATURE: 98.6 F | SYSTOLIC BLOOD PRESSURE: 150 MMHG | DIASTOLIC BLOOD PRESSURE: 84 MMHG | WEIGHT: 228 LBS | OXYGEN SATURATION: 100 % | RESPIRATION RATE: 16 BRPM

## 2025-05-20 VITALS — DIASTOLIC BLOOD PRESSURE: 84 MMHG | HEART RATE: 98 BPM | TEMPERATURE: 99 F | SYSTOLIC BLOOD PRESSURE: 150 MMHG

## 2025-05-20 DIAGNOSIS — C18.9 MALIGNANT NEOPLASM OF COLON, UNSPECIFIED: ICD-10-CM

## 2025-05-20 DIAGNOSIS — Z51.11 ENCOUNTER FOR ANTINEOPLASTIC CHEMOTHERAPY: ICD-10-CM

## 2025-05-20 LAB
APPEARANCE: CLEAR
AUTO BASOPHILS #: 0.03 K/UL
AUTO BASOPHILS %: 0.7 %
AUTO EOSINOPHILS #: 0.08 K/UL
AUTO EOSINOPHILS %: 1.9 %
AUTO IMMATURE GRANULOCYTES #: 0.01 K/UL
AUTO LYMPHOCYTES #: 1.89 K/UL
AUTO LYMPHOCYTES %: 45.5 %
AUTO MONOCYTES #: 0.96 K/UL
AUTO MONOCYTES %: 23.1 %
AUTO NEUTROPHILS #: 1.18 K/UL
AUTO NEUTROPHILS %: 28.6 %
AUTO NRBC #: 0 K/UL
BILIRUBIN URINE: NEGATIVE
BLOOD URINE: NEGATIVE
COLOR: YELLOW
GLUCOSE QUALITATIVE U: NEGATIVE MG/DL
HCT VFR BLD CALC: 32.3 %
HGB BLD-MCNC: 10.8 G/DL
IMM GRANULOCYTES NFR BLD AUTO: 0.2 %
KETONES URINE: NEGATIVE MG/DL
LEUKOCYTE ESTERASE URINE: NEGATIVE
MAN DIFF?: NORMAL
MCHC RBC-ENTMCNC: 31.2 PG
MCHC RBC-ENTMCNC: 33.4 G/DL
MCV RBC AUTO: 93.4 FL
NITRITE URINE: NEGATIVE
PH URINE: 7
PLATELET # BLD AUTO: 239 K/UL
PMV BLD AUTO: 0 /100 WBCS
PMV BLD: 10.5 FL
PROTEIN URINE: 30 MG/DL
RBC # BLD: 3.46 M/UL
RBC # FLD: 17 %
SPECIFIC GRAVITY URINE: 1.01
UROBILINOGEN URINE: 0.2 MG/DL
WBC # FLD AUTO: 4.15 K/UL

## 2025-05-20 PROCEDURE — 80053 COMPREHEN METABOLIC PANEL: CPT

## 2025-05-20 PROCEDURE — 99214 OFFICE O/P EST MOD 30 MIN: CPT

## 2025-05-20 PROCEDURE — 85025 COMPLETE CBC W/AUTO DIFF WBC: CPT

## 2025-05-20 PROCEDURE — 81001 URINALYSIS AUTO W/SCOPE: CPT

## 2025-05-20 PROCEDURE — 96413 CHEMO IV INFUSION 1 HR: CPT

## 2025-05-20 PROCEDURE — 82378 CARCINOEMBRYONIC ANTIGEN: CPT

## 2025-05-20 RX ORDER — BEVACIZUMAB-MALY 400 MG/16ML
540 INJECTION, SOLUTION INTRAVENOUS ONCE
Refills: 0 | Status: COMPLETED | OUTPATIENT
Start: 2025-05-20 | End: 2025-05-20

## 2025-05-20 RX ADMIN — BEVACIZUMAB-MALY 540 MILLIGRAM(S): 400 INJECTION, SOLUTION INTRAVENOUS at 17:41

## 2025-05-20 RX ADMIN — BEVACIZUMAB-MALY 540 MILLIGRAM(S): 400 INJECTION, SOLUTION INTRAVENOUS at 17:11

## 2025-05-22 LAB
ALBUMIN SERPL ELPH-MCNC: 4.5 G/DL
ALP BLD-CCNC: 87 U/L
ALT SERPL-CCNC: 16 U/L
ANION GAP SERPL CALC-SCNC: 14 MMOL/L
AST SERPL-CCNC: 31 U/L
BILIRUB SERPL-MCNC: 0.4 MG/DL
BUN SERPL-MCNC: 11 MG/DL
CALCIUM SERPL-MCNC: 9.6 MG/DL
CEA SERPL-MCNC: 127 NG/ML
CHLORIDE SERPL-SCNC: 100 MMOL/L
CO2 SERPL-SCNC: 26 MMOL/L
CREAT SERPL-MCNC: 0.9 MG/DL
EGFRCR SERPLBLD CKD-EPI 2021: 95 ML/MIN/1.73M2
GLUCOSE SERPL-MCNC: 83 MG/DL
POTASSIUM SERPL-SCNC: 3.7 MMOL/L
PROT SERPL-MCNC: 8 G/DL
SODIUM SERPL-SCNC: 140 MMOL/L

## 2025-05-31 ENCOUNTER — NON-APPOINTMENT (OUTPATIENT)
Age: 65
End: 2025-05-31

## 2025-06-02 ENCOUNTER — APPOINTMENT (OUTPATIENT)
Dept: VASCULAR SURGERY | Facility: CLINIC | Age: 65
End: 2025-06-02

## 2025-06-03 ENCOUNTER — OUTPATIENT (OUTPATIENT)
Dept: OUTPATIENT SERVICES | Facility: HOSPITAL | Age: 65
LOS: 1 days | End: 2025-06-03
Payer: MEDICARE

## 2025-06-03 ENCOUNTER — APPOINTMENT (OUTPATIENT)
Age: 65
End: 2025-06-03

## 2025-06-03 ENCOUNTER — LABORATORY RESULT (OUTPATIENT)
Age: 65
End: 2025-06-03

## 2025-06-03 VITALS
SYSTOLIC BLOOD PRESSURE: 159 MMHG | OXYGEN SATURATION: 100 % | DIASTOLIC BLOOD PRESSURE: 87 MMHG | HEART RATE: 59 BPM | TEMPERATURE: 98 F

## 2025-06-03 DIAGNOSIS — C18.9 MALIGNANT NEOPLASM OF COLON, UNSPECIFIED: ICD-10-CM

## 2025-06-03 LAB
AUTO BASOPHILS #: 0.01 K/UL
AUTO BASOPHILS %: 0.4 %
AUTO EOSINOPHILS #: 0.01 K/UL
AUTO EOSINOPHILS %: 0.4 %
AUTO IMMATURE GRANULOCYTES #: 0.01 K/UL
AUTO LYMPHOCYTES #: 0.82 K/UL
AUTO LYMPHOCYTES %: 29.1 %
AUTO MONOCYTES #: 0.17 K/UL
AUTO MONOCYTES %: 6 %
AUTO NEUTROPHILS #: 1.8 K/UL
AUTO NEUTROPHILS %: 63.7 %
AUTO NRBC #: 0 K/UL
HCT VFR BLD CALC: 28.3 %
HGB BLD-MCNC: 9.6 G/DL
IMM GRANULOCYTES NFR BLD AUTO: 0.4 %
MAN DIFF?: NORMAL
MCHC RBC-ENTMCNC: 31.2 PG
MCHC RBC-ENTMCNC: 33.9 G/DL
MCV RBC AUTO: 91.9 FL
PLATELET # BLD AUTO: 167 K/UL
PMV BLD AUTO: 0 /100 WBCS
PMV BLD: 10.1 FL
RBC # BLD: 3.08 M/UL
RBC # FLD: 15.6 %
WBC # FLD AUTO: 2.82 K/UL

## 2025-06-03 PROCEDURE — 81001 URINALYSIS AUTO W/SCOPE: CPT

## 2025-06-03 PROCEDURE — 96413 CHEMO IV INFUSION 1 HR: CPT

## 2025-06-03 PROCEDURE — 82378 CARCINOEMBRYONIC ANTIGEN: CPT

## 2025-06-03 PROCEDURE — 80053 COMPREHEN METABOLIC PANEL: CPT

## 2025-06-03 PROCEDURE — 85025 COMPLETE CBC W/AUTO DIFF WBC: CPT

## 2025-06-03 RX ORDER — BEVACIZUMAB-MALY 400 MG/16ML
540 INJECTION, SOLUTION INTRAVENOUS ONCE
Refills: 0 | Status: COMPLETED | OUTPATIENT
Start: 2025-06-03 | End: 2025-06-03

## 2025-06-03 RX ADMIN — BEVACIZUMAB-MALY 540 MILLIGRAM(S): 400 INJECTION, SOLUTION INTRAVENOUS at 15:37

## 2025-06-04 DIAGNOSIS — C18.9 MALIGNANT NEOPLASM OF COLON, UNSPECIFIED: ICD-10-CM

## 2025-06-04 LAB
ALBUMIN SERPL ELPH-MCNC: 4.5 G/DL
ALP BLD-CCNC: 76 U/L
ALT SERPL-CCNC: 14 U/L
ANION GAP SERPL CALC-SCNC: 11 MMOL/L
APPEARANCE: CLEAR
AST SERPL-CCNC: 27 U/L
BILIRUB SERPL-MCNC: 0.5 MG/DL
BILIRUBIN URINE: NEGATIVE
BLOOD URINE: NEGATIVE
BUN SERPL-MCNC: 9 MG/DL
CALCIUM SERPL-MCNC: 9.5 MG/DL
CEA SERPL-MCNC: 121 NG/ML
CHLORIDE SERPL-SCNC: 101 MMOL/L
CO2 SERPL-SCNC: 27 MMOL/L
COLOR: YELLOW
CREAT SERPL-MCNC: 0.9 MG/DL
EGFRCR SERPLBLD CKD-EPI 2021: 95 ML/MIN/1.73M2
GLUCOSE QUALITATIVE U: NEGATIVE MG/DL
GLUCOSE SERPL-MCNC: 121 MG/DL
KETONES URINE: ABNORMAL MG/DL
LEUKOCYTE ESTERASE URINE: NEGATIVE
NITRITE URINE: NEGATIVE
PH URINE: 6
POTASSIUM SERPL-SCNC: 3.8 MMOL/L
PROT SERPL-MCNC: 7.9 G/DL
PROTEIN URINE: 100 MG/DL
SODIUM SERPL-SCNC: 139 MMOL/L
SPECIFIC GRAVITY URINE: 1.02
UROBILINOGEN URINE: 1 MG/DL

## 2025-06-17 ENCOUNTER — OUTPATIENT (OUTPATIENT)
Dept: OUTPATIENT SERVICES | Facility: HOSPITAL | Age: 65
LOS: 1 days | End: 2025-06-17
Payer: MEDICARE

## 2025-06-17 ENCOUNTER — LABORATORY RESULT (OUTPATIENT)
Age: 65
End: 2025-06-17

## 2025-06-17 ENCOUNTER — APPOINTMENT (OUTPATIENT)
Age: 65
End: 2025-06-17
Payer: MEDICARE

## 2025-06-17 VITALS
HEART RATE: 92 BPM | SYSTOLIC BLOOD PRESSURE: 145 MMHG | DIASTOLIC BLOOD PRESSURE: 84 MMHG | HEIGHT: 72 IN | WEIGHT: 226 LBS | RESPIRATION RATE: 16 BRPM | BODY MASS INDEX: 30.61 KG/M2 | TEMPERATURE: 97.9 F | OXYGEN SATURATION: 99 %

## 2025-06-17 DIAGNOSIS — C18.9 MALIGNANT NEOPLASM OF COLON, UNSPECIFIED: ICD-10-CM

## 2025-06-17 LAB
ALBUMIN SERPL ELPH-MCNC: 4.1 G/DL
ALP BLD-CCNC: 100 U/L
ALT SERPL-CCNC: 21 U/L
ANION GAP SERPL CALC-SCNC: 11 MMOL/L
APPEARANCE: CLEAR
APTT BLD: 30.6 SEC
AST SERPL-CCNC: 33 U/L
AUTO BASOPHILS #: 0.02 K/UL
AUTO BASOPHILS %: 0.6 %
AUTO EOSINOPHILS #: 0.02 K/UL
AUTO EOSINOPHILS %: 0.6 %
AUTO IMMATURE GRANULOCYTES #: 0.01 K/UL
AUTO LYMPHOCYTES #: 1.5 K/UL
AUTO LYMPHOCYTES %: 41.3 %
AUTO MONOCYTES #: 0.83 K/UL
AUTO MONOCYTES %: 22.9 %
AUTO NEUTROPHILS #: 1.25 K/UL
AUTO NEUTROPHILS %: 34.3 %
AUTO NRBC #: 0 K/UL
BILIRUB SERPL-MCNC: 0.3 MG/DL
BILIRUBIN URINE: NEGATIVE
BLOOD URINE: NEGATIVE
BUN SERPL-MCNC: 14 MG/DL
CALCIUM SERPL-MCNC: 9.3 MG/DL
CHLORIDE SERPL-SCNC: 102 MMOL/L
CO2 SERPL-SCNC: 25 MMOL/L
COLOR: NORMAL
CREAT SERPL-MCNC: 0.9 MG/DL
EGFRCR SERPLBLD CKD-EPI 2021: 95 ML/MIN/1.73M2
GLUCOSE QUALITATIVE U: NEGATIVE MG/DL
GLUCOSE SERPL-MCNC: 103 MG/DL
HCT VFR BLD CALC: 29.5 %
HGB BLD-MCNC: 10.3 G/DL
IMM GRANULOCYTES NFR BLD AUTO: 0.3 %
INR PPP: 1.07 RATIO
KETONES URINE: 15 MG/DL
LEUKOCYTE ESTERASE URINE: ABNORMAL
MAN DIFF?: NORMAL
MCHC RBC-ENTMCNC: 31.3 PG
MCHC RBC-ENTMCNC: 34.9 G/DL
MCV RBC AUTO: 89.7 FL
NITRITE URINE: NEGATIVE
PH URINE: 5.5
PLATELET # BLD AUTO: 263 K/UL
PMV BLD AUTO: 0 /100 WBCS
PMV BLD: 9.7 FL
POTASSIUM SERPL-SCNC: 4.1 MMOL/L
PROT SERPL-MCNC: 8.3 G/DL
PROTEIN URINE: 30 MG/DL
PT BLD: 12.7 SEC
RBC # BLD: 3.29 M/UL
RBC # FLD: 16.6 %
SODIUM SERPL-SCNC: 138 MMOL/L
SPECIFIC GRAVITY URINE: 1.02
UROBILINOGEN URINE: 1 MG/DL
WBC # FLD AUTO: 3.63 K/UL

## 2025-06-17 PROCEDURE — 80053 COMPREHEN METABOLIC PANEL: CPT

## 2025-06-17 PROCEDURE — 85730 THROMBOPLASTIN TIME PARTIAL: CPT

## 2025-06-17 PROCEDURE — 81001 URINALYSIS AUTO W/SCOPE: CPT

## 2025-06-17 PROCEDURE — 99214 OFFICE O/P EST MOD 30 MIN: CPT

## 2025-06-17 PROCEDURE — 85610 PROTHROMBIN TIME: CPT

## 2025-06-17 PROCEDURE — 82378 CARCINOEMBRYONIC ANTIGEN: CPT

## 2025-06-17 PROCEDURE — 85025 COMPLETE CBC W/AUTO DIFF WBC: CPT

## 2025-06-18 ENCOUNTER — TRANSCRIPTION ENCOUNTER (OUTPATIENT)
Age: 65
End: 2025-06-18

## 2025-06-18 ENCOUNTER — RESULT REVIEW (OUTPATIENT)
Age: 65
End: 2025-06-18

## 2025-06-18 ENCOUNTER — OUTPATIENT (OUTPATIENT)
Dept: OUTPATIENT SERVICES | Facility: HOSPITAL | Age: 65
LOS: 1 days | Discharge: HOME | End: 2025-06-18
Payer: MEDICARE

## 2025-06-18 VITALS
HEIGHT: 72 IN | WEIGHT: 227.08 LBS | SYSTOLIC BLOOD PRESSURE: 152 MMHG | RESPIRATION RATE: 18 BRPM | DIASTOLIC BLOOD PRESSURE: 85 MMHG | HEART RATE: 84 BPM | TEMPERATURE: 98 F

## 2025-06-18 DIAGNOSIS — C18.9 MALIGNANT NEOPLASM OF COLON, UNSPECIFIED: ICD-10-CM

## 2025-06-18 LAB
B PERT IGG+IGM PNL SER: CLEAR — SIGNIFICANT CHANGE UP
CEA SERPL-MCNC: 128 NG/ML
COLOR FLD: YELLOW — SIGNIFICANT CHANGE UP
FLUID INTAKE SUBSTANCE CLASS: SIGNIFICANT CHANGE UP
GLUCOSE BLDC GLUCOMTR-MCNC: 95 MG/DL — SIGNIFICANT CHANGE UP (ref 70–99)
LYMPHOCYTES # FLD: 25 — SIGNIFICANT CHANGE UP
MONOS+MACROS # FLD: 75 % — SIGNIFICANT CHANGE UP
NEUTROPHILS-BODY FLUID: 0 % — SIGNIFICANT CHANGE UP
RCV VOL RI: 0 /UL — SIGNIFICANT CHANGE UP (ref 0–0)
TOTAL NUCLEATED CELL COUNT, BODY FLUID: 172 /UL — SIGNIFICANT CHANGE UP
TUBE TYPE: SIGNIFICANT CHANGE UP

## 2025-06-18 PROCEDURE — 49083 ABD PARACENTESIS W/IMAGING: CPT

## 2025-06-18 PROCEDURE — C1729: CPT

## 2025-06-18 PROCEDURE — P9047: CPT | Mod: JZ

## 2025-06-18 PROCEDURE — 82962 GLUCOSE BLOOD TEST: CPT

## 2025-06-18 PROCEDURE — 88305 TISSUE EXAM BY PATHOLOGIST: CPT

## 2025-06-18 PROCEDURE — 88305 TISSUE EXAM BY PATHOLOGIST: CPT | Mod: 26

## 2025-06-18 PROCEDURE — 88112 CYTOPATH CELL ENHANCE TECH: CPT | Mod: 26

## 2025-06-18 PROCEDURE — 88342 IMHCHEM/IMCYTCHM 1ST ANTB: CPT

## 2025-06-18 PROCEDURE — 88341 IMHCHEM/IMCYTCHM EA ADD ANTB: CPT

## 2025-06-18 PROCEDURE — 89051 BODY FLUID CELL COUNT: CPT

## 2025-06-18 PROCEDURE — 88342 IMHCHEM/IMCYTCHM 1ST ANTB: CPT | Mod: 26

## 2025-06-18 PROCEDURE — 88341 IMHCHEM/IMCYTCHM EA ADD ANTB: CPT | Mod: 26

## 2025-06-18 PROCEDURE — 88112 CYTOPATH CELL ENHANCE TECH: CPT

## 2025-06-18 RX ORDER — ALBUMIN (HUMAN) 12.5 G/50ML
50 INJECTION, SOLUTION INTRAVENOUS ONCE
Refills: 0 | Status: COMPLETED | OUTPATIENT
Start: 2025-06-18 | End: 2025-06-18

## 2025-06-18 RX ORDER — ALBUMIN (HUMAN) 12.5 G/50ML
50 INJECTION, SOLUTION INTRAVENOUS ONCE
Refills: 0 | Status: DISCONTINUED | OUTPATIENT
Start: 2025-06-18 | End: 2025-06-18

## 2025-06-18 RX ADMIN — ALBUMIN (HUMAN) 50 MILLILITER(S): 12.5 INJECTION, SOLUTION INTRAVENOUS at 13:40

## 2025-06-18 NOTE — PROCEDURE NOTE - ADDITIONAL PROCEDURE DETAILS
Patient tolerated an ultrasound guide paracentesis.   Patient had 6 liters of serous straw colored ascites removed.   Patient had x1 25% albumin in 50 ml. Stable for discharge home.

## 2025-06-18 NOTE — ASU DISCHARGE PLAN (ADULT/PEDIATRIC) - FINANCIAL ASSISTANCE
[facility] provides services at a reduced cost to those who are determined to be eligible through [facility]’s financial assistance program. Information regarding [facility]’s financial assistance program can be found by going to [email or web address] or by calling [phone].

## 2025-06-18 NOTE — H&P ADULT - NSHPPHYSICALEXAM_GEN_ALL_CORE
Constitutional well-groomed; no distress; obese  Respiratory clear to auscultation bilaterally; no wheezes; no rales; no rhonchi  Cardiovascular regular rate and rhythm; S1 S2 present; no gallops; no rub; no murmur  Gastrointestinal distended; ascites  Musculoskeletal normal; normal gait; ROM intact; strength 5/5 bilateral upper extremities; strength 5/5 bilateral lower extremities  Psychiatric alert and oriented x3; anxious

## 2025-06-18 NOTE — H&P ADULT - HISTORY OF PRESENT ILLNESS
Patient is a 64 year old male PMH HTN, diabetes, HLD, and Malignant neoplasm of colon who presents to IR for therapeutic paracentesis

## 2025-06-24 DIAGNOSIS — C18.9 MALIGNANT NEOPLASM OF COLON, UNSPECIFIED: ICD-10-CM

## 2025-06-24 DIAGNOSIS — R18.8 OTHER ASCITES: ICD-10-CM

## 2025-07-01 ENCOUNTER — APPOINTMENT (OUTPATIENT)
Age: 65
End: 2025-07-01